# Patient Record
Sex: FEMALE | Race: WHITE | NOT HISPANIC OR LATINO | Employment: FULL TIME | URBAN - METROPOLITAN AREA
[De-identification: names, ages, dates, MRNs, and addresses within clinical notes are randomized per-mention and may not be internally consistent; named-entity substitution may affect disease eponyms.]

---

## 2017-03-06 ENCOUNTER — ALLSCRIPTS OFFICE VISIT (OUTPATIENT)
Dept: OTHER | Facility: OTHER | Age: 42
End: 2017-03-06

## 2017-03-29 ENCOUNTER — ALLSCRIPTS OFFICE VISIT (OUTPATIENT)
Dept: OTHER | Facility: OTHER | Age: 42
End: 2017-03-29

## 2017-09-05 LAB
LEFT EYE DIABETIC RETINOPATHY: NORMAL
RIGHT EYE DIABETIC RETINOPATHY: NORMAL

## 2017-09-26 ENCOUNTER — ALLSCRIPTS OFFICE VISIT (OUTPATIENT)
Dept: OTHER | Facility: OTHER | Age: 42
End: 2017-09-26

## 2017-09-29 ENCOUNTER — LAB CONVERSION - ENCOUNTER (OUTPATIENT)
Dept: OTHER | Facility: OTHER | Age: 42
End: 2017-09-29

## 2017-09-29 LAB
ADDITIONAL INFORMATION (HISTORICAL): NORMAL
COMMENT (HISTORICAL): NORMAL
DIAGNOSIS (HISTORICAL): NORMAL
PATHOLOGIST (HISTORICAL): NORMAL
PROCEDURE (HISTORICAL): NORMAL
SITE/SOURCE (HISTORICAL): NORMAL

## 2017-10-02 ENCOUNTER — GENERIC CONVERSION - ENCOUNTER (OUTPATIENT)
Dept: OTHER | Facility: OTHER | Age: 42
End: 2017-10-02
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
## 2018-01-09 NOTE — PSYCH
History of Present Illness  [facility] Progress [facility]:   Specialized Services Documentation - Therapist must complete separate progress note for each specific clinical activity in which the client participated during the day  ( ) Other 2751-2133 Clinical review completed today with Elizabeth Gonzalez [place] [facility] and three additional days were authorized PHP with review again on 6/1/16  Case Management Note:   Roger Hartmann met with CM to discuss her absence from Program yesterday  Pt states she did meet with her ex-fiance last evening and they discussed his terminal neurological disorder and his plan for treatment and end of life care  Pt stated that she knows that it is going to be very difficult for him, as well as for her, as he worsens but that she wants to be a support to him because she knows "he is frightened of dying alone " Neena stated that she was not sure where their relationship stood but he introduced her as his "fiance" to his home health RN yesterday and this was encouraging for her as she states she wants the relationship to continue  Pt states that she has rented an apt and will begin moving in this weekend  She stated that her ex- will move into the home and they both have already sat with their children and explained what is happening to them  Pt stated that she also took her children to the new apt to show them where she will live and discussed their reactions  Neena stated that she would like to speak with Dr Viktor Warren, when she returns, regarding medication for anxiety PRN as she feels very anxious  TREATMENT SESSION NUMBER: 4   Current suicide risk is low  Medications not changed/added/denied  Keegan Marcelino RN      Active Problems    1  ADHD, predominantly inattentive type (314 00) (F90 0)   2  Allergic rhinitis (477 9) (J30 9)   3  Anxiety and depression (300 00,311) (F41 9,F32 9)   4  Benign essential hypertension (401 1) (I10)   5   BMI 31 0-31 9,adult (V85 31) (Z68 31)   6  Cervicalgia (723 1) (M54 2)   7  Constipation (564 00) (K59 00)   8  Dysfunction of eustachian tube, unspecified laterality (381 81) (H69 80)   9  Encounter for screening mammogram for malignant neoplasm of breast (V76 12)   (Z12 31)   10  FORTINO (generalized anxiety disorder) (300 02) (F41 1)   11  Genital herpes simplex (054 10) (A60 00)   12  Headache (784 0) (R51)   13  Hip pain, unspecified laterality   14  Hypokalemia (276 8) (E87 6)   15  Lumbago (724 2) (M54 5)   16  Lyme disease (088 81) (A69 20)   17  Motion sickness (994 6) (T75 3XXA)   18  Obesity, Class I, BMI 30-34 9 (278 00) (E66 9)   19  Other muscle spasm (728 85) (M62 838)   20  Severe episode of recurrent major depressive disorder, without psychotic features    (296 33) (F33 2)   21  Tachycardia (785 0) (R00 0)   22  Thyroid disorder (246 9) (E07 9)   23  Well adult on routine health check (V70 0) (Z00 00)    Past Medical History    1  History of Acute bronchitis (466 0) (J20 9)   2  History of Acute pharyngitis (462) (J02 9)   3  History of Acute sinusitis (461 9) (J01 90)   4  History of Acute sinusitis (461 9) (J01 90)   5  History of Acute tonsillitis (463) (J03 90)   6  History of Acute upper respiratory infection (465 9) (J06 9)   7  History of Acute upper respiratory infection (465 9) (J06 9)   8  History of Acute UTI (599 0) (N39 0)   9  History of Cervicalgia (723 1) (M54 2)   10  History of acute pharyngitis (V12 69) (Z87 09)   11  History of migraine (V12 49) (Z86 69)   12  History of Nonallopathic lesion (739 9) (M99 9)   13  History of Palpitations (785 1) (R00 2)   14  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)   15  History of Shoulder joint pain, unspecified laterality   16  Urinary tract infection (599 0) (N39 0)    Allergies    1  Percocet TABS    Current Meds   1  Albuterol 90 MCG/ACT AERS; INHALE 1 TO 2 PUFFS EVERY 4 TO 6 HOURS AS   NEEDED; Therapy: (Recorded:09Jre5111) to Recorded   2  BuPROPion HCl ER (XL) 300 MG Oral Tablet Extended Release 24 Hour (Wellbutrin XL);   TAKE 1 TABLET Daily thity tablets; Therapy: 13ZPZ7876 to (Evaluate:23Jun2016)  Requested for: 82BPD9862; Last   Rx:57Stn6984 Ordered   3  Griselda 0 35 MG Oral Tablet; TAKE 1 TABLET DAILY; Therapy: 64LDY9662 to Recorded   4  Flonase 50 MCG/ACT Nasal Suspension (Fluticasone Propionate); USE 1 SPRAY IN   EACH NOSTRIL ONCE DAILY; Therapy: (Recorded:74Gha5090) to Recorded   5  HydrOXYzine HCl - 25 MG Oral Tablet; TAKE 1 TO 2 TABLETS AT BEDTIME; Last   Rx:17Mqv9660 Ordered   6  Metoprolol Succinate ER 50 MG Oral Tablet Extended Release 24 Hour; TAKE 1 TABLET   DAILY; Therapy: 55IHO1198 to (Evaluate:16Oct2016)  Requested for: 75LYA6733; Last   Rx:80Bwf5217 Ordered   7  Multivitamins TABS; 1 Every Day; Therapy: 08TPQ5106 to  Requested for: 79UWU0133 Recorded   8  Pristiq 100 MG Oral Tablet Extended Release 24 Hour; Take 1 tablet daily; Therapy: 32JNZ7274 to  Requested for: 03TTV6652 Recorded   9  Vyvanse 50 MG Oral Capsule; 1 capsule at 2:00pm;   Therapy: 08YZB8419 to  Requested for: 79VFF6433 Recorded   10  Vyvanse 60 MG Oral Capsule; take 1 capsule daily; Therapy: (Recorded:24Lxi5808) to Recorded   11  Zaleplon 10 MG Oral Capsule; TAKE 1 TO 2 CAPSULES AT BEDTIME AS NEEDED; Last    GF:28JPT8106 Ordered    Family Psych History  Son    1  Family history of attention deficit hyperactivity disorder (ADHD) (V17 0) (Z81 8)  Sister    2  Family history of Bipolar 1 disorder  Family History    3  Family history of Asbestosis   4  Family history of Cancer   5  Family history of Diabetes Mellitus (V18 0)   6  Family history of Emphysema   7  Family history of Glaucoma   8  Family history of Heart Disease (V17 49)   9  Family history of Hypertension (V17 49)   10   Family history of Mixed Hyperlipoproteinemia    Social History    · College graduate   ·    · Employed   · Never A Smoker   · No history of alcohol use    Future Appointments    Date/Time Provider Specialty Site   05/27/2016 10:00 AM GEORGETTE Bustos   Psychiatry [facility]     Signatures   Electronically signed by : Royce Saavedra RN; May 26 2016  4:20PM EST                       (Author)    Electronically signed by : Royce Saavedra RN; May 26 2016  4:23PM EST                       (Author)

## 2018-01-10 NOTE — PSYCH
History of Present Illness  [facility]:   Specialized Services Documentation - Therapist must complete separate progress note for each specific clinical activity in which the client participated during the day  Case Management Note: Individual Case Management visit not provided today  [address] excused due to IOP status  Rosaline Boyd, [facility]      Active Problems    1  ADHD, predominantly inattentive type (314 00) (F90 0)   2  Allergic rhinitis (477 9) (J30 9)   3  Anxiety and depression (300 00,311) (F41 9,F32 9)   4  Benign essential hypertension (401 1) (I10)   5  BMI 31 0-31 9,adult (V85 31) (Z68 31)   6  Cervicalgia (723 1) (M54 2)   7  Constipation (564 00) (K59 00)   8  Dysfunction of eustachian tube, unspecified laterality (381 81) (H69 80)   9  Encounter for screening mammogram for malignant neoplasm of breast (V76 12)   (Z12 31)   10  FORTINO (generalized anxiety disorder) (300 02) (F41 1)   11  Genital herpes simplex (054 10) (A60 00)   12  Headache (784 0) (R51)   13  Hip pain, unspecified laterality   14  Hypokalemia (276 8) (E87 6)   15  Lumbago (724 2) (M54 5)   16  Lyme disease (088 81) (A69 20)   17  Motion sickness (994 6) (T75 3XXA)   18  Obesity, Class I, BMI 30-34 9 (278 00) (E66 9)   19  Other muscle spasm (728 85) (M62 838)   20  Severe episode of recurrent major depressive disorder, without psychotic features    (296 33) (F33 2)   21  Tachycardia (785 0) (R00 0)   22  Thyroid disorder (246 9) (E07 9)   23  Well adult on routine health check (V70 0) (Z00 00)    Past Medical History    1  History of Acute bronchitis (466 0) (J20 9)   2  History of Acute pharyngitis (462) (J02 9)   3  History of Acute sinusitis (461 9) (J01 90)   4  History of Acute sinusitis (461 9) (J01 90)   5  History of Acute tonsillitis (463) (J03 90)   6  History of Acute upper respiratory infection (465 9) (J06 9)   7   History of Acute upper respiratory infection (465 9) (J06 9)   8  History of Acute UTI (599 0) (N39 0)   9  History of Cervicalgia (723 1) (M54 2)   10  Denied: History of Head trauma   11  History of acute pharyngitis (V12 69) (Z87 09)   12  History of migraine (V12 49) (Z86 69)   13  History of Nonallopathic lesion (739 9) (M99 9)   14  History of Palpitations (785 1) (R00 2)   15  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)   16  Denied: History of Seizure   17  History of Shoulder joint pain, unspecified laterality   18  Urinary tract infection (599 0) (N39 0)    Allergies    1  Percocet TABS    Current Meds   1  Albuterol 90 MCG/ACT AERS; INHALE 1 TO 2 PUFFS EVERY 4 TO 6 HOURS AS   NEEDED; Therapy: (Recorded:84Pam8348) to Recorded   2  BuPROPion HCl ER (XL) 300 MG Oral Tablet Extended Release 24 Hour (Wellbutrin XL);   TAKE 1 TABLET Daily thity tablets; Therapy: 21TGQ7107 to (Evaluate:23Jun2016)  Requested for: 01UJR4084; Last   Rx:68Eeg4883 Ordered   3  CloNIDine HCl - 0 1 MG Oral Tablet; Take one tablet PO  AT 6:00 PM and 1 tablet PO AT   9:00 pm;   Therapy: (Recorded:59Qle7486) to Recorded   4  Griselda 0 35 MG Oral Tablet; TAKE 1 TABLET DAILY; Therapy: 43BUR2442 to Recorded   5  Flonase 50 MCG/ACT SUSP (Fluticasone Propionate); USE 1 SPRAY IN EACH NOSTRIL   ONCE DAILY; Therapy: (Recorded:85Mzw9389) to Recorded   6  Gabapentin 300 MG Oral Capsule; TAKE 3 CAPSULE Bedtime; Last Rx:31Aoz7559   Ordered   7  HydrOXYzine HCl - 50 MG Oral Tablet; TAKE 2 TABLET Bedtime; Last Rx:89Rqm7170   Ordered   8  Metoprolol Succinate ER 50 MG Oral Tablet Extended Release 24 Hour; TAKE 1 TABLET   DAILY; Therapy: 74POS7822 to (Evaluate:16Oct2016)  Requested for: 05RKW4563; Last   Rx:70Bra5009 Ordered   9  Multivitamins TABS; 1 Every Day; Therapy: 22UMP1801 to  Requested for: 17KJC7610 Recorded   10  Pristiq 100 MG Oral Tablet Extended Release 24 Hour; Take 1 tablet daily; Therapy: 02DIM4446 to  Requested for: 66PPA6234 Recorded   11   Vyvanse 50 MG Oral Capsule; 1 capsule at 2:00pm;    Therapy: 42TOD0962 to  Requested for: 34LEX6995 Recorded   12  Vyvanse 60 MG Oral Capsule; take 1 capsule daily; Therapy: (Recorded:54Nde2337) to Recorded   13  Zaleplon 10 MG Oral Capsule; TAKE 1 TO 2 CAPSULES AT BEDTIME AS NEEDED; Last    Rx:96Szk6983 Ordered    Family Psych History  Son    1  Family history of attention deficit hyperactivity disorder (ADHD) (V17 0) (Z81 8)  Sister    2  Family history of Bipolar 1 disorder  Family History    3  Family history of Asbestosis   4  Family history of Cancer   5  Family history of Diabetes Mellitus (V18 0)   6  Family history of Emphysema   7  Family history of Glaucoma   8  Family history of Heart Disease (V17 49)   9  Family history of Hypertension (V17 49)   10   Family history of Mixed Hyperlipoproteinemia    Social History    · College graduate   ·    · Employed   · Never A Smoker   · No history of alcohol use    Future Appointments    Date/Time Provider Specialty Site   09/23/2016 10:15 AM Jude Harding DO Psychiatry [facility]     Signatures   Electronically signed by : FAYE Pham; Sep 22 2016  7:48AM EST                       (Author)

## 2018-01-10 NOTE — PSYCH
History of Present Illness  [facility] Clinical Progress Note [facility]:   Specialized Services Documentation - Therapist must complete separate progress note for each specific clinical activity in which the client participated during the day  Case Management Note:   1300 Carly [facility] did not call and did not show for [facility] today  D/C was planned for today  Patient was called by this CM and message was left on her cell phone requesting return call  1200 Patient was called again and Voice mail message left again by CM requesting return call  1535 CM called patient again and she did  phone and stated she had just exited the shower and was "trying to get going " Patient apologized for not calling out absent today stating she had a "bad night last evening " Neena denied suicidal or homicidal ideation, plan or intent and stated that she was able to "download guided meditation last evening" and this helped her to sleep, and now she is trying to utilize some of her positive coping strategies such as she is going to go out for a walk, in a little while, to the post office and back  Patient states plans for the weekend are to attend her daughter, [employer] [employer] [facility] dance recital both Sat  and Sun  and perhaps see her fianceJuan M  CM reviewed with patient crisis information that she states she has and patient states she will contact crisis if needed and contracted for safety  Jenny Cordoval will come to [facility] on Monday to complete D/C  Current suicide risk is low  Medications not changed/added/denied  Roland Waters, RN   [facility] Follow-up Physician's Orders:   6/24/2016  8:52 AM Discharge Today   MD Roland Burns, RN      Active Problems    1  ADHD, predominantly inattentive type (314 00) (F90 0)   2  Allergic rhinitis (477 9) (J30 9)   3  Anxiety and depression (300 00,311) (F41 9,F32 9)   4  Benign essential hypertension (401 1) (I10)   5  BMI 31 0-31 9,adult (V85 31) (Z68 31)   6  Cervicalgia (723 1) (M54 2)   7  Constipation (564 00) (K59 00)   8  Dysfunction of eustachian tube, unspecified laterality (381 81) (H69 80)   9  Encounter for screening mammogram for malignant neoplasm of breast (V76 12)   (Z12 31)   10  FORTINO (generalized anxiety disorder) (300 02) (F41 1)   11  Genital herpes simplex (054 10) (A60 00)   12  Headache (784 0) (R51)   13  Hip pain, unspecified laterality   14  Hypokalemia (276 8) (E87 6)   15  Lumbago (724 2) (M54 5)   16  Lyme disease (088 81) (A69 20)   17  Motion sickness (994 6) (T75 3XXA)   18  Obesity, Class I, BMI 30-34 9 (278 00) (E66 9)   19  Other muscle spasm (728 85) (M62 838)   20  Severe episode of recurrent major depressive disorder, without psychotic features    (296 33) (F33 2)   21  Tachycardia (785 0) (R00 0)   22  Thyroid disorder (246 9) (E07 9)   23  Well adult on routine health check (V70 0) (Z00 00)    Past Medical History    1  History of Acute bronchitis (466 0) (J20 9)   2  History of Acute pharyngitis (462) (J02 9)   3  History of Acute sinusitis (461 9) (J01 90)   4  History of Acute sinusitis (461 9) (J01 90)   5  History of Acute tonsillitis (463) (J03 90)   6  History of Acute upper respiratory infection (465 9) (J06 9)   7  History of Acute upper respiratory infection (465 9) (J06 9)   8  History of Acute UTI (599 0) (N39 0)   9  History of Cervicalgia (723 1) (M54 2)   10  History of acute pharyngitis (V12 69) (Z87 09)   11  History of migraine (V12 49) (Z86 69)   12  History of Nonallopathic lesion (739 9) (M99 9)   13  History of Palpitations (785 1) (R00 2)   14  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)   15  History of Shoulder joint pain, unspecified laterality   16  Urinary tract infection (599 0) (N39 0)    Allergies    1  Percocet TABS    Current Meds   1  Albuterol 90 MCG/ACT AERS; INHALE 1 TO 2 PUFFS EVERY 4 TO 6 HOURS AS   NEEDED; Therapy: (Recorded:58Dwl4947) to Recorded   2   BuPROPion HCl ER (XL) 300 MG Oral Tablet Extended Release 24 Hour (Wellbutrin XL);   TAKE 1 TABLET Daily thity tablets; Therapy: 61OTJ1758 to (Evaluate:23Jun2016)  Requested for: 34VQM3378; Last   Rx:77Vgh4100 Ordered   3  ClonazePAM 1 MG Oral Tablet; TAKE 1 TABLET AT BEDTIME; Therapy: 47QEV2463 to (Evaluate:38Jps6557); Last Rx:10Czl8234 Ordered   4  Griselda 0 35 MG Oral Tablet; TAKE 1 TABLET DAILY; Therapy: 02JRE3740 to Recorded   5  Flonase 50 MCG/ACT SUSP (Fluticasone Propionate); USE 1 SPRAY IN EACH NOSTRIL   ONCE DAILY; Therapy: (Recorded:91Tlw7455) to Recorded   6  HydrOXYzine HCl - 25 MG Oral Tablet; TAKE 1 TO 2 TABLETS AT BEDTIME; Last   Rx:17Qsz3180 Ordered   7  Metoprolol Succinate ER 50 MG Oral Tablet Extended Release 24 Hour; TAKE 1 TABLET   DAILY; Therapy: 76EWT5653 to (Evaluate:16Oct2016)  Requested for: 27XXB6170; Last   Rx:66Qrp9587 Ordered   8  Multivitamins TABS; 1 Every Day; Therapy: 36KWN4733 to  Requested for: 09BRB1160 Recorded   9  Pristiq 100 MG Oral Tablet Extended Release 24 Hour; Take 1 tablet daily; Therapy: 68VLP8470 to  Requested for: 02PFS1060 Recorded   10  Vyvanse 50 MG Oral Capsule; 1 capsule at 2:00pm;    Therapy: 21SUN8790 to  Requested for: 76EOO5315 Recorded   11  Vyvanse 60 MG Oral Capsule; take 1 capsule daily; Therapy: (Recorded:64Ric3810) to Recorded   12  Zaleplon 10 MG Oral Capsule; TAKE 1 TO 2 CAPSULES AT BEDTIME AS NEEDED; Last    Rx:49Ped3924 Ordered    Family Psych History  Son    1  Family history of attention deficit hyperactivity disorder (ADHD) (V17 0) (Z81 8)  Sister    2  Family history of Bipolar 1 disorder  Family History    3  Family history of Asbestosis   4  Family history of Cancer   5  Family history of Diabetes Mellitus (V18 0)   6  Family history of Emphysema   7  Family history of Glaucoma   8  Family history of Heart Disease (V17 49)   9  Family history of Hypertension (V17 49)   10   Family history of Mixed Hyperlipoproteinemia    Social History    · College graduate   ·    · Employed   · Never A Smoker   · No history of alcohol use    Future Appointments    Date/Time Provider Specialty Site   06/24/2016 10:15 AM GEORGETTE Parker   Psychiatry [facility]   07/14/2016 11:00 AM Paulette Hankins [place]     Signatures   Electronically signed by : Domo Herman RN; Jun 24 2016  8:08AM EST                       (Author)    Electronically signed by : GEORGETTE Sal ; Jun 24 2016  8:51AM EST                       (Author)    Electronically signed by : Domo Herman RN; Jun 24 2016  9:56AM EST                       (Author)    Electronically signed by : Domo Herman RN; Jun 24 2016 11:01AM EST                       (Author)    Electronically signed by : Domo Herman RN; Jun 24 2016  3:46PM EST                       (Author)

## 2018-01-10 NOTE — PSYCH
History of Present Illness  [facility] Clinical Progress [facility]:   Specialized Services Documentation - Therapist must complete separate progress note for each specific clinical activity in which the client participated during the day  (810) Group Psychotherapy: (9:30-10:30) Neena participated in psychotherapy group focused on triggers and processing emotions  Neena identified work as a stressor  Miky Huynh was actively engaged in conversation about the importance of exploring difficult feelings and how avoiding them impacts overall health and well being  She shared with peers ways that she has begun to work on facing her emotions and shared some resources that she has found helpful and inspirational  Some moderate progress toward goals  Continue psychotherapy group to encourage Neena to explore stressors and coping  Treatment Plan Problem(s): 1 1, 1 2  Becki Stephenson [facility]     (324) Group Psychotherapy: ([id number])  Miky Huynh was an active participant in the group on gratitude  She shared personal accounts with her peers, and offered sound advice when warranted  Neena made moderate progress towards her goal    Nay Swain  [facility]  Treatment Plan Problem(s): 1 3        (592) Education Therapy Goals set - continue to work on [address] Problem(s): 1 2  Education Therapy Time - 0900 - 0930 Previous goal was met  Readiness to Learning:  She is receptive to learning  There are  no barriers to learning  Learning Assessment Time - 1330 - 1400   Education completed on  illness and wellness tools  The teaching method was  verbal  Shared area of learning: Yes  FAYE Krueger     (952) Allied Therapy 3524-3219 Neena actively shared in [facility] group focused on perception  Group explored development of thoughts based on perception and ways to make choices related to view we take of situations and self  She identified influences to her perception and a new understanding of changing her thoughts  She was open and able to apply concepts to herself, however she appeared distracted and off task at times (focusing on small elements of task versus bigger picture)  Group discussed strategies to explore ways to reframe perception to helpful versus hurtful using reality testing strategies  Some inconsistent effort toward goal noted  Continue AT to explore wellness strategies and personal role in reframing thoughts  Treatment Plan Problem(s): 1 5, 1 6  ROGERS DennisBC       Case Management Note:   1999-1250 Met with [address]  She identified spending time with s/o over weekend  She feels her relationship is not an issue, but still struggles with moving it "too fast" and her emotional connection  Encouraged her to focus on treatment goals this week from 1 5 and 1 6 - to spend time exploring what would need to happen to return to work and be successful and to continue to maintain self care - basic ADLs to contribute to building self-confidence  She will be excused tomorrow due to IOP status  TREATMENT SESSION NUMBER: 11   Current suicide risk is low  Medications not changed/added/denied  ROGERS DennisBC      Active Problems    1  ADHD, predominantly inattentive type (314 00) (F90 0)   2  Allergic rhinitis (477 9) (J30 9)   3  Anxiety and depression (300 00,311) (F41 9,F32 9)   4  Benign essential hypertension (401 1) (I10)   5  BMI 31 0-31 9,adult (V85 31) (Z68 31)   6  Cervicalgia (723 1) (M54 2)   7  Constipation (564 00) (K59 00)   8  Dysfunction of eustachian tube, unspecified laterality (381 81) (H69 80)   9  Encounter for screening mammogram for malignant neoplasm of breast (V76 12)   (Z12 31)   10  FORTINO (generalized anxiety disorder) (300 02) (F41 1)   11  Genital herpes simplex (054 10) (A60 00)   12  Headache (784 0) (R51)   13  Hip pain, unspecified laterality   14  Hypokalemia (276 8) (E87 6)   15  Lumbago (724 2) (M54 5)   16  Lyme disease (088 81) (A69 20)   17   Motion sickness (994 6) (T75 3XXA)   18  Obesity, Class I, BMI 30-34 9 (278 00) (E66 9)   19  Other muscle spasm (728 85) (M62 838)   20  Severe episode of recurrent major depressive disorder, without psychotic features    (296 33) (F33 2)   21  Tachycardia (785 0) (R00 0)   22  Thyroid disorder (246 9) (E07 9)   23  Well adult on routine health check (V70 0) (Z00 00)    Past Medical History    1  History of Acute bronchitis (466 0) (J20 9)   2  History of Acute pharyngitis (462) (J02 9)   3  History of Acute sinusitis (461 9) (J01 90)   4  History of Acute sinusitis (461 9) (J01 90)   5  History of Acute tonsillitis (463) (J03 90)   6  History of Acute upper respiratory infection (465 9) (J06 9)   7  History of Acute upper respiratory infection (465 9) (J06 9)   8  History of Acute UTI (599 0) (N39 0)   9  History of Cervicalgia (723 1) (M54 2)   10  Denied: History of Head trauma   11  History of acute pharyngitis (V12 69) (Z87 09)   12  History of migraine (V12 49) (Z86 69)   13  History of Nonallopathic lesion (739 9) (M99 9)   14  History of Palpitations (785 1) (R00 2)   15  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)   16  Denied: History of Seizure   17  History of Shoulder joint pain, unspecified laterality   18  Urinary tract infection (599 0) (N39 0)    Allergies    1  Percocet TABS    Current Meds   1  Albuterol 90 MCG/ACT AERS; INHALE 1 TO 2 PUFFS EVERY 4 TO 6 HOURS AS   NEEDED; Therapy: (Recorded:40Zbi7930) to Recorded   2  BuPROPion HCl ER (XL) 300 MG Oral Tablet Extended Release 24 Hour; TAKE 1   TABLET Daily thity tablets; Therapy: 26ZMG6034 to (Evaluate:23Jun2016)  Requested for: 35WFQ7515; Last   Rx:06Dkt8352 Ordered   3  CloNIDine HCl - 0 1 MG Oral Tablet; Take one tablet PO  AT 6:00 PM and 1 tablet PO AT   9:00 pm;   Therapy: (Recorded:26Lim4231) to Recorded   4  Griselda 0 35 MG Oral Tablet; TAKE 1 TABLET DAILY; Therapy: 10DKR3754 to Recorded   5   Flonase 50 MCG/ACT SUSP; USE 1 SPRAY IN EACH NOSTRIL ONCE DAILY; Therapy: (Recorded:80Pxn5855) to Recorded   6  Gabapentin 300 MG Oral Capsule; TAKE 3 CAPSULE Bedtime; Last Rx:19Qpf3224   Ordered   7  HydrOXYzine HCl - 50 MG Oral Tablet; TAKE 2 TABLET Bedtime; Last Rx:64Edl6020   Ordered   8  Metoprolol Succinate ER 50 MG Oral Tablet Extended Release 24 Hour; TAKE 1 TABLET   DAILY; Therapy: 01VLK9143 to (Evaluate:91Voa0366)  Requested for: 83KCG9955; Last   Rx:67Aew6957 Ordered   9  Multivitamins TABS; 1 Every Day; Therapy: 66SKA3092 to  Requested for: 26MKW4114 Recorded   10  Pristiq 100 MG Oral Tablet Extended Release 24 Hour; Take 1 tablet daily; Therapy: 70HGY9618 to  Requested for: 08IVT0198 Recorded   11  Vyvanse 50 MG Oral Capsule; 1 capsule at 2:00pm;    Therapy: 95LYW8040 to  Requested for: 66MMK0267 Recorded   12  Vyvanse 60 MG Oral Capsule; take 1 capsule daily; Therapy: (Recorded:46Bop8976) to Recorded   13  Zaleplon 10 MG Oral Capsule; TAKE 1 TO 2 CAPSULES AT BEDTIME AS NEEDED; Last    A93TCM7403 Ordered    Family Psych History  Son    1  Family history of attention deficit hyperactivity disorder (ADHD) (V17 0) (Z81 8)  Sister    2  Family history of Bipolar 1 disorder  Family History    3  Family history of Asbestosis   4  Family history of Cancer   5  Family history of Diabetes Mellitus (V18 0)   6  Family history of Emphysema   7  Family history of Glaucoma   8  Family history of Heart Disease (V17 49)   9  Family history of Hypertension (V17 49)   10  Family history of Mixed Hyperlipoproteinemia    Social History    · College graduate   ·    · Employed   · Never A Smoker   · No history of alcohol use    Future Appointments    Date/Time Provider Specialty Site   2016 10:15 AM GEORGETTE Curtis   Psychiatry [facility]   2016 10:15 AM Scott Caldera DO Psychiatry [facility]   2016 11:00 AM Paulette Hankins, [address]     Signatures   Electronically signed by : HARSHAL SteenLSW; Sep 19 2016 12:06PM EST                       (Author)    Electronically signed by : BYRON HollidayW; Sep 19 2016 12:36PM EST                       (Author)    Electronically signed by : FAYE Núñez; Sep 19 2016  2:36PM EST                       (Author)

## 2018-01-10 NOTE — PSYCH
History of Present Illness  [facility] Clinical Progress Note [facility]:   Specialized Services Documentation - Therapist must complete separate progress note for each specific clinical activity in which the client participated during the day  [phone]) Group Psychotherapy: 9:30-10:30-Neena participated in psychotherapy group dealing with themes of healthy relationships and dealing with stigma and guilt of mental illness  Neena identified her boyfriend and their relationship as a stressor  She was engaged in group discussion about impulsive behaviors and offered feedback to her peers  Minimal progress noted toward goals  Continue group psychotherapy to allow patient to explore stressors and healthier ways of coping  Treatment Plan Problem(s): 1 1, 1 2  Yumiko Boothe MSW, LSW     (108) Group Psychotherapy: 4084-3101 [facility] participated in wellness group focused on assessment of weekly goal work in each area of functioning; including physical, emotional, cognitive, social and spiritual  Pt shared that she will continue to work on emotional area as she is feeling she needs to work on coping strategies to handle her loneliness more appropriately now that she is living alone  Pt shared she became emotionally upset when she spent time with her fiance on Fathers Day and his oldest daughter asked that she leave so patient went out and bought a new TV on the way home "thanks to her " Pt made mild progress was made toward goal  Continue group to educate and encourage patient to identify areas of functioning requiring ongoing attention for healthier functioning  Treatment Plan Problem(s): 1 1, 1 2, 1 4  Floyd Oropeza RN       (388) Education Therapy Goals set - review schedule to keep self organized    Treatment Plan Problem(s): 1 5  Education Therapy Time - 0900 - 0930 Previous goal was met  Readiness to Learning:  She is receptive to learning  There are  no barriers to learning    Learning Assessment Time - 5419 - 3332 Education completed on  illness and wellness tools  The teaching method was  verbal  Shared area of learning: Yes  FAYE Lyons     (985) Allied Therapy 2377-4443 Neena actively shared in [facility] group focused on challenging anxiety and fear  Patient participated in discussion related to song exploring positive risk  She identified fear of the unknown being a struggle for her  She engaged in several tasks to explore distraction as well as role of active steps toward change  Patient positively contributed to the discussion, sharing about self and actively attempting to engage with peers  Positive efforts toward tx goals  Continue AT to explore active role in skill development and use to reduce symptoms  Treatment Plan Problem(s): 1 1  FAYE Lyons       Case Management Note:   14//IOP3 6756-2203 Neena met with CM to discuss that she had difficulty this weekend emotionally, as neither her ex- nor her fiance, were supportive toward her when she visited with each of them  She stated that she has begun to realize that she does need to add structure to her days and has begun working on a routine or schedule for when she is discharged from [facility]  Patient denied suicidal or homicidal ideation, plan or intent  Patient has been working on [employer] and will bring to Program on Wednesday to review with CM as well as provisional schedule  Patient's FMLA forms have been completed, signed by [facility] psychiatrist and given to patient to deliver, per pt request    TREATMENT SESSION NUMBER: 14   Current suicide risk is low  Medications not changed/added/denied  Tien Huitron, RN      Active Problems    1  ADHD, predominantly inattentive type (314 00) (F90 0)   2  Allergic rhinitis (477 9) (J30 9)   3  Anxiety and depression (300 00,311) (F41 9,F32 9)   4  Benign essential hypertension (401 1) (I10)   5  BMI 31 0-31 9,adult (V85 31) (Z68 31)   6  Cervicalgia (723 1) (M54 2)   7   Constipation (564 00) (K59 00)   8  Dysfunction of eustachian tube, unspecified laterality (381 81) (H69 80)   9  Encounter for screening mammogram for malignant neoplasm of breast (V76 12)   (Z12 31)   10  FORTINO (generalized anxiety disorder) (300 02) (F41 1)   11  Genital herpes simplex (054 10) (A60 00)   12  Headache (784 0) (R51)   13  Hip pain, unspecified laterality   14  Hypokalemia (276 8) (E87 6)   15  Lumbago (724 2) (M54 5)   16  Lyme disease (088 81) (A69 20)   17  Motion sickness (994 6) (T75 3XXA)   18  Obesity, Class I, BMI 30-34 9 (278 00) (E66 9)   19  Other muscle spasm (728 85) (M62 838)   20  Severe episode of recurrent major depressive disorder, without psychotic features    (296 33) (F33 2)   21  Tachycardia (785 0) (R00 0)   22  Thyroid disorder (246 9) (E07 9)   23  Well adult on routine health check (V70 0) (Z00 00)    Past Medical History    1  History of Acute bronchitis (466 0) (J20 9)   2  History of Acute pharyngitis (462) (J02 9)   3  History of Acute sinusitis (461 9) (J01 90)   4  History of Acute sinusitis (461 9) (J01 90)   5  History of Acute tonsillitis (463) (J03 90)   6  History of Acute upper respiratory infection (465 9) (J06 9)   7  History of Acute upper respiratory infection (465 9) (J06 9)   8  History of Acute UTI (599 0) (N39 0)   9  History of Cervicalgia (723 1) (M54 2)   10  History of acute pharyngitis (V12 69) (Z87 09)   11  History of migraine (V12 49) (Z86 69)   12  History of Nonallopathic lesion (739 9) (M99 9)   13  History of Palpitations (785 1) (R00 2)   14  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)   15  History of Shoulder joint pain, unspecified laterality   16  Urinary tract infection (599 0) (N39 0)    Allergies    1  Percocet TABS    Current Meds   1  Albuterol 90 MCG/ACT AERS; INHALE 1 TO 2 PUFFS EVERY 4 TO 6 HOURS AS   NEEDED; Therapy: (Recorded:79Jjt3916) to Recorded   2   BuPROPion HCl ER (XL) 300 MG Oral Tablet Extended Release 24 Hour; TAKE 1   TABLET Daily dhaval tablets; Therapy: 10QTZ7951 to (Evaluate:23Jun2016)  Requested for: 96IIA8233; Last   Rx:24May2016 Ordered   3  ClonazePAM 1 MG Oral Tablet; TAKE 1 TABLET AT BEDTIME; Therapy: 12HWE6329 to (Evaluate:21Vzh9693); Last Rx:10Jun2016 Ordered   4  Griselda 0 35 MG Oral Tablet; TAKE 1 TABLET DAILY; Therapy: 47LIL3008 to Recorded   5  Flonase 50 MCG/ACT SUSP; USE 1 SPRAY IN EACH NOSTRIL ONCE DAILY; Therapy: (Recorded:20May2016) to Recorded   6  HydrOXYzine HCl - 25 MG Oral Tablet; TAKE 1 TO 2 TABLETS AT BEDTIME; Last   Rx:24May2016 Ordered   7  Metoprolol Succinate ER 50 MG Oral Tablet Extended Release 24 Hour; TAKE 1 TABLET   DAILY; Therapy: 83MCU6961 to (Evaluate:16Oct2016)  Requested for: 19FVS9086; Last   Rx:19Apr2016 Ordered   8  Multivitamins TABS; 1 Every Day; Therapy: 09JXX8662 to  Requested for: 17RIK9661 Recorded   9  Pristiq 100 MG Oral Tablet Extended Release 24 Hour; Take 1 tablet daily; Therapy: 35CHN1305 to  Requested for: 31IDE9613 Recorded   10  Vyvanse 50 MG Oral Capsule; 1 capsule at 2:00pm;    Therapy: 12PTZ6013 to  Requested for: 96UCW5176 Recorded   11  Vyvanse 60 MG Oral Capsule; take 1 capsule daily; Therapy: (Recorded:24May2016) to Recorded   12  Zaleplon 10 MG Oral Capsule; TAKE 1 TO 2 CAPSULES AT BEDTIME AS NEEDED; Last    Rx:24May2016 Ordered    Family Psych History  Son    1  Family history of attention deficit hyperactivity disorder (ADHD) (V17 0) (Z81 8)  Sister    2  Family history of Bipolar 1 disorder  Family History    3  Family history of Asbestosis   4  Family history of Cancer   5  Family history of Diabetes Mellitus (V18 0)   6  Family history of Emphysema   7  Family history of Glaucoma   8  Family history of Heart Disease (V17 49)   9  Family history of Hypertension (V17 49)   10   Family history of Mixed Hyperlipoproteinemia    Social History    · College graduate   ·    · Employed   · Never A Smoker   · No history of alcohol use    Future Appointments    Date/Time Provider Specialty Site   06/22/2016 10:15 AM GEORGETTE Merrill  Psychiatry [facility]   06/24/2016 10:15 AM GEORGETTE Merrill   Psychiatry [facility]   07/14/2016 11:00 AM [facility]     Signatures   Electronically signed by : Madonna Nageotte, MSWLSW; Jun 20 2016 11:28AM EST                       (Author)    Electronically signed by : Jose Lawton RN; Jun 20 2016  1:57PM EST                       (Author)    Electronically signed by : FAYE Barger; Jun 20 2016  2:40PM EST                       (Author)    Electronically signed by : Jose Lawton RN; Jun 22 2016  3:37PM EST                       (Author)

## 2018-01-11 NOTE — PSYCH
History of Present Illness  [facility]:   Specialized Services Documentation - Therapist must complete separate progress note for each specific clinical activity in which the client participated during the day  [phone]) Group Psychotherapy: (9:30-10:30) Neena attended psychotherapy group dealing with themes of daily structure outside of program and managing stress  Neena identified moving out of her house and not getting support from her ex- as a stressor  Neena also discussed going shopping over the weekend and that she was able to resist buying an expensive recliner for her apartment, which she felt good about  She was actively engaged in the discussion and frequently offered peers constructive feedback  Good progress noted toward goals  Continue group psychotherapy to allow patient to explore stressors and healthy ways of coping  Treatment Plan Problem(s): 1 1, 1 2  Garima Stubbs MSW, LSW     (029) Group Psychotherapy: [phone] Aislinn Bell participated in wellness group focused on educating patient on benefits of beginning to exercise to improve mood and physical well-being with walking on a regular basis  Pt shared that she has many reasons why she avoids exercising regularly but has recently realized how "out of shape I am when moving I really felt it " Pt states she wants to exercise to be in better shape for herself and for her children so she can do things with them interactively rather than sit on the sidelines  Pt made mild progress toward goal  Continue group to educate patient on benefits of getting started in a walking program and assessing common road blocks that prevent people from maintaining regular exercise  Treatment Plan Problem(s): 1 1,1 2  Luis Carlos Jaime RN       (319) Education Therapy Goals set - to begin to organize/unpack boxes    Treatment Plan Problem(s): 1 5  Education Therapy Time - 0900 - 0930 Previous goal was met  Readiness to Learning:   She is receptive to learning  There are  no barriers to learning  Learning Assessment Time - 1330 - 1400   Education completed on  illness and wellness tools  The teaching method was  verbal  Shared area of learning: Yes  FAYE Lyons     (674) Allied Therapy [phone] Neena actively shared in [facility] group focused on barriers to communication  Engaged in tasks exploring what makes it difficult to share and strategies to improve with supports  She shared a need to work on clarifying her position before she talks and difficulty trusting others  Group reinforced personal role in sharing needs and âIâ statements practiced  Some exploration of goal noted  Continue AT to encourage development and use of healthy wellness tools  Treatment Plan Problem(s): 1 5  FAYE Lyons       Case Management Note:   13/IOP3 [phone] Neena met with CM to discuss progress in Program  She stated that she reviewed treatment plan with CM reserve on Friday and, over the weekend she has written down some additional goals she would like to work on going forward  Pt states she is working on her WRAP and is up to [facility]  Pt requested assistance with WRAP completion on Wednesday when she is again at Program  Pt denied any suicidal or homicidal thoughts, plan or intent  TREATMENT SESSION NUMBER: 13   Current suicide risk is low  Tien Huitron RN      Active Problems    1  ADHD, predominantly inattentive type (314 00) (F90 0)   2  Allergic rhinitis (477 9) (J30 9)   3  Anxiety and depression (300 00,311) (F41 9,F32 9)   4  Benign essential hypertension (401 1) (I10)   5  BMI 31 0-31 9,adult (V85 31) (Z68 31)   6  Cervicalgia (723 1) (M54 2)   7  Constipation (564 00) (K59 00)   8  Dysfunction of eustachian tube, unspecified laterality (381 81) (H69 80)   9  Encounter for screening mammogram for malignant neoplasm of breast (V76 12)   (Z12 31)   10  FORTINO (generalized anxiety disorder) (300 02) (F41 1)   11   Genital herpes simplex (054 10) (A60 00)   12  Headache (784 0) (R51)   13  Hip pain, unspecified laterality   14  Hypokalemia (276 8) (E87 6)   15  Lumbago (724 2) (M54 5)   16  Lyme disease (088 81) (A69 20)   17  Motion sickness (994 6) (T75 3XXA)   18  Obesity, Class I, BMI 30-34 9 (278 00) (E66 9)   19  Other muscle spasm (728 85) (M62 838)   20  Severe episode of recurrent major depressive disorder, without psychotic features    (296 33) (F33 2)   21  Tachycardia (785 0) (R00 0)   22  Thyroid disorder (246 9) (E07 9)   23  Well adult on routine health check (V70 0) (Z00 00)    Past Medical History    1  History of Acute bronchitis (466 0) (J20 9)   2  History of Acute pharyngitis (462) (J02 9)   3  History of Acute sinusitis (461 9) (J01 90)   4  History of Acute sinusitis (461 9) (J01 90)   5  History of Acute tonsillitis (463) (J03 90)   6  History of Acute upper respiratory infection (465 9) (J06 9)   7  History of Acute upper respiratory infection (465 9) (J06 9)   8  History of Acute UTI (599 0) (N39 0)   9  History of Cervicalgia (723 1) (M54 2)   10  History of acute pharyngitis (V12 69) (Z87 09)   11  History of migraine (V12 49) (Z86 69)   12  History of Nonallopathic lesion (739 9) (M99 9)   13  History of Palpitations (785 1) (R00 2)   14  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)   15  History of Shoulder joint pain, unspecified laterality   16  Urinary tract infection (599 0) (N39 0)    Allergies    1  Percocet TABS    Current Meds   1  Albuterol 90 MCG/ACT AERS; INHALE 1 TO 2 PUFFS EVERY 4 TO 6 HOURS AS   NEEDED; Therapy: (Recorded:61Qwa3809) to Recorded   2  BuPROPion HCl ER (XL) 300 MG Oral Tablet Extended Release 24 Hour; TAKE 1   TABLET Daily thity tablets; Therapy: 75CYV9518 to (Evaluate:23Jun2016)  Requested for: 71CFG3588; Last   Rx:24May2016 Ordered   3  ClonazePAM 1 MG Oral Tablet; TAKE 1 TABLET AT BEDTIME; Therapy: 61DVI1575 to (Evaluate:10Jul2016); Last Rx:10Jun2016 Ordered   4   Griselda 0 35 MG Oral Tablet; TAKE 1 TABLET DAILY; Therapy: 02DSB4644 to Recorded   5  Flonase 50 MCG/ACT SUSP; USE 1 SPRAY IN EACH NOSTRIL ONCE DAILY; Therapy: (Recorded:19Ecc6698) to Recorded   6  HydrOXYzine HCl - 25 MG Oral Tablet; TAKE 1 TO 2 TABLETS AT BEDTIME; Last   Rx:86Yfa4997 Ordered   7  Metoprolol Succinate ER 50 MG Oral Tablet Extended Release 24 Hour; TAKE 1 TABLET   DAILY; Therapy: 24YJW1187 to (Evaluate:16Oct2016)  Requested for: 85SZS7285; Last   Rx:52Udq7245 Ordered   8  Multivitamins TABS; 1 Every Day; Therapy: 63CQQ6147 to  Requested for: 56QVP2827 Recorded   9  Pristiq 100 MG Oral Tablet Extended Release 24 Hour; Take 1 tablet daily; Therapy: 66SCR0630 to  Requested for: 17SQU9219 Recorded   10  Vyvanse 50 MG Oral Capsule; 1 capsule at 2:00pm;    Therapy: 88NEB7855 to  Requested for: 11LPV5158 Recorded   11  Vyvanse 60 MG Oral Capsule; take 1 capsule daily; Therapy: (Recorded:44Cmh1076) to Recorded   12  Zaleplon 10 MG Oral Capsule; TAKE 1 TO 2 CAPSULES AT BEDTIME AS NEEDED; Last    Rx:97Avf6808 Ordered    Family Psych History  Son    1  Family history of attention deficit hyperactivity disorder (ADHD) (V17 0) (Z81 8)  Sister    2  Family history of Bipolar 1 disorder  Family History    3  Family history of Asbestosis   4  Family history of Cancer   5  Family history of Diabetes Mellitus (V18 0)   6  Family history of Emphysema   7  Family history of Glaucoma   8  Family history of Heart Disease (V17 49)   9  Family history of Hypertension (V17 49)   10  Family history of Mixed Hyperlipoproteinemia    Social History    · College graduate   ·    · Employed   · Never A Smoker   · No history of alcohol use    Future Appointments    Date/Time Provider Specialty Site   06/15/2016 10:15 AM GEORGETTE York  Psychiatry [facility] PARTIAL HOSPITALIZATION   06/17/2016 10:30 AM GEORGETTE York   Psychiatry [facility]   07/14/2016 11:00 AM Etienne Davila Duran Deepthi     Signatures   Electronically signed by : ALBERTO ShiW; Jun 13 2016 12:46PM EST                       (Author)    Electronically signed by : FAYE Niño; Jun 13 2016  2:21PM EST                       (Author)    Electronically signed by : Domo Herman RN; Jun 13 2016  3:22PM EST                       (Author)    Electronically signed by : Domo Herman RN; Jun 13 2016  3:28PM EST                       (Author)

## 2018-01-11 NOTE — PSYCH
History of Present Illness  [facility]:   Specialized Services Documentation - Therapist must complete separate progress note for each specific clinical activity in which the client participated during the day  Case Management Note:   0800 Patient no called and no showed  CM contacted patient and discussed need to call out absent from [facility] if she cannot attend Program  Miky Huynh stated that she had a "bad night last night" relating that her ex-fiance texted her that he had been to the MD and was told that "there is nothing more we can do for you and get your affairs in order " Pt related that her fiance has an illness that he has now been informed is terminal but he did not want to speak with her last night about it and only came to speak with her while this CM was talking with Miky Huynh  Pt denies SI, HI and stated that she "needs to find out what is going on from him " Pt was reminded that she should attend Program daily but especially when events such as this are happening to take care of herself  Neena stated that she will call 911 or have a support bring her to the emergency room should she feel like hurting herself but denied that she is feeling suicidal at this time  Pt stated that she did become angry and frustrated last evening after ex-fithaddeus would not speak with her and threw her cell phone against the wall and broke it then took took Klonipin to sleep so she "wouldn't think about this any more " Pt apologized for not calling out of Program    TREATMENT SESSION NUMBER: 4   Current suicide risk is low  Medications not changed/added/denied  Asmita Christiansen RN      Active Problems    1  ADHD, predominantly inattentive type (314 00) (F90 0)   2  Allergic rhinitis (477 9) (J30 9)   3  Anxiety and depression (300 00,311) (F41 9,F32 9)   4  Benign essential hypertension (401 1) (I10)   5  BMI 31 0-31 9,adult (V85 31) (Z68 31)   6  Cervicalgia (723 1) (M54 2)   7  Constipation (564 00) (K59 00)   8  Dysfunction of eustachian tube, unspecified laterality (381 81) (H69 80)   9  Encounter for screening mammogram for malignant neoplasm of breast (V76 12)   (Z12 31)   10  FORTINO (generalized anxiety disorder) (300 02) (F41 1)   11  Genital herpes simplex (054 10) (A60 00)   12  Headache (784 0) (R51)   13  Hip pain, unspecified laterality   14  Hypokalemia (276 8) (E87 6)   15  Lumbago (724 2) (M54 5)   16  Lyme disease (088 81) (A69 20)   17  Motion sickness (994 6) (T75 3XXA)   18  Obesity, Class I, BMI 30-34 9 (278 00) (E66 9)   19  Other muscle spasm (728 85) (M62 838)   20  Severe episode of recurrent major depressive disorder, without psychotic features    (296 33) (F33 2)   21  Tachycardia (785 0) (R00 0)   22  Thyroid disorder (246 9) (E07 9)   23  Well adult on routine health check (V70 0) (Z00 00)    Past Medical History    1  History of Acute bronchitis (466 0) (J20 9)   2  History of Acute pharyngitis (462) (J02 9)   3  History of Acute sinusitis (461 9) (J01 90)   4  History of Acute sinusitis (461 9) (J01 90)   5  History of Acute tonsillitis (463) (J03 90)   6  History of Acute upper respiratory infection (465 9) (J06 9)   7  History of Acute upper respiratory infection (465 9) (J06 9)   8  History of Acute UTI (599 0) (N39 0)   9  History of Cervicalgia (723 1) (M54 2)   10  History of acute pharyngitis (V12 69) (Z87 09)   11  History of migraine (V12 49) (Z86 69)   12  History of Nonallopathic lesion (739 9) (M99 9)   13  History of Palpitations (785 1) (R00 2)   14  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)   15  History of Shoulder joint pain, unspecified laterality   16  Urinary tract infection (599 0) (N39 0)    Allergies    1  Percocet TABS    Current Meds   1  Albuterol 90 MCG/ACT AERS; INHALE 1 TO 2 PUFFS EVERY 4 TO 6 HOURS AS   NEEDED; Therapy: (Recorded:43Zjz5202) to Recorded   2   BuPROPion HCl ER (XL) 300 MG Oral Tablet Extended Release 24 Hour; TAKE 1   TABLET Daily thity tablets; Therapy: 35WXU9725 to (Evaluate:23Jun2016)  Requested for: 80VMA0096; Last   Rx:35Skz7227 Ordered   3  Griselda 0 35 MG Oral Tablet; TAKE 1 TABLET DAILY; Therapy: 80AGI2553 to Recorded   4  Flonase 50 MCG/ACT Nasal Suspension; USE 1 SPRAY IN EACH NOSTRIL ONCE   DAILY; Therapy: (Recorded:91Hbw8281) to Recorded   5  HydrOXYzine HCl - 25 MG Oral Tablet; TAKE 1 TO 2 TABLETS AT BEDTIME; Last   Rx:44Uqf4428 Ordered   6  Metoprolol Succinate ER 50 MG Oral Tablet Extended Release 24 Hour; TAKE 1 TABLET   DAILY; Therapy: 14AHP2955 to (Evaluate:16Oct2016)  Requested for: 51HPP9982; Last   Rx:45Qxl6849 Ordered   7  Multivitamins TABS; 1 Every Day; Therapy: 14MLG9836 to  Requested for: 18PSO9068 Recorded   8  Pristiq 100 MG Oral Tablet Extended Release 24 Hour; Take 1 tablet daily; Therapy: 17GRC7837 to  Requested for: 41PIP4385 Recorded   9  Vyvanse 50 MG Oral Capsule; 1 capsule at 2:00pm;   Therapy: 86OPZ6102 to  Requested for: 71OBN4325 Recorded   10  Vyvanse 60 MG Oral Capsule; take 1 capsule daily; Therapy: (Recorded:86Oje1756) to Recorded   11  Zaleplon 10 MG Oral Capsule; TAKE 1 TO 2 CAPSULES AT BEDTIME AS NEEDED; Last    BT:16ZSH2991 Ordered    Family Psych History  Son    1  Family history of attention deficit hyperactivity disorder (ADHD) (V17 0) (Z81 8)  Sister    2  Family history of Bipolar 1 disorder  Family History    3  Family history of Asbestosis   4  Family history of Cancer   5  Family history of Diabetes Mellitus (V18 0)   6  Family history of Emphysema   7  Family history of Glaucoma   8  Family history of Heart Disease (V17 49)   9  Family history of Hypertension (V17 49)   10  Family history of Mixed Hyperlipoproteinemia    Social History    · College graduate   ·    · Employed   · Never A Smoker   · No history of alcohol use    Future Appointments    Date/Time Provider Specialty Site   05/26/2016 10:00 AM GEORGETTE Nelson   [facility] [place]   05/27/2016 10:00 AM GEORGETTE Nelson   [facility]     Signatures   Electronically signed by : Domo Herman RN; May 25 2016 10:26AM EST                       (Author)

## 2018-01-11 NOTE — PSYCH
History of Present Illness  [facility]:   Specialized Services Documentation - Therapist must complete separate progress note for each specific clinical activity in which the client participated during the day  Case Management Note: Individual Case Management visit not provided today  Malissa 21 excused today due to IOP status  Daya Katz, [facility]      Active Problems    1  ADHD, predominantly inattentive type (314 00) (F90 0)   2  Allergic rhinitis (477 9) (J30 9)   3  Anxiety and depression (300 00,311) (F41 9,F32 9)   4  Benign essential hypertension (401 1) (I10)   5  BMI 31 0-31 9,adult (V85 31) (Z68 31)   6  Cervicalgia (723 1) (M54 2)   7  Constipation (564 00) (K59 00)   8  Dysfunction of eustachian tube, unspecified laterality (381 81) (H69 80)   9  Encounter for screening mammogram for malignant neoplasm of breast (V76 12)   (Z12 31)   10  FORTINO (generalized anxiety disorder) (300 02) (F41 1)   11  Genital herpes simplex (054 10) (A60 00)   12  Headache (784 0) (R51)   13  Hip pain, unspecified laterality   14  Hypokalemia (276 8) (E87 6)   15  Lumbago (724 2) (M54 5)   16  Lyme disease (088 81) (A69 20)   17  Motion sickness (994 6) (T75 3XXA)   18  Obesity, Class I, BMI 30-34 9 (278 00) (E66 9)   19  Other muscle spasm (728 85) (M62 838)   20  Severe episode of recurrent major depressive disorder, without psychotic features    (296 33) (F33 2)   21  Tachycardia (785 0) (R00 0)   22  Thyroid disorder (246 9) (E07 9)   23  Well adult on routine health check (V70 0) (Z00 00)    Past Medical History    1  History of Acute bronchitis (466 0) (J20 9)   2  History of Acute pharyngitis (462) (J02 9)   3  History of Acute sinusitis (461 9) (J01 90)   4  History of Acute sinusitis (461 9) (J01 90)   5  History of Acute tonsillitis (463) (J03 90)   6  History of Acute upper respiratory infection (465 9) (J06 9)   7   History of Acute upper respiratory infection (465 9) (J06 9)   8  History of Acute UTI (599 0) (N39 0)   9  History of Cervicalgia (723 1) (M54 2)   10  Denied: History of Head trauma   11  History of acute pharyngitis (V12 69) (Z87 09)   12  History of migraine (V12 49) (Z86 69)   13  History of Nonallopathic lesion (739 9) (M99 9)   14  History of Palpitations (785 1) (R00 2)   15  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)   16  Denied: History of Seizure   17  History of Shoulder joint pain, unspecified laterality   18  Urinary tract infection (599 0) (N39 0)    Allergies    1  Percocet TABS    Current Meds   1  Albuterol 90 MCG/ACT AERS; INHALE 1 TO 2 PUFFS EVERY 4 TO 6 HOURS AS   NEEDED; Therapy: (Recorded:73Noi0028) to Recorded   2  BuPROPion HCl ER (XL) 300 MG Oral Tablet Extended Release 24 Hour; TAKE 1   TABLET Daily thity tablets; Therapy: 58BYE4176 to (Evaluate:23Jun2016)  Requested for: 54RHT4391; Last   Rx:80Scu2721 Ordered   3  CloNIDine HCl - 0 1 MG Oral Tablet; Take one tablet PO  AT 6:00 PM and 1 tablet PO AT   9:00 pm;   Therapy: (Recorded:84Qov1998) to Recorded   4  Griselda 0 35 MG Oral Tablet; TAKE 1 TABLET DAILY; Therapy: 36CYA4578 to Recorded   5  Flonase 50 MCG/ACT SUSP; USE 1 SPRAY IN EACH NOSTRIL ONCE DAILY; Therapy: (Recorded:55Jdb9215) to Recorded   6  Gabapentin 300 MG Oral Capsule; TAKE 3 CAPSULE Bedtime; Last Rx:49Dwi9707   Ordered   7  HydrOXYzine HCl - 50 MG Oral Tablet; TAKE 2 TABLET Bedtime; Last Rx:85Kjp2149   Ordered   8  Metoprolol Succinate ER 50 MG Oral Tablet Extended Release 24 Hour; TAKE 1 TABLET   DAILY; Therapy: 21NSC1414 to (Evaluate:16Oct2016)  Requested for: 19YAB7115; Last   Rx:67Hbf5415 Ordered   9  Multivitamins TABS; 1 Every Day; Therapy: 08KDN4759 to  Requested for: 76ZDK2587 Recorded   10  Pristiq 100 MG Oral Tablet Extended Release 24 Hour; Take 1 tablet daily; Therapy: 13YFE7107 to  Requested for: 41DZG2468 Recorded   11   Vyvanse 50 MG Oral Capsule; 1 capsule at 2:00pm;    Therapy: 97CSD5103 to  Requested for: 89GOD3254 Recorded   12  Vyvanse 60 MG Oral Capsule; take 1 capsule daily; Therapy: (Recorded:13Ntd8560) to Recorded   13  Zaleplon 10 MG Oral Capsule; TAKE 1 TO 2 CAPSULES AT BEDTIME AS NEEDED; Last    Rx:37Yib6381 Ordered    Family Psych History  Son    1  Family history of attention deficit hyperactivity disorder (ADHD) (V17 0) (Z81 8)  Sister    2  Family history of Bipolar 1 disorder  Family History    3  Family history of Asbestosis   4  Family history of Cancer   5  Family history of Diabetes Mellitus (V18 0)   6  Family history of Emphysema   7  Family history of Glaucoma   8  Family history of Heart Disease (V17 49)   9  Family history of Hypertension (V17 49)   10  Family history of Mixed Hyperlipoproteinemia    Social History    · College graduate   ·    · Employed   · Never A Smoker   · No history of alcohol use    Future Appointments    Date/Time Provider Specialty Site   09/28/2016 10:15 AM GEORGETTE Smart  Psychiatry [facility]   09/30/2016 10:15 AM GEORGETTE Smart   Psychiatry [facility]     Signatures   Electronically signed by : FAYE Nettles; Sep 27 2016  3:15PM EST                       (Author)

## 2018-01-11 NOTE — PSYCH
History of Present Illness  [facility] Clinical Progress Note [facility]:   Specialized Services Documentation - Therapist must complete separate progress note for each specific clinical activity in which the client participated during the day  (605) Group Psychotherapy: 10:45 to 11:45 Neena participated in a group discussing Weekly Wellness  She was a good contributor to the discussion group  She was able to look at what was happening to her and what things she could use to handle them  She said that she learned that prioritizing creates a structure  She liked that she was more honest this week completing the form than she was last week  She continues to benefit from group participation  Jaswant Graves LPC  Treatment Plan Problem(s): 1 1, 1 2          (302) Education Therapy Goals set - find a way to self soothe    Treatment Plan Problem(s): 1 1  Education Therapy Time - 0900 - 0930 Previous goal was met  Readiness to Learning:  She is receptive to learning  There are  no barriers to learning  Learning Assessment Time - 1330 - 1400   Education completed on  illness and wellness tools  The teaching method was  verbal  Shared area of learning: Yes  FAYE Joyner     (513) Allied Therapy [id number] Neena actively shared in [facility] group focused on motivation  She engaged in mary jo discussion and tasks exploring definition of, challenges to and ways to improve motivation  Group explored CBT and BA techniques to counteract limiting beliefs and set self up for success  She identified how she can frequently hold herself accountable to others yet intrinsic motivation is very difficult for her  Personal worth and confidence explore as a barrier and specific ways to begin to challenge limiting beliefs practiced  Slow progress noted toward goals as she was attentive yet minimal self-disclosure  Continue AT to explore wellness tools and encourage active practice  Treatment Plan Problem(s): 1 2   Karla Sauer [place], [facility]       Case Management Note:   12:45 -13:13 This worker met with Neena who said that she had been very groggy this morning after taking an attarax for sleep last night  She had been given ambien in the hospital and found it to have less of an effect  She wondered if she could have a prescription for that  She stated that she needs note for work saying that she is here under a Doctor's care  She would need the note signed by the doctor  She had one auditory hallucination this morning but she feels that they are getting less  She was informed that she has been given 3 more days by the insurance company  She will be moving some things to her new apartment and maybe go shopping with her children  She also talked about her boyfriend who is scheduled for his 3rd chemo treatment on 6/3  He did not do well with the the 1st and 2nd treatments and she is concerned that this one will be more problematic since the second was worse than the first  The counseling session centered around how she will handle the difficulties of the next period of time  She would like a miracle but she does not see that happening  It was suggested to her to think about things that she can do for herself when/if something happens to him  She said that she has learned that keeping busy helps her when she is feeling stressed  She talked of doing puzzles, journaling and painting  She also talked of doing more praying  Monica Rides LPC   TREATMENT SESSION NUMBER: 5     Medications changed/added/denied:       Active Problems    1  ADHD, predominantly inattentive type (314 00) (F90 0)   2  Allergic rhinitis (477 9) (J30 9)   3  Anxiety and depression (300 00,311) (F41 9,F32 9)   4  Benign essential hypertension (401 1) (I10)   5  BMI 31 0-31 9,adult (V85 31) (Z68 31)   6  Cervicalgia (723 1) (M54 2)   7  Constipation (564 00) (K59 00)   8  Dysfunction of eustachian tube, unspecified laterality (381 81) (H69 80)   9   Encounter for screening mammogram for malignant neoplasm of breast (V76 12)   (Z12 31)   10  FORTINO (generalized anxiety disorder) (300 02) (F41 1)   11  Genital herpes simplex (054 10) (A60 00)   12  Headache (784 0) (R51)   13  Hip pain, unspecified laterality   14  Hypokalemia (276 8) (E87 6)   15  Lumbago (724 2) (M54 5)   16  Lyme disease (088 81) (A69 20)   17  Motion sickness (994 6) (T75 3XXA)   18  Obesity, Class I, BMI 30-34 9 (278 00) (E66 9)   19  Other muscle spasm (728 85) (M62 838)   20  Severe episode of recurrent major depressive disorder, without psychotic features    (296 33) (F33 2)   21  Tachycardia (785 0) (R00 0)   22  Thyroid disorder (246 9) (E07 9)   23  Well adult on routine health check (V70 0) (Z00 00)    Past Medical History    1  History of Acute bronchitis (466 0) (J20 9)   2  History of Acute pharyngitis (462) (J02 9)   3  History of Acute sinusitis (461 9) (J01 90)   4  History of Acute sinusitis (461 9) (J01 90)   5  History of Acute tonsillitis (463) (J03 90)   6  History of Acute upper respiratory infection (465 9) (J06 9)   7  History of Acute upper respiratory infection (465 9) (J06 9)   8  History of Acute UTI (599 0) (N39 0)   9  History of Cervicalgia (723 1) (M54 2)   10  History of acute pharyngitis (V12 69) (Z87 09)   11  History of migraine (V12 49) (Z86 69)   12  History of Nonallopathic lesion (739 9) (M99 9)   13  History of Palpitations (785 1) (R00 2)   14  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)   15  History of Shoulder joint pain, unspecified laterality   16  Urinary tract infection (599 0) (N39 0)    Allergies    1  Percocet TABS    Current Meds   1  Albuterol 90 MCG/ACT AERS; INHALE 1 TO 2 PUFFS EVERY 4 TO 6 HOURS AS   NEEDED; Therapy: (Recorded:26Cmj3830) to Recorded   2  BuPROPion HCl ER (XL) 300 MG Oral Tablet Extended Release 24 Hour; TAKE 1   TABLET Daily thity tablets; Therapy: 75VRQ9047 to (Evaluate:23Jun2016)  Requested for: 26AJK2158; Last   Rx:24May2016 Ordered   3  Griselda 0 35 MG Oral Tablet; TAKE 1 TABLET DAILY; Therapy: 42DMA2266 to Recorded   4  Flonase 50 MCG/ACT Nasal Suspension; USE 1 SPRAY IN EACH NOSTRIL ONCE   DAILY; Therapy: (Recorded:43Nzx8553) to Recorded   5  HydrOXYzine HCl - 25 MG Oral Tablet; TAKE 1 TO 2 TABLETS AT BEDTIME; Last   Rx:33Rez7716 Ordered   6  Metoprolol Succinate ER 50 MG Oral Tablet Extended Release 24 Hour; TAKE 1 TABLET   DAILY; Therapy: 15KQG5011 to (Evaluate:16Oct2016)  Requested for: 46PTA7197; Last   Rx:31Ill6390 Ordered   7  Multivitamins TABS; 1 Every Day; Therapy: 06ZKF2682 to  Requested for: 22HLE7629 Recorded   8  Pristiq 100 MG Oral Tablet Extended Release 24 Hour; Take 1 tablet daily; Therapy: 27XXF6419 to  Requested for: 46OFQ8385 Recorded   9  Vyvanse 50 MG Oral Capsule; 1 capsule at 2:00pm;   Therapy: 17MXW9895 to  Requested for: 83IVW1744 Recorded   10  Vyvanse 60 MG Oral Capsule; take 1 capsule daily; Therapy: (Recorded:74Uhx6198) to Recorded   11  Zaleplon 10 MG Oral Capsule; TAKE 1 TO 2 CAPSULES AT BEDTIME AS NEEDED; Last    PARHAM:[id number] Ordered    Family Psych History  Son    1  Family history of attention deficit hyperactivity disorder (ADHD) (V17 0) (Z81 8)  Sister    2  Family history of Bipolar 1 disorder  Family History    3  Family history of Asbestosis   4  Family history of Cancer   5  Family history of Diabetes Mellitus (V18 0)   6  Family history of Emphysema   7  Family history of Glaucoma   8  Family history of Heart Disease (V17 49)   9  Family history of Hypertension (V17 49)   10  Family history of Mixed Hyperlipoproteinemia    Social History    · College graduate   ·    · Employed   · Never A Smoker   · No history of alcohol use    Future Appointments    Date/Time Provider Specialty Site   06/03/2016 10:30 AM Krishan Michaud MD Psychiatry [facility]   05/31/2016 10:00 AM GEORGETTE Mccoy   Psychiatry [facility]   06/01/2016 10:45 AM GEORGETTE Pozo  Psychiatry [facility]   06/02/2016 10:15 AM GEORGETTE Pozo  Psychiatry [facility]     Signatures   Electronically signed by : Linda Braden [facility]; May 27 2016  1:43PM EST                       (Author)    Electronically signed by : Lindavance Braden [facility]; May 27 2016  2:21PM EST                       (Author)    Electronically signed by : FAYE Henderson; May 27 2016  2:27PM EST                       (Author)    Electronically signed by :  Linda Braden [facility]; May 27 2016  3:13PM EST                       (Author)

## 2018-01-11 NOTE — PSYCH
History of Present Illness  [facility] Progress Note [facility]:   Specialized Services Documentation - Therapist must complete separate progress note for each specific clinical activity in which the client participated during the day  [phone]) Group Psychotherapy: [phone]) Neena attended psychotherapy group dealing with themes of healthy boundary setting and communication  Neena identified communication with her ex- as a stressor  She discussed arguing with him over the weekend, and said that she was able to tell him that she could no longer talk to him because he was making her angry  She was able to identify that she had set new boundaries with him that she has not been able to do in the past, and that she realized it felt good to do so  She offered supportive feedback to her peers as well  Moderate progress noted toward goal  Continue group to offer patient the opportunity to explore stressors and coping  Treatment Plan Problem(s): 1 1, 1 2  Jack Burroughs MSW, LSW     (300) Group Psychotherapy: [phone] Tom Mcduffie participated in wellness group focused on discussion of latest research studies on type of socialization that most effectively decreases depression and increases well-being  Pt shared that she has not "fully" moved into her new apt as yet and she does not feel lonely at this time in her life but she is social and will call a friend, go out for a walk, or distract herself with other things if she does become lonely  Pt made mild progress toward goal  Continue to offer group to educate pt on wellness strategies to cope most effectively with loneliness and isolation to decrease depressed mood  Treatment Plan Problem(s): 1 1,1 2  Lobo Gamble RN       (205) Education Therapy Goals set - have fun with kids this afternoon    Treatment Plan Problem(s): 1 1, 1 2  Education Therapy Time - 0900 - 0930 Previous goal was met  Readiness to Learning:  She is receptive to learning     There are  no barriers to learning  Learning Assessment Time - 2470 - 1400   Education completed on  illness and wellness tools  The teaching method was  verbal  Shared area of learning: Yes  Juanis Haskinsjamel MSW, LSW     [phone] Riky Messina actively shared in [facility] group focused on managing anger  She engaged in task exploring effective versus ineffective ways in addition to skills to refocus in the moment  She identified a challenge as determining when she is angry as she easily gets flooded with many emotions  Group explored the benefits of emotional control and positive choices with intense emotion  Strategies to slow down reactivity practiced  Some effort toward goal noted  Continue AT to explore wellness tool awareness and practice  1 5  Treatment Plan Problem(s): 1 5  Asmita Pino, [facility]       Case Management Note:   [id number] Neena met with CM to discuss that her fiance is back in hospital in ICU and she cries when she is with him and when she is apart from him  Pt stated that she is already grieving loss of fiance although he is still living  She stated she is his primary support emotionally  Navid Ni had discussed contacting visiting nurse service that comes to home to care for her fiance and following up with the  to obtain contact for a grief support group  She was unable to do so on Friday  Pt states she is trying to recall the positives in their relationship and dwell on those but she reverts very quickly back to thinking about how little time he and she have left together  Pt was encouraged to take care of her mental health to be able to provide positive support not only to herself but also her fithaddeus  TREATMENT SESSION NUMBER: 9   Current suicide risk is low  Medications not changed/added/denied  Jose Lawton RN      Active Problems    1  ADHD, predominantly inattentive type (314 00) (F90 0)   2  Allergic rhinitis (477 9) (J30 9)   3   Anxiety and depression (300 00,311) (F41 9,F32 9) 4  Benign essential hypertension (401 1) (I10)   5  BMI 31 0-31 9,adult (V85 31) (Z68 31)   6  Cervicalgia (723 1) (M54 2)   7  Constipation (564 00) (K59 00)   8  Dysfunction of eustachian tube, unspecified laterality (381 81) (H69 80)   9  Encounter for screening mammogram for malignant neoplasm of breast (V76 12)   (Z12 31)   10  FORTINO (generalized anxiety disorder) (300 02) (F41 1)   11  Genital herpes simplex (054 10) (A60 00)   12  Headache (784 0) (R51)   13  Hip pain, unspecified laterality   14  Hypokalemia (276 8) (E87 6)   15  Lumbago (724 2) (M54 5)   16  Lyme disease (088 81) (A69 20)   17  Motion sickness (994 6) (T75 3XXA)   18  Obesity, Class I, BMI 30-34 9 (278 00) (E66 9)   19  Other muscle spasm (728 85) (M62 838)   20  Severe episode of recurrent major depressive disorder, without psychotic features    (296 33) (F33 2)   21  Tachycardia (785 0) (R00 0)   22  Thyroid disorder (246 9) (E07 9)   23  Well adult on routine health check (V70 0) (Z00 00)    Past Medical History    1  History of Acute bronchitis (466 0) (J20 9)   2  History of Acute pharyngitis (462) (J02 9)   3  History of Acute sinusitis (461 9) (J01 90)   4  History of Acute sinusitis (461 9) (J01 90)   5  History of Acute tonsillitis (463) (J03 90)   6  History of Acute upper respiratory infection (465 9) (J06 9)   7  History of Acute upper respiratory infection (465 9) (J06 9)   8  History of Acute UTI (599 0) (N39 0)   9  History of Cervicalgia (723 1) (M54 2)   10  History of acute pharyngitis (V12 69) (Z87 09)   11  History of migraine (V12 49) (Z86 69)   12  History of Nonallopathic lesion (739 9) (M99 9)   13  History of Palpitations (785 1) (R00 2)   14  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)   15  History of Shoulder joint pain, unspecified laterality   16  Urinary tract infection (599 0) (N39 0)    Allergies    1  Percocet TABS    Current Meds   1   Albuterol 90 MCG/ACT AERS; INHALE 1 TO 2 PUFFS EVERY 4 TO 6 HOURS AS NEEDED; Therapy: (Recorded:61Xpy3454) to Recorded   2  BuPROPion HCl ER (XL) 300 MG Oral Tablet Extended Release 24 Hour; TAKE 1   TABLET Daily thity tablets; Therapy: 47KJP4352 to (Evaluate:23Jun2016)  Requested for: 14AYR4746; Last   Rx:24May2016 Ordered   3  Griselda 0 35 MG Oral Tablet; TAKE 1 TABLET DAILY; Therapy: 34WQW5330 to Recorded   4  Flonase 50 MCG/ACT SUSP; USE 1 SPRAY IN EACH NOSTRIL ONCE DAILY; Therapy: (Recorded:20May2016) to Recorded   5  HydrOXYzine HCl - 25 MG Oral Tablet; TAKE 1 TO 2 TABLETS AT BEDTIME; Last   Rx:69Zlh6940 Ordered   6  Metoprolol Succinate ER 50 MG Oral Tablet Extended Release 24 Hour; TAKE 1 TABLET   DAILY; Therapy: 64CHC3091 to (Evaluate:16Oct2016)  Requested for: 05YCE7788; Last   Rx:19Apr2016 Ordered   7  Multivitamins TABS; 1 Every Day; Therapy: 99EZQ9285 to  Requested for: 67USP6327 Recorded   8  Pristiq 100 MG Oral Tablet Extended Release 24 Hour; Take 1 tablet daily; Therapy: 46DAB2165 to  Requested for: 63EKJ3621 Recorded   9  Vyvanse 50 MG Oral Capsule; 1 capsule at 2:00pm;   Therapy: 93GTE7773 to  Requested for: 09KDJ1661 Recorded   10  Vyvanse 60 MG Oral Capsule; take 1 capsule daily; Therapy: (Recorded:24May2016) to Recorded   11  Zaleplon 10 MG Oral Capsule; TAKE 1 TO 2 CAPSULES AT BEDTIME AS NEEDED; Last    WO:77WIY5477 Ordered    Family Psych History  Son    1  Family history of attention deficit hyperactivity disorder (ADHD) (V17 0) (Z81 8)  Sister    2  Family history of Bipolar 1 disorder  Family History    3  Family history of Asbestosis   4  Family history of Cancer   5  Family history of Diabetes Mellitus (V18 0)   6  Family history of Emphysema   7  Family history of Glaucoma   8  Family history of Heart Disease (V17 49)   9  Family history of Hypertension (V17 49)   10   Family history of Mixed Hyperlipoproteinemia    Social History    · College graduate   ·    · Employed   · Never A Smoker   · No history of alcohol use    Future Appointments    Date/Time Provider Specialty Site   06/07/2016 10:00 AM GEORGETTE Christie  Psychiatry [facility]   06/08/2016 10:30 AM GEORGETTE Christie  Psychiatry [facility]   06/09/2016 10:30 AM GEORGETTE Christie   Psychiatry [facility]   07/14/2016 11:00 AM Paulette Hankins [place]     Signatures   Electronically signed by : JONATHAN Reyes; Jun 6 2016 12:15PM EST                       (Author)    Electronically signed by : JONATHAN Reyes; Jun 6 2016  2:11PM EST                       (Author)    Electronically signed by : FAYE Mcdonald; Jun 6 2016  2:24PM EST                       (Author)    Electronically signed by : Betsy Kumar RN; Jun 6 2016  5:19PM EST                       (Author)

## 2018-01-11 NOTE — PSYCH
History of Present Illness  [facility]:   Specialized Services Documentation - Therapist must complete separate progress note for each specific clinical activity in which the client participated during the day  (109) Group Psychotherapy: (9:30-10:30) Neena participated in psychotherapy group focused on triggers and setting boundaries in relationships  Neena identified triggers such as things she sees on tv bringing down her mood as a stressor  She discussed concern about not being able to bring herself out of a bad mood after experiencing a trigger, and that she starts questioning everything  Group discussed ways to try to refocus thoughts and activities to try to improve mood  Moderate progress noted toward goals  Discharge at the end of treatment day today  Treatment Plan Problem(s): 1 1, 1 2  Brenda Mcclelland MSW, LSW     (842) Group Psychotherapy: [id number] Kiet Basilio participated in weekly wellness group to discuss what particular area of wellness that has been worked on up to today in Program and choice of area to continue working on for recovery as targeted in treatment plan, by choice or in WRAP  Neena shared that she will be working on decreasing isolation and increasing social interaction after D/C today  Neena used the example of volunteering as a way to increase healthy socialization and support  Neena made good progress toward goals  D/C today       Treatment Plan Problem(s): 1 1,1 2  Luz Elena Castillo RN       (097) Education Therapy Goals set - plan ahead for weekend; Neena shared encouragement with peers on last treatment day    Treatment Plan Problem(s): 1 1, 1 2  Education Therapy Time - 0900 - 0930 Previous goal was not met  Readiness to Learning:  She is receptive to learning  There are  no barriers to learning  Learning Assessment Time - 1330 - 1400   Education completed on  illness and wellness tools  The teaching method was  verbal  Shared area of learning: Yes   Adriana Guerra Stevie MSW, LSW     (632) Allied Therapy [id number] Neena actively shared in [facility] group focused on assertiveness  Exercise explored communication styles and value of assertiveness as discussion focused on ways to increase assertiveness  She shared an increased awareness of her tendency to be passive and that in the end this style does not âhelpâ her  Group discussed impact on treatment and ways to increase assertive behavior in getting needs met  Some effort noted toward goal  Discharge at the end of treatment day  Treatment Plan Problem(s): 1 6  Nabila Edwards, [facility] [place]       Case Management Note:   [facility] 11 ([phone]) Met with Neena  She had written out answers to her treatment plan and reported dc readiness  She identified improvement in personal responsibility, daily goals and positive thinking  She denied SI, HI, psychosis  She reported medications were changed by psychiatrist on Wednesday (OP)  OP providers to receive summary  Aftercare plan, medication list and relapse prevention plan she complete reviewed and copies given to Lucie Canas DC end of treatment day  TREATMENT SESSION NUMBER: 18   Current suicide risk is low  Medications changed/added/denied: per Dr Mc Jenkins, [facility] [place]   [facility] Follow-up Physician's Orders:   10/7/2016  8:14 AM Discharge Today   MD Atul George, RN      Active Problems    1  ADHD, predominantly inattentive type (314 00) (F90 0)   2  Allergic rhinitis (477 9) (J30 9)   3  Anxiety and depression (300 00,311) (F41 9,F32 9)   4  Benign essential hypertension (401 1) (I10)   5  BMI 31 0-31 9,adult (V85 31) (Z68 31)   6  Cervicalgia (723 1) (M54 2)   7  Constipation (564 00) (K59 00)   8  Dysfunction of eustachian tube, unspecified laterality (381 81) (H69 80)   9  Encounter for screening mammogram for malignant neoplasm of breast (V76 12)   (Z12 31)   10  FORTINO (generalized anxiety disorder) (300 02) (F41 1)   11   Genital herpes simplex (054 10) (A60 00)   12  Headache (784 0) (R51)   13  Hip pain, unspecified laterality   14  Hypokalemia (276 8) (E87 6)   15  Lumbago (724 2) (M54 5)   16  Lyme disease (088 81) (A69 20)   17  Motion sickness (994 6) (T75 3XXA)   18  Obesity, Class I, BMI 30-34 9 (278 00) (E66 9)   19  Other muscle spasm (728 85) (M62 838)   20  Severe episode of recurrent major depressive disorder, without psychotic features    (296 33) (F33 2)   21  Tachycardia (785 0) (R00 0)   22  Thyroid disorder (246 9) (E07 9)   23  Well adult on routine health check (V70 0) (Z00 00)    Past Medical History    1  History of Acute bronchitis (466 0) (J20 9)   2  History of Acute pharyngitis (462) (J02 9)   3  History of Acute sinusitis (461 9) (J01 90)   4  History of Acute sinusitis (461 9) (J01 90)   5  History of Acute tonsillitis (463) (J03 90)   6  History of Acute upper respiratory infection (465 9) (J06 9)   7  History of Acute upper respiratory infection (465 9) (J06 9)   8  History of Acute UTI (599 0) (N39 0)   9  History of Cervicalgia (723 1) (M54 2)   10  Denied: History of Head trauma   11  History of acute pharyngitis (V12 69) (Z87 09)   12  History of migraine (V12 49) (Z86 69)   13  History of Nonallopathic lesion (739 9) (M99 9)   14  History of Palpitations (785 1) (R00 2)   15  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)   16  Denied: History of Seizure   17  History of Shoulder joint pain, unspecified laterality   18  Urinary tract infection (599 0) (N39 0)    Allergies    1  Percocet TABS    Current Meds   1  Albuterol 90 MCG/ACT AERS; INHALE 1 TO 2 PUFFS EVERY 4 TO 6 HOURS AS   NEEDED; Therapy: (Recorded:90Bht3682) to Recorded   2  BuPROPion HCl ER (XL) 300 MG Oral Tablet Extended Release 24 Hour; TAKE 1   TABLET Daily thity tablets; Therapy: 96TIB7895 to (Evaluate:23Jun2016)  Requested for: 60SUH4387; Last   Rx:71Ayk5035 Ordered   3  CloNIDine HCl - 0 1 MG Oral Tablet;  Take one tablet PO  AT 6:00 PM and 1 tablet PO AT   9:00 pm;   Therapy: (Recorded:49Rkt8583) to Recorded   4  Griselda 0 35 MG Oral Tablet; TAKE 1 TABLET DAILY; Therapy: 59BWJ5124 to Recorded   5  Flonase 50 MCG/ACT SUSP; USE 1 SPRAY IN EACH NOSTRIL ONCE DAILY; Therapy: (Recorded:00Hwr1643) to Recorded   6  Gabapentin 300 MG Oral Capsule; TAKE 3 CAPSULE Bedtime; Last Rx:13Wjd1964   Ordered   7  HydrOXYzine HCl - 50 MG Oral Tablet; TAKE 2 TABLET Bedtime; Last Rx:82Wfb9610   Ordered   8  Metoprolol Succinate ER 50 MG Oral Tablet Extended Release 24 Hour; TAKE 1 TABLET   DAILY; Therapy: 69SAT8574 to (Evaluate:76Ram7805)  Requested for: 40HFF8148; Last   Rx:58Zdb5963 Ordered   9  Multivitamins TABS; 1 Every Day; Therapy: 53TAT0839 to  Requested for: 49WSG8063 Recorded   10  Pristiq 100 MG Oral Tablet Extended Release 24 Hour; Take 1 tablet daily; Therapy: 46DEE9420 to  Requested for: 51MMV4573 Recorded   11  Vyvanse 50 MG Oral Capsule; 1 capsule at 2:00pm;    Therapy: 33MNJ1472 to  Requested for: 36WCV4126 Recorded   12  Vyvanse 60 MG Oral Capsule; take 1 capsule daily; Therapy: (Recorded:47Kjm8106) to Recorded   13  Zaleplon 10 MG Oral Capsule; TAKE 1 TO 2 CAPSULES AT BEDTIME AS NEEDED; Last    Rx:57Xfw8497 Ordered    Family Psych History  Son    1  Family history of attention deficit hyperactivity disorder (ADHD) (V17 0) (Z81 8)  Sister    2  Family history of Bipolar 1 disorder  Family History    3  Family history of Asbestosis   4  Family history of Cancer   5  Family history of Diabetes Mellitus (V18 0)   6  Family history of Emphysema   7  Family history of Glaucoma   8  Family history of Heart Disease (V17 49)   9  Family history of Hypertension (V17 49)   10  Family history of Mixed Hyperlipoproteinemia    Social History    · College graduate   ·    · Employed   · Never A Smoker   · No history of alcohol use    Future Appointments    Date/Time Provider Specialty Site   10/07/2016 10:00 AM GEORGETTE Daigle   [facility] HOSPITALIZATION     Signatures   Electronically signed by : GEORGETTE Blum ; Oct  7 2016  8:15AM EST                       (Author)    Electronically signed by : Jami Chowdhury RN; Oct  7 2016 12:12PM EST                       (Author)    Electronically signed by : JONATHAN Au; Oct  7 2016  3:06PM EST                       (Author)    Electronically signed by : FAYE Maharaj; Oct  7 2016  3:50PM EST                       (Author)    Electronically signed by : Jami Chowdhury RN; Oct  7 2016  3:58PM EST                       (Author)

## 2018-01-11 NOTE — PSYCH
History of Present Illness  [facility]:   Specialized Services Documentation - Therapist must complete separate progress note for each specific clinical activity in which the client participated during the day  Case Management Note: Individual Case Management visit not provided today  Juan Luis Domingo excused due to IOP status  [address] [facility]      Active Problems    1  ADHD, predominantly inattentive type (314 00) (F90 0)   2  Allergic rhinitis (477 9) (J30 9)   3  Anxiety and depression (300 00,311) (F41 9,F32 9)   4  Benign essential hypertension (401 1) (I10)   5  BMI 31 0-31 9,adult (V85 31) (Z68 31)   6  Cervicalgia (723 1) (M54 2)   7  Constipation (564 00) (K59 00)   8  Dysfunction of eustachian tube, unspecified laterality (381 81) (H69 80)   9  Encounter for screening mammogram for malignant neoplasm of breast (V76 12)   (Z12 31)   10  FORTINO (generalized anxiety disorder) (300 02) (F41 1)   11  Genital herpes simplex (054 10) (A60 00)   12  Headache (784 0) (R51)   13  Hip pain, unspecified laterality   14  Hypokalemia (276 8) (E87 6)   15  Lumbago (724 2) (M54 5)   16  Lyme disease (088 81) (A69 20)   17  Motion sickness (994 6) (T75 3XXA)   18  Obesity, Class I, BMI 30-34 9 (278 00) (E66 9)   19  Other muscle spasm (728 85) (M62 838)   20  Severe episode of recurrent major depressive disorder, without psychotic features    (296 33) (F33 2)   21  Tachycardia (785 0) (R00 0)   22  Thyroid disorder (246 9) (E07 9)   23  Well adult on routine health check (V70 0) (Z00 00)    Past Medical History    1  History of Acute bronchitis (466 0) (J20 9)   2  History of Acute pharyngitis (462) (J02 9)   3  History of Acute sinusitis (461 9) (J01 90)   4  History of Acute sinusitis (461 9) (J01 90)   5  History of Acute tonsillitis (463) (J03 90)   6  History of Acute upper respiratory infection (465 9) (J06 9)   7   History of Acute upper respiratory infection (220 9) (J06 9)   8  History of Acute UTI (599 0) (N39 0)   9  History of Cervicalgia (723 1) (M54 2)   10  Denied: History of Head trauma   11  History of acute pharyngitis (V12 69) (Z87 09)   12  History of migraine (V12 49) (Z86 69)   13  History of Nonallopathic lesion (739 9) (M99 9)   14  History of Palpitations (785 1) (R00 2)   15  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)   16  Denied: History of Seizure   17  History of Shoulder joint pain, unspecified laterality   18  Urinary tract infection (599 0) (N39 0)    Allergies    1  Percocet TABS    Current Meds   1  Albuterol 90 MCG/ACT AERS; INHALE 1 TO 2 PUFFS EVERY 4 TO 6 HOURS AS   NEEDED; Therapy: (Recorded:05Ioe4831) to Recorded   2  BuPROPion HCl ER (XL) 300 MG Oral Tablet Extended Release 24 Hour (Wellbutrin XL);   TAKE 1 TABLET Daily thity tablets; Therapy: 34MNN3415 to (Evaluate:23Jun2016)  Requested for: 79UTN3317; Last   Rx:13Sjb7893 Ordered   3  CloNIDine HCl - 0 1 MG Oral Tablet; Take one tablet PO  AT 6:00 PM and 1 tablet PO AT   9:00 pm;   Therapy: (Recorded:64Sxh0718) to Recorded   4  Griselda 0 35 MG Oral Tablet; TAKE 1 TABLET DAILY; Therapy: 86ULS0868 to Recorded   5  Flonase 50 MCG/ACT SUSP (Fluticasone Propionate); USE 1 SPRAY IN EACH NOSTRIL   ONCE DAILY; Therapy: (Recorded:66Rnh3051) to Recorded   6  Gabapentin 300 MG Oral Capsule; TAKE 3 CAPSULE Bedtime; Last Rx:35Iao6195   Ordered   7  HydrOXYzine HCl - 50 MG Oral Tablet; TAKE 2 TABLET Bedtime; Last Rx:03Aeb1424   Ordered   8  Metoprolol Succinate ER 50 MG Oral Tablet Extended Release 24 Hour; TAKE 1 TABLET   DAILY; Therapy: 36VJM6949 to (Evaluate:16Oct2016)  Requested for: 89QZD2903; Last   Rx:47Wko7070 Ordered   9  Multivitamins TABS; 1 Every Day; Therapy: 34TDK5762 to  Requested for: 49QVM5040 Recorded   10  Pristiq 100 MG Oral Tablet Extended Release 24 Hour; Take 1 tablet daily; Therapy: 72WNJ2024 to  Requested for: 24RKO9878 Recorded   11   Vyvanse 50 MG Oral Capsule; 1 capsule at 2:00pm;    Therapy: 96RFT1293 to  Requested for: 08EPA5581 Recorded   12  Vyvanse 60 MG Oral Capsule; take 1 capsule daily; Therapy: (Recorded:79Ijl6494) to Recorded   13  Zaleplon 10 MG Oral Capsule; TAKE 1 TO 2 CAPSULES AT BEDTIME AS NEEDED; Last    XT:83XZX0734 Ordered    Family Psych History  Son    1  Family history of attention deficit hyperactivity disorder (ADHD) (V17 0) (Z81 8)  Sister    2  Family history of Bipolar 1 disorder  Family History    3  Family history of Asbestosis   4  Family history of Cancer   5  Family history of Diabetes Mellitus (V18 0)   6  Family history of Emphysema   7  Family history of Glaucoma   8  Family history of Heart Disease (V17 49)   9  Family history of Hypertension (V17 49)   10  Family history of Mixed Hyperlipoproteinemia    Social History    · College graduate   ·    · Employed   · Never A Smoker   · No history of alcohol use    Future Appointments    Date/Time Provider Specialty Site   09/21/2016 10:15 AM GEORGETTE Smart   Psychiatry [facility] PARTIAL HOSPITALIZATION   09/23/2016 10:15 AM Tawanda Valera DO Psychiatry [facility] PARTIAL HOSPITALIZATION   09/20/2016 11:00 AM Paulette Hankins [address] THERAPISTS     Signatures   Electronically signed by : FAYE Nettles; Sep 20 2016  7:51AM EST                       (Author)

## 2018-01-11 NOTE — PSYCH
History of Present Illness  [facility]:   Specialized Services Documentation - Therapist must complete separate progress note for each specific clinical activity in which the client participated during the day  Case Management Note: Individual Case Management visit not provided today  [address] excused related to IOP status  Review for IOP status scheduled with Kathy Lugo at [facility] 9/30/16 at 9:45am - [phone]  Asmita Pino MT-BC      Active Problems    1  ADHD, predominantly inattentive type (314 00) (F90 0)   2  Allergic rhinitis (477 9) (J30 9)   3  Anxiety and depression (300 00,311) (F41 9,F32 9)   4  Benign essential hypertension (401 1) (I10)   5  BMI 31 0-31 9,adult (V85 31) (Z68 31)   6  Cervicalgia (723 1) (M54 2)   7  Constipation (564 00) (K59 00)   8  Dysfunction of eustachian tube, unspecified laterality (381 81) (H69 80)   9  Encounter for screening mammogram for malignant neoplasm of breast (V76 12)   (Z12 31)   10  FORTINO (generalized anxiety disorder) (300 02) (F41 1)   11  Genital herpes simplex (054 10) (A60 00)   12  Headache (784 0) (R51)   13  Hip pain, unspecified laterality   14  Hypokalemia (276 8) (E87 6)   15  Lumbago (724 2) (M54 5)   16  Lyme disease (088 81) (A69 20)   17  Motion sickness (994 6) (T75 3XXA)   18  Obesity, Class I, BMI 30-34 9 (278 00) (E66 9)   19  Other muscle spasm (728 85) (M62 838)   20  Severe episode of recurrent major depressive disorder, without psychotic features    (296 33) (F33 2)   21  Tachycardia (785 0) (R00 0)   22  Thyroid disorder (246 9) (E07 9)   23  Well adult on routine health check (V70 0) (Z00 00)    Past Medical History    1  History of Acute bronchitis (466 0) (J20 9)   2  History of Acute pharyngitis (462) (J02 9)   3  History of Acute sinusitis (461 9) (J01 90)   4  History of Acute sinusitis (461 9) (J01 90)   5  History of Acute tonsillitis (463) (J03 90)   6   History of Acute upper respiratory infection (465 9) (J06 9)   7  History of Acute upper respiratory infection (465 9) (J06 9)   8  History of Acute UTI (599 0) (N39 0)   9  History of Cervicalgia (723 1) (M54 2)   10  Denied: History of Head trauma   11  History of acute pharyngitis (V12 69) (Z87 09)   12  History of migraine (V12 49) (Z86 69)   13  History of Nonallopathic lesion (739 9) (M99 9)   14  History of Palpitations (785 1) (R00 2)   15  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)   16  Denied: History of Seizure   17  History of Shoulder joint pain, unspecified laterality   18  Urinary tract infection (599 0) (N39 0)    Allergies    1  Percocet TABS    Current Meds   1  Albuterol 90 MCG/ACT AERS; INHALE 1 TO 2 PUFFS EVERY 4 TO 6 HOURS AS   NEEDED; Therapy: (Recorded:97Qgq6037) to Recorded   2  BuPROPion HCl ER (XL) 300 MG Oral Tablet Extended Release 24 Hour; TAKE 1   TABLET Daily thity tablets; Therapy: 07SJU2698 to (Evaluate:23Jun2016)  Requested for: 90LYN8886; Last   Rx:31Ltn9479 Ordered   3  CloNIDine HCl - 0 1 MG Oral Tablet; Take one tablet PO  AT 6:00 PM and 1 tablet PO AT   9:00 pm;   Therapy: (Recorded:30Aeb3473) to Recorded   4  Griselda 0 35 MG Oral Tablet; TAKE 1 TABLET DAILY; Therapy: 29PEC2625 to Recorded   5  Flonase 50 MCG/ACT SUSP; USE 1 SPRAY IN EACH NOSTRIL ONCE DAILY; Therapy: (Recorded:51Ntp2052) to Recorded   6  Gabapentin 300 MG Oral Capsule; TAKE 3 CAPSULE Bedtime; Last Rx:47Uqw1174   Ordered   7  HydrOXYzine HCl - 50 MG Oral Tablet; TAKE 2 TABLET Bedtime; Last Rx:50Xjt9714   Ordered   8  Metoprolol Succinate ER 50 MG Oral Tablet Extended Release 24 Hour; TAKE 1 TABLET   DAILY; Therapy: 08AJF2902 to (Evaluate:16Oct2016)  Requested for: 92OEV1725; Last   Rx:96Fon1899 Ordered   9  Multivitamins TABS; 1 Every Day; Therapy: 17KBO2113 to  Requested for: 77OTD1342 Recorded   10  Pristiq 100 MG Oral Tablet Extended Release 24 Hour; Take 1 tablet daily;     Therapy: 73NAM0364 to  Requested for: 30MDP4920 Recorded   11  Vyvanse 50 MG Oral Capsule; 1 capsule at 2:00pm;    Therapy: 24CRL9868 to  Requested for: 34YNB9218 Recorded   12  Vyvanse 60 MG Oral Capsule; take 1 capsule daily; Therapy: (Recorded:34Ate5170) to Recorded   13  Zaleplon 10 MG Oral Capsule; TAKE 1 TO 2 CAPSULES AT BEDTIME AS NEEDED; Last    Rx:83Uph3125 Ordered    Family Psych History  Son    1  Family history of attention deficit hyperactivity disorder (ADHD) (V17 0) (Z81 8)  Sister    2  Family history of Bipolar 1 disorder  Family History    3  Family history of Asbestosis   4  Family history of Cancer   5  Family history of Diabetes Mellitus (V18 0)   6  Family history of Emphysema   7  Family history of Glaucoma   8  Family history of Heart Disease (V17 49)   9  Family history of Hypertension (V17 49)   10  Family history of Mixed Hyperlipoproteinemia    Social History    · College graduate   ·    · Employed   · Never A Smoker   · No history of alcohol use    Future Appointments    Date/Time Provider Specialty Site   09/30/2016 10:15 AM GEORGETTE Belcher   Psychiatry [facility] PARTIAL HOSPITALIZATION     Signatures   Electronically signed by : FAYE Griffith; Sep 29 2016 11:11AM EST                       (Author)

## 2018-01-12 ENCOUNTER — ALLSCRIPTS OFFICE VISIT (OUTPATIENT)
Dept: OTHER | Facility: OTHER | Age: 43
End: 2018-01-12

## 2018-01-12 ENCOUNTER — GENERIC CONVERSION - ENCOUNTER (OUTPATIENT)
Dept: OTHER | Facility: OTHER | Age: 43
End: 2018-01-12

## 2018-01-12 DIAGNOSIS — Z13.6 ENCOUNTER FOR SCREENING FOR CARDIOVASCULAR DISORDERS: ICD-10-CM

## 2018-01-12 DIAGNOSIS — I10 ESSENTIAL (PRIMARY) HYPERTENSION: ICD-10-CM

## 2018-01-12 DIAGNOSIS — F90.0 ATTENTION-DEFICIT HYPERACTIVITY DISORDER, PREDOMINANTLY INATTENTIVE TYPE: ICD-10-CM

## 2018-01-12 DIAGNOSIS — F41.8 OTHER SPECIFIED ANXIETY DISORDERS: ICD-10-CM

## 2018-01-12 NOTE — PSYCH
History of Present Illness  [facility] Clinical Progress Note [facility]:   Specialized Services Documentation - Therapist must complete separate progress note for each specific clinical activity in which the client participated during the day  (548) Group Psychotherapy: ([phone]) Sacha Chaney participated actively in group focused on The process of changing and identification of stage of change  She verbalized her problems are avoiding people and isolation , panic attacks  she identify herself at the stage of awareness and determination to deal with issues  She offers spontaneous suggestions and feedback to peers  Some progress toward goals noted  Continue group to offer opportunity to make positive changes and manage anxiety  Sunitha Garcia RN  Treatment Plan Problem(s): 1 1,1 2      (969) Group Psychotherapy: [id number] Pt participated in wellness group focused on identifying wellness strategies to incorporate into WRAP  Neena shared that she finds "just watching my children play" is a coping strategy she uses even when she cannot get herself up to play with them  Pt discussed her weekend moving out of her home and into her own apt and involving her children in all aspects of this move  Pt made mild progress toward goal  Continue group to offer opportunities for pt to learn and practice individual wellness strategies to add to WRAP  Treatment Plan Problem(s): 1 1,1 2  Aspen Vásquez, RN     (676) Group Psychotherapy: [id number] John Dunn participated in psychotherapy group focused on identifying detrimental ways of coping as well as boundary setting  Neena talked about spending too much money over the holiday weekend, and that she feels that she either gets depressed when she shops and does not buy anything or spends more money than she has without thinking of the consequences  Neena seemed receptive to peer suggestions, and also offered support to her peers   Continue psychotherapy group to offer the opportunity to explore stressors and coping  Irene Deshpande, MSW, LSW  Treatment Plan Problem(s): 1 1, 1 2      (942) Education Therapy Goals set - getting her paperwork that she needs for her job    Treatment Plan Problem(s): 1 1  Education Therapy Time - 0900 - 0930 Previous goal was met  Readiness to Learning:  She is receptive to learning  There are  no barriers to learning  Learning Assessment Time - 1330 - 1400   Education completed on  illness and wellness tools  The teaching method was  verbal and written  Shared area of learning: Yes  Rex Siegel RN; Irene Deshpande, MSW, LSW  Case Management Note:   [phone] Celso Castro is requesting to have Ambien added to sleep and this request was discussed at [facility] team meeting this morning with Dr Юлия Burroughs who stated that she will not change pt from [facility] to Ambien  Pt was reminded to  her [place] prescription  Neena stated that she will bring in [facility]D paperwork from [facility] to be completed and was advised as to new policy paperwork completion charge  Pt stated that she is "confused" about paperwork that needs to be completed for STD and will speak with her employer to clarify  Neena discussed impulsivity over weekend buying many new things, she stated that she could not afford, for her new apt  since she only took three things from her home to the apt  Pt admits that she has poor impulse control with spending money, especially on unnecessary things  Pt discussed that she is focused on helping her former fiance with his medical care now that he will be attending chemo therapy again for a third treatment  Celso Castro was reminded to focus on personal goals while in Program to achieve mood stabilization  TREATMENT SESSION NUMBER: 6   Current suicide risk is low  Medications not changed/added/denied  Krystina Liu RN      Active Problems    1  ADHD, predominantly inattentive type (314 00) (F90 0)   2  Allergic rhinitis (477 9) (J30 9)   3   Anxiety and depression (300 00,311) (F41 9,F32 9)   4  Benign essential hypertension (401 1) (I10)   5  BMI 31 0-31 9,adult (V85 31) (Z68 31)   6  Cervicalgia (723 1) (M54 2)   7  Constipation (564 00) (K59 00)   8  Dysfunction of eustachian tube, unspecified laterality (381 81) (H69 80)   9  Encounter for screening mammogram for malignant neoplasm of breast (V76 12)   (Z12 31)   10  FORTINO (generalized anxiety disorder) (300 02) (F41 1)   11  Genital herpes simplex (054 10) (A60 00)   12  Headache (784 0) (R51)   13  Hip pain, unspecified laterality   14  Hypokalemia (276 8) (E87 6)   15  Lumbago (724 2) (M54 5)   16  Lyme disease (088 81) (A69 20)   17  Motion sickness (994 6) (T75 3XXA)   18  Obesity, Class I, BMI 30-34 9 (278 00) (E66 9)   19  Other muscle spasm (728 85) (M62 838)   20  Severe episode of recurrent major depressive disorder, without psychotic features    (296 33) (F33 2)   21  Tachycardia (785 0) (R00 0)   22  Thyroid disorder (246 9) (E07 9)   23  Well adult on routine health check (V70 0) (Z00 00)    Past Medical History    1  History of Acute bronchitis (466 0) (J20 9)   2  History of Acute pharyngitis (462) (J02 9)   3  History of Acute sinusitis (461 9) (J01 90)   4  History of Acute sinusitis (461 9) (J01 90)   5  History of Acute tonsillitis (463) (J03 90)   6  History of Acute upper respiratory infection (465 9) (J06 9)   7  History of Acute upper respiratory infection (465 9) (J06 9)   8  History of Acute UTI (599 0) (N39 0)   9  History of Cervicalgia (723 1) (M54 2)   10  History of acute pharyngitis (V12 69) (Z87 09)   11  History of migraine (V12 49) (Z86 69)   12  History of Nonallopathic lesion (739 9) (M99 9)   13  History of Palpitations (785 1) (R00 2)   14  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)   15  History of Shoulder joint pain, unspecified laterality   16  Urinary tract infection (599 0) (N39 0)    Allergies    1  Percocet TABS    Current Meds   1   Albuterol 90 MCG/ACT AERS; INHALE 1 TO 2 PUFFS EVERY 4 TO 6 HOURS AS   NEEDED; Therapy: (Recorded:55Div6095) to Recorded   2  BuPROPion HCl ER (XL) 300 MG Oral Tablet Extended Release 24 Hour; TAKE 1   TABLET Daily thity tablets; Therapy: 72UQW8327 to (Evaluate:23Jun2016)  Requested for: 55MTA5237; Last   Rx:[id number] Ordered   3  Griselda 0 35 MG Oral Tablet; TAKE 1 TABLET DAILY; Therapy: 08KTH5141 to Recorded   4  Flonase 50 MCG/ACT Nasal Suspension; USE 1 SPRAY IN EACH NOSTRIL ONCE   DAILY; Therapy: (Recorded:97Pgq3950) to Recorded   5  HydrOXYzine HCl - 25 MG Oral Tablet; TAKE 1 TO 2 TABLETS AT BEDTIME; Last   Rx:[id number] Ordered   6  Metoprolol Succinate ER 50 MG Oral Tablet Extended Release 24 Hour; TAKE 1 TABLET   DAILY; Therapy: 18WBZ0558 to (Evaluate:16Oct2016)  Requested for: 21EKE0594; Last   Rx:19Apr2016 Ordered   7  Multivitamins TABS; 1 Every Day; Therapy: 07INJ5183 to  Requested for: 74WSJ3713 Recorded   8  Pristiq 100 MG Oral Tablet Extended Release 24 Hour; Take 1 tablet daily; Therapy: 50JKO5173 to  Requested for: 42XGK1077 Recorded   9  Vyvanse 50 MG Oral Capsule; 1 capsule at 2:00pm;   Therapy: 03KYT4636 to  Requested for: 72FNR8449 Recorded   10  Vyvanse 60 MG Oral Capsule; take 1 capsule daily; Therapy: (Recorded:95Mzb3576) to Recorded   11  Zaleplon 10 MG Oral Capsule; TAKE 1 TO 2 CAPSULES AT BEDTIME AS NEEDED; Last    RE:33YFY8972 Ordered    Family Psych History  Son    1  Family history of attention deficit hyperactivity disorder (ADHD) (V17 0) (Z81 8)  Sister    2  Family history of Bipolar 1 disorder  Family History    3  Family history of Asbestosis   4  Family history of Cancer   5  Family history of Diabetes Mellitus (V18 0)   6  Family history of Emphysema   7  Family history of Glaucoma   8  Family history of Heart Disease (V17 49)   9  Family history of Hypertension (V17 49)   10   Family history of Mixed Hyperlipoproteinemia    Social History    · College graduate   ·    · Employed   · Never A Smoker   · No history of alcohol use    Future Appointments    Date/Time Provider Specialty Site   06/03/2016 10:30 AM Jensen Toussaint MD Psychiatry [facility]   06/01/2016 10:45 AM GEORGETTE Butler  Psychiatry [facility]   06/02/2016 10:15 AM GEORGETTE Butler   Psychiatry [facility]     Signatures   Electronically signed by : Janny Ray RN; May 31 2016  1:56PM EST                       (Author)    Electronically signed by : JONATHAN Rodriguez; May 31 2016  3:05PM EST                       (Author)    Electronically signed by : Manuel Pressley RN; May 31 2016  4:12PM EST                       (Author)    Electronically signed by : JONATHAN Rodriguez; May 31 2016  4:15PM EST                       (Author)    Electronically signed by : Manuel Pressley RN; Jun 1 2016  4:16PM EST                       (Author)

## 2018-01-12 NOTE — PSYCH
History of Present Illness  [facility] Clinical Progress Note [facility]:   Specialized Services Documentation - Therapist must complete separate progress note for each specific clinical activity in which the client participated during the day  (474) Group Psychotherapy: (9:30-10:30) Client attended group in which discussion revolved around life stressors such as relationship issues and self-care  Navid Ni participated in discussion, and offered support to her peers  Moderate progress toward goal  Continue group to offer opportunity to discuss stressors and relate to peers in a supportive and positive manner  HARSHAL Baig  Treatment Plan Problem(s): 1 0      (787) Group Psychotherapy: [id number] Neena participated in wellness group focusing on improving self-esteem and changing âHow You Feel About Yourself  â Pt shared that she does not think "I am that bright because I can't spell very well and I didn't pass the GRE to enter the PHD program at [address] [place] [address] " Peers reacted to patient's lack of self-esteem with surprise that she did not recognize how "bright" she is but that she is looking at the negatives rather than all the realistic accomplishments that she has that should increase her self-esteem  Pt stated that she does feel she has a "[place] of life and enjoys playing and interacting with her children" and this is a boost to her self-esteem  Pt made moderate progress toward goal  Continue group to educate patient on strategies to identify negative and positive views held about oneself and developing a truer picture before setting goals to work on enhancing self-esteem  Treatment Plan Problem(s): 1 1,1 2  Jose Lawton RN       (025) Education Therapy Goals set - start to challenge perception (positives out of negatives)    Treatment Plan Problem(s): 1 2  Education Therapy Time - 0900 - 0930 Previous goal was met  Readiness to Learning:  She is receptive to learning     There are  no barriers to learning  Learning Assessment Time - 1330 - 1400   Education completed on  illness, medication and wellness tools  The teaching method was  verbal  Shared area of learning: Yes  FAYE Pugh     (128) Allied Therapy 5231-7378 Neena actively shared in [facility] group focused on perception  Group explored development of thoughts based on perception and ways to make choices related to view we take of situations and self  Patient identified influences to her perception and a new understanding of changing her thoughts  She was open and able to apply concepts to herself stating that treatment is helping her start to view herself and illness âdifferentlyâ  Some beginning work toward goal noted  Continue AT to explore wellness strategies and personal role in reframing thoughts  Treatment Plan Problem(s): 1 1, 1 2  FAYE Pugh       Case Management Note:   [id number] Neena met with CM to discuss medications and her meeting with Dr Giselle Zuleta this AM  Pt stated she understands that she will be taking 300 mg Wellbutrin XR daily as ordered  Pt states Dr Giselle Zuleta informed her that written prescriptions will be given to CM for her  This CM gave Neena three written prescriptions: Wellbutrin HCL  mg tabs (30 tabs) Atarax 25 mg (60 tabs), Zaleplon 10 mgs (60 tabs) at 12:03PM  Pt lives in [place] and needs written RXs  Neena discussed LOS and asked if she could be extended in [facility] beyond initial four days approved by her insurance  CM explained review process with tomorrow being review day and Neena will be informed of insurance review decision  Pt stated that she feels she needs to be in Program and that it is very helpful so far for her  No SI, no HI, no report of AH or VH last evening or today  TREATMENT SESSION NUMBER: 3   Current suicide risk is low  Medications changed/added/denied:  Stas Hennessy, RN      Active Problems     1  Allergic rhinitis (627 9) (J30 9)   2   Benign essential hypertension (401 1) (I10)   3  BMI 31 0-31 9,adult (V85 31) (Z68 31)   4  Cervicalgia (723 1) (M54 2)   5  Constipation (564 00) (K59 00)   6  Dysfunction of eustachian tube, unspecified laterality (381 81) (H69 80)   7  Encounter for screening mammogram for malignant neoplasm of breast (V76 12)   (Z12 31)   8  Genital herpes simplex (054 10) (A60 00)   9  Headache (784 0) (R51)   10  Hip pain, unspecified laterality   11  Lumbago (724 2) (M54 5)   12  Lyme disease (088 81) (A69 20)   13  Motion sickness (994 6) (T75 3XXA)   14  Obesity, Class I, BMI 30-34 9 (278 00) (E66 9)   15  Other muscle spasm (728 85) (M62 838)   16  Tachycardia (785 0) (R00 0)   17  Thyroid disorder (246 9) (E07 9)   18  Well adult on routine health check (V70 0) (Z00 00)    Severe episode of recurrent major depressive disorder, without psychotic features (296 33) (F33 2)       FORTINO (generalized anxiety disorder) (300 02) (F41 1)       ADHD, predominantly inattentive type (314 00) (F90 0)          Past Medical History    1  History of Acute bronchitis (466 0) (J20 9)   2  History of Acute pharyngitis (462) (J02 9)   3  History of Acute sinusitis (461 9) (J01 90)   4  History of Acute sinusitis (461 9) (J01 90)   5  History of Acute tonsillitis (463) (J03 90)   6  History of Acute upper respiratory infection (465 9) (J06 9)   7  History of Acute upper respiratory infection (465 9) (J06 9)   8  History of Acute UTI (599 0) (N39 0)   9  History of Cervicalgia (723 1) (M54 2)   10  History of acute pharyngitis (V12 69) (Z87 09)   11  History of migraine (V12 49) (Z86 69)   12  History of Nonallopathic lesion (739 9) (M99 9)   13  History of Palpitations (785 1) (R00 2)   14  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)   15  History of Shoulder joint pain, unspecified laterality   16  Urinary tract infection (599 0) (N39 0)    Allergies    1  Percocet TABS    Current Meds   1   Albuterol 90 MCG/ACT AERS; INHALE 1 TO 2 PUFFS EVERY 4 TO 6 HOURS AS NEEDED; Therapy: (Recorded:93Oeg5401) to Recorded   2  ClonazePAM 1 MG Oral Tablet; Take 1 tablet twice daily; Therapy: 78VOQ6066 to  Requested for: 51DDJ5514 Recorded   3  Griselda 0 35 MG Oral Tablet; TAKE 1 TABLET DAILY; Therapy: 47OOK6639 to Recorded   4  Flonase 50 MCG/ACT Nasal Suspension; USE 1 SPRAY IN EACH NOSTRIL ONCE   DAILY; Therapy: (Recorded:20May2016) to Recorded   5  HydrOXYzine HCl - 25 MG Oral Tablet; TAKE 1 TO 2 TABLETS AT BEDTIME Recorded   6  Metoprolol Succinate ER 50 MG Oral Tablet Extended Release 24 Hour; TAKE 1 TABLET   DAILY; Therapy: 70JTA5871 to (Evaluate:16Oct2016)  Requested for: 19Apr2016; Last   Rx:19Apr2016 Ordered   7  Multivitamins TABS; 1 Every Day; Therapy: 23KLT5929 to  Requested for: 58EIP7365 Recorded   8  Pristiq 100 MG Oral Tablet Extended Release 24 Hour; Take 1 tablet daily; Therapy: 52XFP6663 to  Requested for: 45QUV7632 Recorded   9  Vyvanse 50 MG Oral Capsule; 1 capsule at 2:00pm;   Therapy: 05AWJ7401 to  Requested for: 60YBU8040 Recorded   10  Vyvanse 60 MG Oral Capsule; take 1 capsule daily; Therapy: (Recorded:20May2016) to Recorded   11  Wellbutrin  MG Oral Tablet Extended Release 24 Hour; Take 1 tablet daily; Therapy: (Recorded:64Jzc0449) to Recorded   12  Wellbutrin  MG Oral Tablet Extended Release 24 Hour; 1 Every Day; Therapy: 87QXS7830 to  Requested for: 88VGF5891 Recorded   13  Zaleplon 10 MG Oral Capsule; TAKE 1 CAPSULE AT BEDTIME; Therapy: (Recorded:56Siq7328) to Recorded    Family Psych History  Son    1  Family history of attention deficit hyperactivity disorder (ADHD) (V17 0) (Z81 8)  Sister    2  Family history of Bipolar 1 disorder  Family History    3  Family history of Asbestosis   4  Family history of Cancer   5  Family history of Diabetes Mellitus (V18 0)   6  Family history of Emphysema   7  Family history of Glaucoma   8  Family history of Heart Disease (V17 49)   9  Family history of Hypertension (V17 49)   10  Family history of Mixed Hyperlipoproteinemia    Social History    · College graduate   ·    · Employed   · Never A Smoker   · No history of alcohol use    Future Appointments    Date/Time Provider Specialty Site   05/25/2016 10:00 AM Sherwin Foster MD Psychiatry [facility]   05/24/2016 03:45 PM Charbel [address]   05/26/2016 10:00 AM GEORGETTE Kang  Psychiatry [facility]   05/27/2016 10:00 AM GEORGETTE Kang   Psychiatry [facility]     Signatures   Electronically signed by : James Tubbs, ; May 24 2016 11:09AM EST                       (Author)    Electronically signed by : Karla Ng RN; May 24 2016 12:15PM EST                       (Author)    Electronically signed by : FAYE Strong; May 24 2016  2:43PM EST                       (Author)    Electronically signed by : Karla Ng RN; May 24 2016  4:35PM EST                       (Author)

## 2018-01-12 NOTE — PSYCH
History of Present Illness  [facility] Clinical Progress Note [facility]:   Specialized Services Documentation - Therapist must complete separate progress note for each specific clinical activity in which the client participated during the day  [phone]) Group Psychotherapy: (9:30-10:30) Neena attended psychotherapy group dealing with themes of self-care and seeking support  Neena identified her children as a stressor  She discussed feeling guilt over not being with her children all the time, and also guilt over not missing them as much as she expected to  She expressed feeling isolated and depressed, and yet she is not as bothered by this as she would have expected  She seemed somewhat receptive to peer feedback about taking care of herself and adjusting to so much change in such a short period of time, although she seemed ambivalent about positive outcome  Some progress toward goals noted  Continue psychotherapy group to encourage patient to explore stressors and healthier ways of coping  Treatment Plan Problem(s): 1 1, 1 2  Lewis Henderson MSW, LSW     (962) Group Psychotherapy: [id number] Jeferson Ramos participated in wellness group focused on identifying what each patient's motivations are to recover from mental illness and how to increase positive motivation for successful recovery  Patient shared that she is motivated to get better so she can feel less depressed and anxious  Neena related that after her divorce she was motivated to go to the beach by herself and stay overnight at a [employer] just to show herself she could do things independently  Pt stated that she feels proud of herself as she achieved this goal  Peers discussed overcoming fears with strong motivations such as "proving to yourself you can do something " Pt made good progress toward goals   Continue group to assist patient in understanding their individual motivation for recovery and to recognize which supports they will identify to share individual motivations and goals for recovery with  Treatment Plan Problem(s): 1 1,1 2  Jami Chowdhury RN       (808) Education Therapy Goals set - use positive self-talk    Treatment Plan Problem(s): 1 1, 1 2  Education Therapy Time - 0900 - 0930 Previous goal was met  Readiness to Learning:  She is receptive to learning  There are  no barriers to learning  Learning Assessment Time - 1330 - 1400   Education completed on  illness and wellness tools  The teaching method was  verbal  Shared area of learning: Yes  Weston Layton MSW, LSW     [phone] Riky Messina actively shared in [facility] group focused on challenging cognitive distortions and relaxation exercises  Fully engaged in therapist led relaxation exercises offering feedback related to benefits  She expressed struggling with âemotional reasoning and disqualifying the positivesâ and found learning the different types of cognitive distortions to be very helpful  Group practiced CBT technique to challenge and change thoughts  Group reinforced practice of skill  Some positive effort noted toward goal  Continue AT to increase awareness and use of wellness tools  Treatment Plan Problem(s): 1 5  James Cornell [facility]       Case Management Note:   15/[facility]#4 [id number] Neena met with this CM to discuss D/C planning  Patient will be discharged on Friday, 6/24/16 and was given Relapse Prevention Plan to complete and bring in on Friday, her next Program day  Patient worked with CM in reviewing treatment goals and showed CM her schedule/routine she has designed to structure her days once she is D/C'd from [address] [place] [address] Rd also discussed her ongoing feelings of guilt that she does not "miss my children more" since I moved out on my own  Patient related to feelings of guilt she experienced postpartum with the birth of her daughter   She stated that she did not "bond" immediately with her daughter and this brought feelings of disappointment in herself as her expectations of the initial "mothering" experience were not met  Neena also stated she felt guilt then, as she does feel now, although she loves her children she stated that she is not "missing them like I am thinking I should " Patient is working on [employer] and the remainder of WRAP (Crisis planning) was reviewed, page by page, at patient's request, with   TREATMENT SESSION NUMBER: 15   Current suicide risk is low  Medications not changed/added/denied  Carlita Escobar RN      Active Problems    1  ADHD, predominantly inattentive type (314 00) (F90 0)   2  Allergic rhinitis (477 9) (J30 9)   3  Anxiety and depression (300 00,311) (F41 9,F32 9)   4  Benign essential hypertension (401 1) (I10)   5  BMI 31 0-31 9,adult (V85 31) (Z68 31)   6  Cervicalgia (723 1) (M54 2)   7  Constipation (564 00) (K59 00)   8  Dysfunction of eustachian tube, unspecified laterality (381 81) (H69 80)   9  Encounter for screening mammogram for malignant neoplasm of breast (V76 12)   (Z12 31)   10  FORTINO (generalized anxiety disorder) (300 02) (F41 1)   11  Genital herpes simplex (054 10) (A60 00)   12  Headache (784 0) (R51)   13  Hip pain, unspecified laterality   14  Hypokalemia (276 8) (E87 6)   15  Lumbago (724 2) (M54 5)   16  Lyme disease (088 81) (A69 20)   17  Motion sickness (994 6) (T75 3XXA)   18  Obesity, Class I, BMI 30-34 9 (278 00) (E66 9)   19  Other muscle spasm (728 85) (M62 838)   20  Severe episode of recurrent major depressive disorder, without psychotic features    (296 33) (F33 2)   21  Tachycardia (785 0) (R00 0)   22  Thyroid disorder (246 9) (E07 9)   23  Well adult on routine health check (V70 0) (Z00 00)    Past Medical History    1  History of Acute bronchitis (466 0) (J20 9)   2  History of Acute pharyngitis (462) (J02 9)   3  History of Acute sinusitis (461 9) (J01 90)   4  History of Acute sinusitis (461 9) (J01 90)   5  History of Acute tonsillitis (463) (J03 90)   6   History of Acute upper respiratory infection (465 9) (J06 9)   7  History of Acute upper respiratory infection (465 9) (J06 9)   8  History of Acute UTI (599 0) (N39 0)   9  History of Cervicalgia (723 1) (M54 2)   10  History of acute pharyngitis (V12 69) (Z87 09)   11  History of migraine (V12 49) (Z86 69)   12  History of Nonallopathic lesion (739 9) (M99 9)   13  History of Palpitations (785 1) (R00 2)   14  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)   15  History of Shoulder joint pain, unspecified laterality   16  Urinary tract infection (599 0) (N39 0)    Allergies    1  Percocet TABS    Current Meds   1  Albuterol 90 MCG/ACT AERS; INHALE 1 TO 2 PUFFS EVERY 4 TO 6 HOURS AS   NEEDED; Therapy: (Recorded:20May2016) to Recorded   2  BuPROPion HCl ER (XL) 300 MG Oral Tablet Extended Release 24 Hour; TAKE 1   TABLET Daily thity tablets; Therapy: 43FUM8707 to (Evaluate:23Jun2016)  Requested for: 47XIJ4950; Last   Rx:24May2016 Ordered   3  ClonazePAM 1 MG Oral Tablet; TAKE 1 TABLET AT BEDTIME; Therapy: 28WSK6560 to (Evaluate:83Uzc5908); Last Rx:10Jun2016 Ordered   4  Griselda 0 35 MG Oral Tablet; TAKE 1 TABLET DAILY; Therapy: 83IDL1664 to Recorded   5  Flonase 50 MCG/ACT SUSP; USE 1 SPRAY IN EACH NOSTRIL ONCE DAILY; Therapy: (Recorded:20May2016) to Recorded   6  HydrOXYzine HCl - 25 MG Oral Tablet; TAKE 1 TO 2 TABLETS AT BEDTIME; Last   Rx:82Gjl2526 Ordered   7  Metoprolol Succinate ER 50 MG Oral Tablet Extended Release 24 Hour; TAKE 1 TABLET   DAILY; Therapy: 83CGE9564 to (Evaluate:16Oct2016)  Requested for: 47KLW5097; Last   Rx:19Apr2016 Ordered   8  Multivitamins TABS; 1 Every Day; Therapy: 12URO7522 to  Requested for: 98JRX0641 Recorded   9  Pristiq 100 MG Oral Tablet Extended Release 24 Hour; Take 1 tablet daily; Therapy: 37DRJ5658 to  Requested for: 86XLJ4877 Recorded   10  Vyvanse 50 MG Oral Capsule; 1 capsule at 2:00pm;    Therapy: 30MHR8979 to  Requested for: 49RAK1836 Recorded   11  Vyvanse 60 MG Oral Capsule; take 1 capsule daily;     Therapy: (Recorded:34Vwm6920) to Recorded   12  Zaleplon 10 MG Oral Capsule; TAKE 1 TO 2 CAPSULES AT BEDTIME AS NEEDED; Last    Rx:63Del2764 Ordered    Family Psych History  Son    1  Family history of attention deficit hyperactivity disorder (ADHD) (V17 0) (Z81 8)  Sister    2  Family history of Bipolar 1 disorder  Family History    3  Family history of Asbestosis   4  Family history of Cancer   5  Family history of Diabetes Mellitus (V18 0)   6  Family history of Emphysema   7  Family history of Glaucoma   8  Family history of Heart Disease (V17 49)   9  Family history of Hypertension (V17 49)   10  Family history of Mixed Hyperlipoproteinemia    Social History    · College graduate   ·    · Employed   · Never A Smoker   · No history of alcohol use    Future Appointments    Date/Time Provider Specialty Site   06/24/2016 10:15 AM GEORGETTE Chen   [facility]   07/14/2016 11:00 AM Divya Hankins [place]     Signatures   Electronically signed by : JONATHAN Rodriguez; Jun 22 2016  1:09PM EST                       (Author)    Electronically signed by : FAYE Valles; Jun 22 2016  1:26PM EST                       (Author)    Electronically signed by : JONATHAN Rodriguez; Jun 22 2016  1:58PM EST                       (Author)    Electronically signed by : Manuel Pressley RN; Jun 22 2016  3:35PM EST                       (Author)    Electronically signed by : Manuel Pressley RN; Jun 22 2016  3:44PM EST                       (Author)

## 2018-01-12 NOTE — PSYCH
History of Present Illness  [facility] Progress [facility]:   Specialized Services Documentation - Therapist must complete separate progress note for each specific clinical activity in which the client participated during the day  (655) Group Psychotherapy: (9:30-10:30) Neena participated in psychotherapy group focused on finding healthy supports and mental health stigma  Neena identified not being at school to start off the school year as a stressor  She also discussed spending time with her children over the weekend, and trying to encourage them to make healthy choices for themselves, while still trying to protect them as a mother  Peers provided her with feedback about ways to relate to her children in ways that will encourage open communication  Navid Ni was actively engaged in group discussion, offering support and feedback to peers throughout  Some moderate progress noted toward goals today  Continue psychotherapy group to encourage Neena to explore stressors and coping  Treatment Plan Problem(s): 1 1, 1 2  Madonna Nageotte MSW, LSW     (159) Group Psychotherapy: 8825-8463 Neena participated in wellness group focused on working to increase and improve healthy motivation in recovery  Neena shared that 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 but 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 do this  When questioned by peers, she could not identify what she wanted to do aside from teaching  She was just able to say that she does not want to teach any longer  Neena did discuss creating and nurturing motivation by following a schedule or routine is still very helpful for her  Navid Flemingbill made slow progress toward goal  Continue group to educate Neena on importance of nurturing healthy motivation in recovery  Treatment Plan Problem(s): 1 1,1 2  Jose Lawton RN       (973) Education Therapy Goals set - separate laundry    Treatment Plan Problem(s): 1 1, 1 2     Education Therapy Time - 0900 - 0930 Previous goal was met  Readiness to Learning:  She is receptive to learning  There are  no barriers to learning  Learning Assessment Time - 1330 - 1400   Education completed on  illness and wellness tools  The teaching method was  verbal  Shared area of learning: Yes  Ernrique Forte MSW, LSW     [phone] Riky Messina actively shared in [facility] group focused on transforming self- stigma to acceptance  She engaged in mary jo analysis and discussion focused on sections of video related to stigma  She identified struggle with feeling good about herself, however as she listed 5 aspects of herself she was able to identify âprideâ in at least 2 of them  Group explored value in increasing comfort with self and strategies to begin to do so  She identified ongoing fears of opening up about her depression with people in her life - particularly with work situations  The risks and benefits of self-disclosure and value of âsafe supportsâ explored  Some progress toward goal noted  Continue AT to encourage self -awareness and impact of self-talk/self-care on wellness  Treatment Plan Problem(s): 1 1, 1 4  FAYE Lyons     ( ) Other [phone] with Rita Nolasco at [facility] [phone] - [address] [facility] authorized starting 9/13/16 through 9/30/16 - 9 sessions #[id number]  Follow up phone is [phone]  FAYE Lyons     Case Management Note:   [id number] Met with Neena  Discussed weekend progress  She stated it was stressful with her children bickering and she remains worried about them transitioning back to school and she is not there  Reviewed options to be present through messages/text on a daily basis  She continues to struggle with eating and again problem solving to make it easy and successful  Treatment plan updated - she identified the importance of addressing return to work and her "self-image" issues  Reviewed the importance of remaining consistent with 1 1 and effort needed toward 1 4   Updated plan printed for her to received copy  1339 Updated tx plan signed and UR outcome shared with Neena  TREATMENT SESSION NUMBER: 7   Current suicide risk is low  Medications not changed/added/denied  FAYE Krueger      Active Problems    1  ADHD, predominantly inattentive type (314 00) (F90 0)   2  Allergic rhinitis (477 9) (J30 9)   3  Anxiety and depression (300 00,311) (F41 9,F32 9)   4  Benign essential hypertension (401 1) (I10)   5  BMI 31 0-31 9,adult (V85 31) (Z68 31)   6  Cervicalgia (723 1) (M54 2)   7  Constipation (564 00) (K59 00)   8  Dysfunction of eustachian tube, unspecified laterality (381 81) (H69 80)   9  Encounter for screening mammogram for malignant neoplasm of breast (V76 12)   (Z12 31)   10  FORTINO (generalized anxiety disorder) (300 02) (F41 1)   11  Genital herpes simplex (054 10) (A60 00)   12  Headache (784 0) (R51)   13  Hip pain, unspecified laterality   14  Hypokalemia (276 8) (E87 6)   15  Lumbago (724 2) (M54 5)   16  Lyme disease (088 81) (A69 20)   17  Motion sickness (994 6) (T75 3XXA)   18  Obesity, Class I, BMI 30-34 9 (278 00) (E66 9)   19  Other muscle spasm (728 85) (M62 838)   20  Severe episode of recurrent major depressive disorder, without psychotic features    (296 33) (F33 2)   21  Tachycardia (785 0) (R00 0)   22  Thyroid disorder (246 9) (E07 9)   23  Well adult on routine health check (V70 0) (Z00 00)    Past Medical History    1  History of Acute bronchitis (466 0) (J20 9)   2  History of Acute pharyngitis (462) (J02 9)   3  History of Acute sinusitis (461 9) (J01 90)   4  History of Acute sinusitis (461 9) (J01 90)   5  History of Acute tonsillitis (463) (J03 90)   6  History of Acute upper respiratory infection (465 9) (J06 9)   7  History of Acute upper respiratory infection (465 9) (J06 9)   8  History of Acute UTI (599 0) (N39 0)   9  History of Cervicalgia (723 1) (M54 2)   10  Denied: History of Head trauma   11   History of acute pharyngitis (V12 69) (Z87 09)   12  History of migraine (V12 49) (Z86 69)   13  History of Nonallopathic lesion (739 9) (M99 9)   14  History of Palpitations (785 1) (R00 2)   15  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)   16  Denied: History of Seizure   17  History of Shoulder joint pain, unspecified laterality   18  Urinary tract infection (599 0) (N39 0)    Allergies    1  Percocet TABS    Current Meds   1  Albuterol 90 MCG/ACT AERS; INHALE 1 TO 2 PUFFS EVERY 4 TO 6 HOURS AS   NEEDED; Therapy: (Recorded:59Geq7893) to Recorded   2  BuPROPion HCl ER (XL) 300 MG Oral Tablet Extended Release 24 Hour; TAKE 1   TABLET Daily thity tablets; Therapy: 72STP0599 to (Evaluate:23Jun2016)  Requested for: 98VZG7120; Last   Rx:24May2016 Ordered   3  CloNIDine HCl - 0 1 MG Oral Tablet; Take one tablet PO  AT 6:00 PM and 1 tablet PO AT   9:00 pm;   Therapy: (Recorded:45Ced8290) to Recorded   4  Griselda 0 35 MG Oral Tablet; TAKE 1 TABLET DAILY; Therapy: 52DIY5949 to Recorded   5  Flonase 50 MCG/ACT SUSP; USE 1 SPRAY IN EACH NOSTRIL ONCE DAILY; Therapy: (Recorded:10Pzd0643) to Recorded   6  Gabapentin 300 MG Oral Capsule; TAKE 3 CAPSULE Bedtime; Therapy: (Recorded:15Uzl9658) to Recorded   7  HydrOXYzine HCl - 25 MG Oral Tablet; TAKE 1 TO 2 TABLETS AT BEDTIME; Last   Rx:72Jlz0898 Ordered   8  Metoprolol Succinate ER 50 MG Oral Tablet Extended Release 24 Hour; TAKE 1 TABLET   DAILY; Therapy: 66HHC7523 to (Evaluate:16Oct2016)  Requested for: 50LZI8323; Last   Rx:19Apr2016 Ordered   9  Multivitamins TABS; 1 Every Day; Therapy: 64GAB5573 to  Requested for: 42ICK8223 Recorded   10  Pristiq 100 MG Oral Tablet Extended Release 24 Hour; Take 1 tablet daily; Therapy: 81TJU4456 to  Requested for: 98FLW4389 Recorded   11  Vyvanse 50 MG Oral Capsule; 1 capsule at 2:00pm;    Therapy: 95KCY3971 to  Requested for: 27PCB4597 Recorded   12  Vyvanse 60 MG Oral Capsule; take 1 capsule daily; Therapy: (Recorded:30Etk7103) to Recorded   13   Zaleplon 10 MG Oral Capsule; TAKE 1 TO 2 CAPSULES AT BEDTIME AS NEEDED; Last    NK:99PUE7134 Ordered    Family Psych History  Son    1  Family history of attention deficit hyperactivity disorder (ADHD) (V17 0) (Z81 8)  Sister    2  Family history of Bipolar 1 disorder  Family History    3  Family history of Asbestosis   4  Family history of Cancer   5  Family history of Diabetes Mellitus (V18 0)   6  Family history of Emphysema   7  Family history of Glaucoma   8  Family history of Heart Disease (V17 49)   9  Family history of Hypertension (V17 49)   10  Family history of Mixed Hyperlipoproteinemia    Social History    · College graduate   ·    · Employed   · Never A Smoker   · No history of alcohol use    Future Appointments    Date/Time Provider Specialty Site   09/13/2016 11:00 AM GEORGETTE Red  [facility] H[facility]ITAL[facility]   09/14/2016 10:15 AM GEORGETTE Red  Psychiatry [facility]   09/15/2016 10:45 AM GEORGETTE Red  Psychiatry [facility]   09/16/2016 10:30 AM GEORGETTE Red   Psychiatry [facility]   09/20/2016 11:00 AM Jordan Hankins [address] [facility]     Signatures   Electronically signed by : JONATHAN Mcdonough; Sep 12 2016  1:12PM EST                       (Author)    Electronically signed by : JONATHAN Mcdonough; Sep 12 2016  2:31PM EST                       (Author)    Electronically signed by : FAYE Champagne; Sep 12 2016  3:12PM EST                       (Author)    Electronically signed by : Maggie Potter RN; Sep 12 2016  3:18PM EST                       (Author)

## 2018-01-12 NOTE — PSYCH
Message  Message Free Text Note Form: spoke to pt on 8/17 to confirm intake on 8/18-looking forward to it! Active Problems    1  ADHD, predominantly inattentive type (314 00) (F90 0)   2  Allergic rhinitis (477 9) (J30 9)   3  Anxiety and depression (300 00,311) (F41 9,F32 9)   4  Benign essential hypertension (401 1) (I10)   5  BMI 31 0-31 9,adult (V85 31) (Z68 31)   6  Cervicalgia (723 1) (M54 2)   7  Constipation (564 00) (K59 00)   8  Dysfunction of eustachian tube, unspecified laterality (381 81) (H69 80)   9  Encounter for screening mammogram for malignant neoplasm of breast (V76 12)   (Z12 31)   10  FORTINO (generalized anxiety disorder) (300 02) (F41 1)   11  Genital herpes simplex (054 10) (A60 00)   12  Headache (784 0) (R51)   13  Hip pain, unspecified laterality   14  Hypokalemia (276 8) (E87 6)   15  Lumbago (724 2) (M54 5)   16  Lyme disease (088 81) (A69 20)   17  Motion sickness (994 6) (T75 3XXA)   18  Obesity, Class I, BMI 30-34 9 (278 00) (E66 9)   19  Other muscle spasm (728 85) (M62 838)   20  Severe episode of recurrent major depressive disorder, without psychotic features    (296 33) (F33 2)   21  Tachycardia (785 0) (R00 0)   22  Thyroid disorder (246 9) (E07 9)   23  Well adult on routine health check (V70 0) (Z00 00)    Current Meds   1  Albuterol 90 MCG/ACT AERS; INHALE 1 TO 2 PUFFS EVERY 4 TO 6 HOURS AS   NEEDED; Therapy: (Recorded:10Ghp1291) to Recorded   2  BuPROPion HCl ER (XL) 300 MG Oral Tablet Extended Release 24 Hour (Wellbutrin XL);   TAKE 1 TABLET Daily thity tablets; Therapy: 02EUR8429 to (Evaluate:23Jun2016)  Requested for: 23VEU3012; Last   Rx:90Bld1827 Ordered   3  ClonazePAM 1 MG Oral Tablet; TAKE 1 TABLET AT BEDTIME; Therapy: 55OOC8449 to (Evaluate:24Goa5798); Last Rx:10Jun2016 Ordered   4  Griselda 0 35 MG Oral Tablet; TAKE 1 TABLET DAILY; Therapy: 55JTK5395 to Recorded   5   Flonase 50 MCG/ACT SUSP (Fluticasone Propionate); USE 1 SPRAY IN EACH NOSTRIL   ONCE DAILY; Therapy: (Recorded:06Xpl7010) to Recorded   6  HydrOXYzine HCl - 25 MG Oral Tablet; TAKE 1 TO 2 TABLETS AT BEDTIME; Last   Rx:20Byo7440 Ordered   7  Metoprolol Succinate ER 50 MG Oral Tablet Extended Release 24 Hour; TAKE 1 TABLET   DAILY; Therapy: 01MKX1383 to (Evaluate:11Wxt4188)  Requested for: 70JFN2417; Last   Rx:06Rou8314 Ordered   8  Multivitamins TABS; 1 Every Day; Therapy: 98IEO5023 to  Requested for: 66XMT7992 Recorded   9  Pristiq 100 MG Oral Tablet Extended Release 24 Hour; Take 1 tablet daily; Therapy: 18ZCG7618 to  Requested for: 48PMX3956 Recorded   10  Vyvanse 50 MG Oral Capsule; 1 capsule at 2:00pm;    Therapy: 76AHD4769 to  Requested for: 86WSR5807 Recorded   11  Vyvanse 60 MG Oral Capsule; take 1 capsule daily; Therapy: (Recorded:15Ghe0663) to Recorded   12  Zaleplon 10 MG Oral Capsule; TAKE 1 TO 2 CAPSULES AT BEDTIME AS NEEDED; Last    Rx:70Fkg0489 Ordered    Allergies    1   Percocet TABS    Signatures   Electronically signed by : Eliz Jackson LCSW; Aug 18 2016 11:09AM EST                       (Author)

## 2018-01-12 NOTE — PSYCH
History of Present Illness  [facility] Clinical Progress Note [facility]:   Specialized Services Documentation - Therapist must complete separate progress note for each specific clinical activity in which the client participated during the day  (324) Group Psychotherapy: 3205-0113 Group Psychotherapy : Patient excused due to psychiatric evaluation  Author FAYE Monterroso     (504) Group Psychotherapy: 5901-0135 Wendie Aponte participated in wellness group focused on assessment of weekly goal work in each area of functioning; including physical, emotional, cognitive, social and spiritual  Pt shared that she will focus on taking better care of herself physically and either walking every day or exercising in her home  She shared with peers information on several DVD's she has to exercise in her home when weather is not good and she stated she likes to "dance" with her daughter to these videos  Pt made mild beginning progress toward goal  Continue group to educate and encourage patient to identify areas of functioning requiring ongoing attention for healthier functioning  Treatment Plan Problem(s): 1 1,1 2  Kassi Phan RN       (909) Education Therapy Goals set - do something creative    Treatment Plan Problem(s): 1 1  Education Therapy Time - 0900 - 0930, Time first treatment day   Readiness to Learning:  She is receptive to learning  There are  no barriers to learning  Learning Assessment Time - 1330 - 1400   Education completed on  illness, medication and wellness tools  The teaching method was  verbal  Shared area of learning: Yes  Author FAYE Monterroso     [phone] Riky Messina was excused related to case management evaluation  Author FAYE Monterroso       Case Management Note:   [id number] Neena met with CM to complete Initial Evaluation and sign ROIs  Pt stated that she will return to see Dr Diya Wang post D/C and has a F/U appointment already scheduled   Pt does not have an OP therapist but will go to her insurance website and contact approved OP therapists to contact to make OP appointment for therapy post [facility] D/C  Neena was given crisis information and contact information for [facility] and this CM  TREATMENT SESSION NUMBER: 1   Current suicide risk is low  Medications not changed/added/denied  Silviano Lama RN      Active Problems    1  Allergic rhinitis (477 9) (J30 9)   2  Benign essential hypertension (401 1) (I10)   3  BMI 31 0-31 9,adult (V85 31) (Z68 31)   4  Cervicalgia (723 1) (M54 2)   5  Constipation (564 00) (K59 00)   6  Dysfunction of eustachian tube, unspecified laterality (381 81) (H69 80)   7  Encounter for screening mammogram for malignant neoplasm of breast (V76 12)   (Z12 31)   8  Genital herpes simplex (054 10) (A60 00)   9  Headache (784 0) (R51)   10  Hip pain, unspecified laterality   11  Lumbago (724 2) (M54 5)   12  Lyme disease (088 81) (A69 20)   13  Motion sickness (994 6) (T75 3XXA)   14  Obesity, Class I, BMI 30-34 9 (278 00) (E66 9)   15  Other muscle spasm (728 85) (M62 838)   16  Tachycardia (785 0) (R00 0)   17  Thyroid disorder (246 9) (E07 9)   18  Well adult on routine health check (V70 0) (Z00 00)    Past Medical History    1  History of Acute bronchitis (466 0) (J20 9)   2  History of Acute pharyngitis (462) (J02 9)   3  History of Acute sinusitis (461 9) (J01 90)   4  History of Acute sinusitis (461 9) (J01 90)   5  History of Acute tonsillitis (463) (J03 90)   6  History of Acute upper respiratory infection (465 9) (J06 9)   7  History of Acute upper respiratory infection (465 9) (J06 9)   8  History of Acute UTI (599 0) (N39 0)   9  History of Cervicalgia (723 1) (M54 2)   10  History of acute pharyngitis (V12 69) (Z87 09)   11  History of migraine (V12 49) (Z86 69)   12  History of Nonallopathic lesion (739 9) (M99 9)   13  History of Palpitations (785 1) (R00 2)   14  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)   15   History of Shoulder joint pain, unspecified laterality   16  Urinary tract infection (599 0) (N39 0)    Allergies    1  Percocet TABS    Current Meds   1  Griselda 0 35 MG Oral Tablet; TAKE 1 TABLET DAILY; Therapy: 87LDN2028 to Recorded   2  HydrOXYzine HCl - 25 MG Oral Tablet; TAKE 1 TO 2 TABLETS AT BEDTIME Recorded   3  KlonoPIN 0 5 MG Oral Tablet; 1/2 tab to 1 tab prn; Therapy: 23BXT0262 to  Requested for: 84LNJ4487 Recorded   4  Metoprolol Succinate ER 50 MG Oral Tablet Extended Release 24 Hour; TAKE 1 TABLET   DAILY; Therapy: 22REB2401 to (Evaluate:16Oct2016)  Requested for: 79Iyr3683; Last   Rx:21Pty0370 Ordered   5  Multivitamins TABS; 1 Every Day; Therapy: 70JFQ4256 to  Requested for: 22JSR5651 Recorded   6  Pristiq 50 MG Oral Tablet Extended Release 24 Hour; 1 Every Day; Therapy: 37DNX8390 to  Requested for: 40XSU0901 Recorded   7  Vyvanse 50 MG Oral Capsule; 1 Two Times A Day; Therapy: 84YIC3046 to  Requested for: 95PAA2521 Recorded   8  Wellbutrin  MG Oral Tablet Extended Release 24 Hour; 1 Every Day; Therapy: 29KZQ6024 to  Requested for: 26SIG9379 Recorded    Family Psych History  Family History    1  Family history of Asbestosis   2  Family history of Cancer   3  Family history of Diabetes Mellitus (V18 0)   4  Family history of Emphysema   5  Family history of Glaucoma   6  Family history of Heart Disease (V17 49)   7  Family history of Hypertension (V17 49)   8   Family history of Mixed Hyperlipoproteinemia    Social History    · Never A Smoker    Future Appointments    Date/Time Provider Specialty Site   05/24/2016 03:45 PM Shawna Salcido[address]     Signatures   Electronically signed by : FAYE Griffith; May 20 2016  2:03PM EST                       (Author)    Electronically signed by : Lobo Gamble RN; May 20 2016  3:41PM EST                       (Author)    Electronically signed by : Lobo Gamble RN; May 20 2016  4:22PM EST (Author)

## 2018-01-13 VITALS
HEIGHT: 59 IN | SYSTOLIC BLOOD PRESSURE: 120 MMHG | BODY MASS INDEX: 31.65 KG/M2 | DIASTOLIC BLOOD PRESSURE: 82 MMHG | HEART RATE: 76 BPM | WEIGHT: 157 LBS | RESPIRATION RATE: 18 BRPM | TEMPERATURE: 98.2 F

## 2018-01-13 NOTE — PSYCH
History of Present Illness  [facility] Progress [facility]:   Specialized Services Documentation - Therapist must complete separate progress note for each specific clinical activity in which the client participated during the day  Case Management Note: Individual Case Management visit not provided today  6712 Received call from [facility] that she did not sleep well and did not feel comfortable driving in  Spoke with her  She took a Pepsid last night in addition to some changes made by her OP psychiatrist yesterday and did not fall asleep until 3am  Asked her to bring in changes, relapse prevention plan for tomorrow as it is her anticipated discharge date  Medications changed/added/denied: OP psychiatrist made changes - she will bring in tomorrow [facility]      Active Problems    1  ADHD, predominantly inattentive type (314 00) (F90 0)   2  Allergic rhinitis (477 9) (J30 9)   3  Anxiety and depression (300 00,311) (F41 9,F32 9)   4  Benign essential hypertension (401 1) (I10)   5  BMI 31 0-31 9,adult (V85 31) (Z68 31)   6  Cervicalgia (723 1) (M54 2)   7  Constipation (564 00) (K59 00)   8  Dysfunction of eustachian tube, unspecified laterality (381 81) (H69 80)   9  Encounter for screening mammogram for malignant neoplasm of breast (V76 12)   (Z12 31)   10  FORTINO (generalized anxiety disorder) (300 02) (F41 1)   11  Genital herpes simplex (054 10) (A60 00)   12  Headache (784 0) (R51)   13  Hip pain, unspecified laterality   14  Hypokalemia (276 8) (E87 6)   15  Lumbago (724 2) (M54 5)   16  Lyme disease (088 81) (A69 20)   17  Motion sickness (994 6) (T75 3XXA)   18  Obesity, Class I, BMI 30-34 9 (278 00) (E66 9)   19  Other muscle spasm (728 85) (M62 838)   20  Severe episode of recurrent major depressive disorder, without psychotic features    (296 33) (F33 2)   21  Tachycardia (785 0) (R00 0)   22  Thyroid disorder (246 9) (E07 9)   23   Well adult on routine health check (V70 0) (Z00 00)    Past Medical History    1  History of Acute bronchitis (466 0) (J20 9)   2  History of Acute pharyngitis (462) (J02 9)   3  History of Acute sinusitis (461 9) (J01 90)   4  History of Acute sinusitis (461 9) (J01 90)   5  History of Acute tonsillitis (463) (J03 90)   6  History of Acute upper respiratory infection (465 9) (J06 9)   7  History of Acute upper respiratory infection (465 9) (J06 9)   8  History of Acute UTI (599 0) (N39 0)   9  History of Cervicalgia (723 1) (M54 2)   10  Denied: History of Head trauma   11  History of acute pharyngitis (V12 69) (Z87 09)   12  History of migraine (V12 49) (Z86 69)   13  History of Nonallopathic lesion (739 9) (M99 9)   14  History of Palpitations (785 1) (R00 2)   15  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)   16  Denied: History of Seizure   17  History of Shoulder joint pain, unspecified laterality   18  Urinary tract infection (599 0) (N39 0)    Allergies    1  Percocet TABS    Current Meds   1  Albuterol 90 MCG/ACT AERS; INHALE 1 TO 2 PUFFS EVERY 4 TO 6 HOURS AS   NEEDED; Therapy: (Recorded:32Env9382) to Recorded   2  BuPROPion HCl ER (XL) 300 MG Oral Tablet Extended Release 24 Hour; TAKE 1   TABLET Daily thity tablets; Therapy: 02WNZ1236 to (Evaluate:23Jun2016)  Requested for: 70HCD2570; Last   Rx:24May2016 Ordered   3  CloNIDine HCl - 0 1 MG Oral Tablet; Take one tablet PO  AT 6:00 PM and 1 tablet PO AT   9:00 pm;   Therapy: (Recorded:79Yez2203) to Recorded   4  Griselda 0 35 MG Oral Tablet; TAKE 1 TABLET DAILY; Therapy: 69LNZ5998 to Recorded   5  Flonase 50 MCG/ACT SUSP; USE 1 SPRAY IN EACH NOSTRIL ONCE DAILY; Therapy: (Recorded:41Yan9785) to Recorded   6  Gabapentin 300 MG Oral Capsule; TAKE 3 CAPSULE Bedtime; Last Rx:16Sep2016   Ordered   7  HydrOXYzine HCl - 50 MG Oral Tablet; TAKE 2 TABLET Bedtime; Last Rx:25Zeu4672   Ordered   8  Metoprolol Succinate ER 50 MG Oral Tablet Extended Release 24 Hour; TAKE 1 TABLET   DAILY;    Therapy: 58IIN9185 to (Evaluate:62Ded3149)  Requested for: 30QEX4935; Last   Rx:45Dbt4170 Ordered   9  Multivitamins TABS; 1 Every Day; Therapy: 74JIG6539 to  Requested for: 85BPE5354 Recorded   10  Pristiq 100 MG Oral Tablet Extended Release 24 Hour; Take 1 tablet daily; Therapy: 14PRC2328 to  Requested for: 59KTB9978 Recorded   11  Vyvanse 50 MG Oral Capsule; 1 capsule at 2:00pm;    Therapy: 66TYC9096 to  Requested for: 47WPC9730 Recorded   12  Vyvanse 60 MG Oral Capsule; take 1 capsule daily; Therapy: (Recorded:25Ucv4595) to Recorded   13  Zaleplon 10 MG Oral Capsule; TAKE 1 TO 2 CAPSULES AT BEDTIME AS NEEDED; Last    Rx:14Sii6695 Ordered    Family Psych History  Son    1  Family history of attention deficit hyperactivity disorder (ADHD) (V17 0) (Z81 8)  Sister    2  Family history of Bipolar 1 disorder  Family History    3  Family history of Asbestosis   4  Family history of Cancer   5  Family history of Diabetes Mellitus (V18 0)   6  Family history of Emphysema   7  Family history of Glaucoma   8  Family history of Heart Disease (V17 49)   9  Family history of Hypertension (V17 49)   10  Family history of Mixed Hyperlipoproteinemia    Social History    · College graduate   ·    · Employed   · Never A Smoker   · No history of alcohol use    Future Appointments    Date/Time Provider Specialty Site   10/07/2016 10:00 AM GEORGETTE Curtis   [facility] PARTIAL HOSPITALIZATION     Signatures   Electronically signed by : FAYE Ruffin; Oct  6 2016  9:36AM EST                       (Author)

## 2018-01-13 NOTE — PSYCH
History of Present Illness  [facility] Clinical Progress [facility]:   Specialized Services Documentation - Therapist must complete separate progress note for each specific clinical activity in which the client participated during the day  Case Management Note: Individual Case Management visit not provided today  [address] [facility] excused related to IOP status  Jevon Beck MT-BC      Active Problems    1  ADHD, predominantly inattentive type (314 00) (F90 0)   2  Allergic rhinitis (477 9) (J30 9)   3  Anxiety and depression (300 00,311) (F41 9,F32 9)   4  Benign essential hypertension (401 1) (I10)   5  BMI 31 0-31 9,adult (V85 31) (Z68 31)   6  Cervicalgia (723 1) (M54 2)   7  Constipation (564 00) (K59 00)   8  Dysfunction of eustachian tube, unspecified laterality (381 81) (H69 80)   9  Encounter for screening mammogram for malignant neoplasm of breast (V76 12)   (Z12 31)   10  FORTINO (generalized anxiety disorder) (300 02) (F41 1)   11  Genital herpes simplex (054 10) (A60 00)   12  Headache (784 0) (R51)   13  Hip pain, unspecified laterality   14  Hypokalemia (276 8) (E87 6)   15  Lumbago (724 2) (M54 5)   16  Lyme disease (088 81) (A69 20)   17  Motion sickness (994 6) (T75 3XXA)   18  Obesity, Class I, BMI 30-34 9 (278 00) (E66 9)   19  Other muscle spasm (728 85) (M62 838)   20  Severe episode of recurrent major depressive disorder, without psychotic features    (296 33) (F33 2)   21  Tachycardia (785 0) (R00 0)   22  Thyroid disorder (246 9) (E07 9)   23  Well adult on routine health check (V70 0) (Z00 00)    Past Medical History    1  History of Acute bronchitis (466 0) (J20 9)   2  History of Acute pharyngitis (462) (J02 9)   3  History of Acute sinusitis (461 9) (J01 90)   4  History of Acute sinusitis (461 9) (J01 90)   5  History of Acute tonsillitis (463) (J03 90)   6  History of Acute upper respiratory infection (465 9) (J06 9)   7   History of Acute upper respiratory infection (465 9) (J06 9)   8  History of Acute UTI (599 0) (N39 0)   9  History of Cervicalgia (723 1) (M54 2)   10  Denied: History of Head trauma   11  History of acute pharyngitis (V12 69) (Z87 09)   12  History of migraine (V12 49) (Z86 69)   13  History of Nonallopathic lesion (739 9) (M99 9)   14  History of Palpitations (785 1) (R00 2)   15  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)   16  Denied: History of Seizure   17  History of Shoulder joint pain, unspecified laterality   18  Urinary tract infection (599 0) (N39 0)    Allergies    1  Percocet TABS    Current Meds   1  Albuterol 90 MCG/ACT AERS; INHALE 1 TO 2 PUFFS EVERY 4 TO 6 HOURS AS   NEEDED; Therapy: (Recorded:91Ont4498) to Recorded   2  BuPROPion HCl ER (XL) 300 MG Oral Tablet Extended Release 24 Hour (Wellbutrin XL);   TAKE 1 TABLET Daily thity tablets; Therapy: 34TAB5400 to (Evaluate:23Jun2016)  Requested for: 94MYI8265; Last   Rx:24May2016 Ordered   3  CloNIDine HCl - 0 1 MG Oral Tablet; Take one tablet PO  AT 6:00 PM and 1 tablet PO AT   9:00 pm;   Therapy: (Recorded:25Thz9271) to Recorded   4  Griselda 0 35 MG Oral Tablet; TAKE 1 TABLET DAILY; Therapy: 86BAN6444 to Recorded   5  Flonase 50 MCG/ACT SUSP (Fluticasone Propionate); USE 1 SPRAY IN EACH NOSTRIL   ONCE DAILY; Therapy: (Recorded:96Aay8393) to Recorded   6  Gabapentin 300 MG Oral Capsule; TAKE 3 CAPSULE Bedtime; Therapy: (Recorded:33Vdk1791) to Recorded   7  HydrOXYzine HCl - 25 MG Oral Tablet; TAKE 1 TO 2 TABLETS AT BEDTIME; Last   Rx:75Zfr2459 Ordered   8  Metoprolol Succinate ER 50 MG Oral Tablet Extended Release 24 Hour; TAKE 1 TABLET   DAILY; Therapy: 96OXH2456 to (Evaluate:16Oct2016)  Requested for: 95FME0585; Last   Rx:19Apr2016 Ordered   9  Multivitamins TABS; 1 Every Day; Therapy: 71SER5131 to  Requested for: 55AIG6890 Recorded   10  Pristiq 100 MG Oral Tablet Extended Release 24 Hour; Take 1 tablet daily; Therapy: 18CDS2570 to  Requested for: 64DUY6769 Recorded   11  Vyvanse 50 MG Oral Capsule; 1 capsule at 2:00pm;    Therapy: 46ISR8755 to  Requested for: 01RKT9187 Recorded   12  Vyvanse 60 MG Oral Capsule; take 1 capsule daily; Therapy: (Recorded:83Azi9610) to Recorded   13  Zaleplon 10 MG Oral Capsule; TAKE 1 TO 2 CAPSULES AT BEDTIME AS NEEDED; Last    MN:61YTZ1008 Ordered    Family Psych History  Son    1  Family history of attention deficit hyperactivity disorder (ADHD) (V17 0) (Z81 8)  Sister    2  Family history of Bipolar 1 disorder  Family History    3  Family history of Asbestosis   4  Family history of Cancer   5  Family history of Diabetes Mellitus (V18 0)   6  Family history of Emphysema   7  Family history of Glaucoma   8  Family history of Heart Disease (V17 49)   9  Family history of Hypertension (V17 49)   10  Family history of Mixed Hyperlipoproteinemia    Social History    · College graduate   ·    · Employed   · Never A Smoker   · No history of alcohol use    Future Appointments    Date/Time Provider Specialty Site   09/16/2016 10:30 AM GEORGETTE Ferguson   Psychiatry [facility] PARTIAL HOSPITALIZATION   09/20/2016 11:00 AM Paulette Hankins [address] THERAPISTS     Signatures   Electronically signed by : FAYE Castro; Sep 15 2016  7:53AM EST                       (Author)

## 2018-01-13 NOTE — DISCHARGE SUMMARY
Discharge Summary  [facility] Discharge Summary [facility]:   Admission Date: 8/31/16  Patient was referred by Dr Luis Angel Conroy  Discharge Date: 10/7/16  This was a routine discharge  Diagnosis: Axis I: Major Depressive Disorder F33 2; FORTINO F41 1; ADHD F90 0  Treating Physician: Dr Lionel Pimentel MD    Treatment Complications: LImited support; uncertainty related to job return  Presenting Problem: Neena referred to [facility] by Dr Luis Angel Conroy due to escalating depression  Hopelessness, isolation "it is could be days before I talk to someone" "it would be days before they would notice I was dead"  Tearful, crying, in pajamas for days, decrease in appetite, lays around but poor sleep with vivid dreams  Over use of Sonata one night last week "scared me"  Excessive guilt related to giving up custody of children  Very upset over "ex-finacee" recently stopping all contact  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  Course of treatment includes group counseling, pharmacotherapy, individual case management, allied therapy, psychoeducation and psychiatric evaluation  Treatment Progress: Ms Zabrina Birch attended 7 PHP and 11 IOP days in which treatment objectives encouraged daily routine, increasing supports and personal self worth  She was inconsistent in following through initially, toward the end of stay - follow through with assignments more regularly  She believed she had been complacent with her WRAP and tx in past and needs to stay more aware  She is using a mindfulness meera for her phone  She did attend 1 community support group and began to look in to volunteering  She did have a longterm offer which did seem to put her at ease 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  She denied SI, HI and psychosis  Her PHQ-9 was an 18 upon admission and an 11 upon discharge  OP care closer to her home was established and encouraged     Aftercare recommendations include    Dominic Ellis - Medication Management - [phone] - 10/26/16 1:30pm  Dr Arelis ward - [phone] 10/11/16 11AM  Keep follow up appointments; Fidel Herman; consider regular attendance at [facility] and volunteer opportunities  Discharge Medications include: Per OP psychiatrist: 10/5/16 - Neurontin increased to 1200mg q day; Clonidine increased to 0 1mg 3 tablets daily  Discharge [address]  Discharge Instructions [facility]:     Disposition: Home/Family  Address: [facility] [address] [address]  Diagnosis: Major Depressive Disorder; Generalized Anxiety Disorder, ADHD  Allergies (Drug/Food): see below  Activity: you may not return to work until if released by OP providers, but you may drive  Diet: reglar  Diagnostic/Laboratory Orders: None ordered  Vaccines: If you received a vaccine, please notify your family physician on your next visit  Pneumococcal vaccine not given, Influenza vaccine not given  Follow-up appointments/Referrals:   Dr Dominic Ellis - Medication Management - [phone] - 10/26/16 1:30pm    Dr Arelis ward - [phone] 10/11/16 11AM    Keep follow up appointments; Fidel Herman; consider regular attendance at [facility] and [address] [place] [address] Psychiatric Associates: [facility] [phone]  Crisis Intervention (Emergency)  [employer]: Jaleesa Fuentes ([place]): [phone]  National Crisis Intervention Hotline: 1-777.301.6040  National Suicide Crisis Hotline: 1-357.765.4922  I, the undersigned, have received and understand the above instructions  Patient/Rep Signature: __________________________________ Date/Time: ______________     Physician Signature: ____________________________________ Date/Time: ______________      Signature: ________________________________ Date/Time: ______________      Current Meds   1   Albuterol 90 MCG/ACT AERS; INHALE 1 TO 2 PUFFS EVERY 4 TO 6 HOURS AS   NEEDED; Therapy: (Recorded:18Gcz2157) to Recorded   2  BuPROPion HCl ER (XL) 300 MG Oral Tablet Extended Release 24 Hour (Wellbutrin XL);   TAKE 1 TABLET Daily thity tablets; Therapy: 70ZXG2970 to (Evaluate:23Jun2016)  Requested for: 47UMI4530; Last   Rx:46Cbn9604 Ordered   3  CloNIDine HCl - 0 1 MG Oral Tablet; Take one tablet PO  AT 6:00 PM and 1 tablet PO AT   9:00 pm;   Therapy: (Recorded:66Dzw8171) to Recorded   4  Griselda 0 35 MG Oral Tablet; TAKE 1 TABLET DAILY; Therapy: 57WFZ2252 to Recorded   5  Flonase 50 MCG/ACT SUSP (Fluticasone Propionate); USE 1 SPRAY IN EACH NOSTRIL   ONCE DAILY; Therapy: (Recorded:01Khs3037) to Recorded   6  Gabapentin 300 MG Oral Capsule; TAKE 3 CAPSULE Bedtime; Last Rx:94Bdu2506   Ordered   7  HydrOXYzine HCl - 50 MG Oral Tablet; TAKE 2 TABLET Bedtime; Last Rx:26Ekb1091   Ordered   8  Metoprolol Succinate ER 50 MG Oral Tablet Extended Release 24 Hour; TAKE 1 TABLET   DAILY; Therapy: 36SRS8933 to (Evaluate:16Oct2016)  Requested for: 73PTQ6929; Last   Rx:81Uqn2179 Ordered   9  Multivitamins TABS; 1 Every Day; Therapy: 79YQG0031 to  Requested for: 06YMG8912 Recorded   10  Pristiq 100 MG Oral Tablet Extended Release 24 Hour; Take 1 tablet daily; Therapy: 96RLK6285 to  Requested for: 76ELP1823 Recorded   11  Vyvanse 50 MG Oral Capsule; 1 capsule at 2:00pm;    Therapy: 67BRC7153 to  Requested for: 43QFH5733 Recorded   12  Vyvanse 60 MG Oral Capsule; take 1 capsule daily; Therapy: (Recorded:27Dfw6181) to Recorded   13  Zaleplon 10 MG Oral Capsule; TAKE 1 TO 2 CAPSULES AT BEDTIME AS NEEDED; Last    Rx:76Gqp9928 Ordered    Allergies    1   Percocet TABS    Signatures   Electronically signed by : FAYE Núñez; Oct  6 2016  9:41AM EST                       (Author)    Electronically signed by : FAYE Núñez; Oct  7 2016  4:00PM EST                       (Author)    Electronically signed by : GEORGETTE Pruitt ; Oct 10 2016  7:45AM EST (Author)

## 2018-01-13 NOTE — PSYCH
History of Present Illness  [facility] Clinical Progress Note [facility]:   Specialized Services Documentation - Therapist must complete separate progress note for each specific clinical activity in which the client participated during the day  Case Management Note:   0800 Celso Castro is excused from [address] today due to a pre-scheduled appointment  Current suicide risk is low  Medications not changed/added/denied  Krystina Liu RN      Active Problems    1  ADHD, predominantly inattentive type (314 00) (F90 0)   2  Allergic rhinitis (477 9) (J30 9)   3  Anxiety and depression (300 00,311) (F41 9,F32 9)   4  Benign essential hypertension (401 1) (I10)   5  BMI 31 0-31 9,adult (V85 31) (Z68 31)   6  Cervicalgia (723 1) (M54 2)   7  Constipation (564 00) (K59 00)   8  Dysfunction of eustachian tube, unspecified laterality (381 81) (H69 80)   9  Encounter for screening mammogram for malignant neoplasm of breast (V76 12)   (Z12 31)   10  FORTINO (generalized anxiety disorder) (300 02) (F41 1)   11  Genital herpes simplex (054 10) (A60 00)   12  Headache (784 0) (R51)   13  Hip pain, unspecified laterality   14  Hypokalemia (276 8) (E87 6)   15  Lumbago (724 2) (M54 5)   16  Lyme disease (088 81) (A69 20)   17  Motion sickness (994 6) (T75 3XXA)   18  Obesity, Class I, BMI 30-34 9 (278 00) (E66 9)   19  Other muscle spasm (728 85) (M62 838)   20  Severe episode of recurrent major depressive disorder, without psychotic features    (296 33) (F33 2)   21  Tachycardia (785 0) (R00 0)   22  Thyroid disorder (246 9) (E07 9)   23  Well adult on routine health check (V70 0) (Z00 00)    Past Medical History    1  History of Acute bronchitis (466 0) (J20 9)   2  History of Acute pharyngitis (462) (J02 9)   3  History of Acute sinusitis (461 9) (J01 90)   4  History of Acute sinusitis (461 9) (J01 90)   5  History of Acute tonsillitis (463) (J03 90)   6   History of Acute upper respiratory infection (465 9) (J06 9)   7  History of Acute upper respiratory infection (465 9) (J06 9)   8  History of Acute UTI (599 0) (N39 0)   9  History of Cervicalgia (723 1) (M54 2)   10  History of acute pharyngitis (V12 69) (Z87 09)   11  History of migraine (V12 49) (Z86 69)   12  History of Nonallopathic lesion (739 9) (M99 9)   13  History of Palpitations (785 1) (R00 2)   14  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)   15  History of Shoulder joint pain, unspecified laterality   16  Urinary tract infection (599 0) (N39 0)    Allergies    1  Percocet TABS    Current Meds   1  Albuterol 90 MCG/ACT AERS; INHALE 1 TO 2 PUFFS EVERY 4 TO 6 HOURS AS   NEEDED; Therapy: (Recorded:52Azk3019) to Recorded   2  BuPROPion HCl ER (XL) 300 MG Oral Tablet Extended Release 24 Hour (Wellbutrin XL);   TAKE 1 TABLET Daily thity tablets; Therapy: 91EYJ5996 to (Evaluate:23Jun2016)  Requested for: 80ACR5450; Last   Rx:[id number] Ordered   3  Griselda 0 35 MG Oral Tablet; TAKE 1 TABLET DAILY; Therapy: 29NFM3570 to Recorded   4  Flonase 50 MCG/ACT SUSP (Fluticasone Propionate); USE 1 SPRAY IN EACH NOSTRIL   ONCE DAILY; Therapy: (Recorded:31Emv3830) to Recorded   5  HydrOXYzine HCl - 25 MG Oral Tablet; TAKE 1 TO 2 TABLETS AT BEDTIME; Last   Rx:[id number] Ordered   6  Metoprolol Succinate ER 50 MG Oral Tablet Extended Release 24 Hour; TAKE 1 TABLET   DAILY; Therapy: 37IVJ9443 to (Evaluate:16Oct2016)  Requested for: 13YYL0600; Last   Rx:[id number] Ordered   7  Multivitamins TABS; 1 Every Day; Therapy: 63ZQQ5215 to  Requested for: 07PST2032 Recorded   8  Pristiq 100 MG Oral Tablet Extended Release 24 Hour; Take 1 tablet daily; Therapy: 29IDV5008 to  Requested for: 16CMK6003 Recorded   9  Vyvanse 50 MG Oral Capsule; 1 capsule at 2:00pm;   Therapy: 68TJV5286 to  Requested for: 87KEE1558 Recorded   10  Vyvanse 60 MG Oral Capsule; take 1 capsule daily; Therapy: (Recorded:91Utj5914) to Recorded   11   Zaleplon 10 MG Oral Capsule; TAKE 1 TO 2 CAPSULES AT BEDTIME AS NEEDED; Last    UZ:50HQA8657 Ordered    Family Psych History  Son    1  Family history of attention deficit hyperactivity disorder (ADHD) (V17 0) (Z81 8)  Sister    2  Family history of Bipolar 1 disorder  Family History    3  Family history of Asbestosis   4  Family history of Cancer   5  Family history of Diabetes Mellitus (V18 0)   6  Family history of Emphysema   7  Family history of Glaucoma   8  Family history of Heart Disease (V17 49)   9  Family history of Hypertension (V17 49)   10  Family history of Mixed Hyperlipoproteinemia    Social History    · College graduate   ·    · Employed   · Never A Smoker   · No history of alcohol use    Future Appointments    Date/Time Provider Specialty Site   06/06/2016 10:30 AM GEORGETTE Yun  Psychiatry [facility]   06/07/2016 10:00 AM GEORGETTE Yun  Psychiatry [facility]   06/08/2016 10:30 AM GEORGETTE Yun  Psychiatry [facility]   06/09/2016 10:30 AM GEORGETTE Yun   Psychiatry [facility]   07[facility]14[facility]2016 11[facility]     Signatures   Electronically signed by : Daniela Pugh RN; Larry  3 2016  2:09PM EST                       (Author)

## 2018-01-13 NOTE — PSYCH
[facility] Treatment Plan    [facility] Treatment Plan   CHALLENGES/PROBLEMS/NEEDS: Increased depression exacerbated by end of relationship with mya and his move out of home, feelings of hopelessness, anxiety and panic episodes, missing work, crying, sleep disturbances, poor concentration  Date Initiated: 5/20/16     LONG TERM GOAL: 1 0 I will identify three signs that my depressive symptoms have less impact on my daily life and that I am more productive   Date Initiated: 5/20/16   Target Date: 7/01/16   Completion Date: 6/24/16     Date Initiated:    Revision Date: 6/1/16   1 1 I will identify two symptoms of my depression and I will name two strategies I am utilizing to support my wellness on a daily basis to decrease symptoms  Target Date: 6/1/16   Completion Date: 6/24/16   Date Initiated: 5/20/16   Revision Date: 6/1/16   1 2 I will identify two cognitive/behavioral skills I can use to refocus my anxious/negative thoughts and practice these skills twice daily  Target Date: 6/1/16   Completion Date: 6/24/16   Date Initiated: 5/20/16   Revision Date: 6/1/16   1 3 I will take my medication as directed and report any side effects if/when they occur  Target Date: 6/1/16   Completion Date: 6/24/16   Date Initiated: 5/20/16   Revision Date: 6/1/16   1 4 I will identify three supports I can utilize in my recovery and agree to staff/support contact as indicated  Target Date: 6/1/16   Completion Date: 6/24/16          7 DAY REVISION: 1 1,1 2,1 3,1 4 Continue goals as unmet but achievable  EMT  , 1 5 I will name my triggers for loss of impulse control then identify three ways I can better handle my impulse control on a daily and crisis basis  1 5 Continue goal as remained unmet but achievable  Isabel Lincoln RN, 1 1,1 2,1 1 3,1 4 Continue goals as partially met but attainable  Isabel Lincoln RN, 1 5 Continue goal as remained unmet but achievable    Isabel Lincoln RN   Date Initiated: 6/1/16   Revision Date: 6/10/16   Target Date: 16   Completion Date: 16   Date Initiated: 16   Revision Date: 6/10/16   Target Date: 16   Completion Date: 16   Date Initiated: 6/10/16   Revision Date: 16   Target Date: 16   Completion Date: 16   Date Initiated: 6/10/16   Revision Date: 16   Target Date: 16   Completion Date: 16     PSYCHIATRY: Medication management and education  TMJ    Medication Management and Education  TMJ  Continue medication management and education  Date Initiated: 2016   Revision Date: 16, 2016     NURSIN 1,1 2,1 3,1 4 Provide wellness group daily to educate patient on signs and symptoms of depression, anxiety and medications used in treatment  EMT  1 1,1 2,1 3,1 4 Continue to provide wellness group daily, Mon through Fri, and offer encouragement for goal attainment  EMT  1 1,1 2,1 3,1 4 and 1 5 continue to provide wellness group Monday, Wednesday and Friday and offer support and positive reinforcement for goal attainment  Paloma Sauer RN   Date Initiated: 16   Revision Date: 6/10/16       PSYCHOLOGY: 1 1, 1 2 Group therapy 5x/week to offer opportunity to identify and discuss issues and coping  AAD  1 1, 1 2 Continue group psychotherapy 5x/week to allow patient to continue to explore stressors and ways of coping  HARDIK  1 1, 1 2 Continue to offer group psychotherapy 3x/week to encourage patient to continue to explore stressors and further develop healthier coping skills  HARDIK   Date Initiated: 16   Revision Date: 6/10/2016     ALLIED THERAPY: 1 1, 1 2 AT daily to address the development and use of wellness tools to encourage management of symptoms and support recovery through meaningful activity  SMM  1 1,1 2,1 5 Continue AT to encourage the application of wellness tools to encourage recovery through healthy tasks   SMM  1 1, 1 2 , 1 5 Continue AT 2-3 times weekly to encourage transfer of skills from IOP to unstructured time to support her transition to OP through exploration of successes and challenges in activity based learning  SMM     Date Initiated: 5/20/16   Revision Date: 6/1/16; 6/9/16           CASE MANAGEMENT: 1 0 Meet with patient 3-4 times weekly to address treatment progress, supports, UR and Discharge Plan  EMT  2 0 Continue to meet with pt 3-4 times wkly to assist with txmnt progress, identifying supports, UR and D/C plan  EMT   3 0 Continue to meet with patient 3 times a week to assist and reinforce treatment progress, identify supports and prepare for upcoming discharge  Solitario Steinberg RN   Date Initiated: 5/20/16   Revision Date: 6/10/16         DISCHARGE CRITERIA: Identify three signs of improved mood and complete Discharge Relapse Prevention Plan  DISCHARGE PLAN: OP Providers and Support Group Attendance  Estimated Length of Stay: 7-10 days     Strengths: Pt is voluntarily presenting for treatment  Limitations: Lack of supports  Diagnosis and Treatment Plan explained to patient, patient relates understanding diagnosis and is agreeable to Treatment Plan                   Patient Signature: _________________________________ Date/Time: ______________      Signatures   Electronically signed by : GEORGETTE Pruitt ; May 20 2016 10:50AM EST                       (Author)    Electronically signed by : FAYE Núñez; May 20 2016  2:00PM EST                       (Author)    Electronically signed by : Yajaira Matthew RN; May 20 2016  3:56PM EST                       (Author)    Electronically signed by : Venus Price, ; May 23 2016  9:12AM EST                       (Author)    Electronically signed by : Yajaira Matthew RN; May 23 2016 10:00AM EST                       (Author)    Electronically signed by : Yajaira Matthew RN; Jun 1 2016  4:49PM EST                       (Author)    Electronically signed by : FAYE Núñez; Jun 2 2016  7:34AM EST                       (Author) Electronically signed by : JONATHAN Heart; Jun 2 2016  8:06AM EST                       (Author)    Electronically signed by : GEORGETTE Taveras ; Jun 2 2016  9:39AM EST                       (Author)    Electronically signed by : GEORGETTE Little ; Larry 10 2016  8:46AM EST                       (Author)    Electronically signed by : Don Jones RN; Larry 10 2016 12:58PM EST                       (Author)    Electronically signed by : Don Jones RN; Larry 10 2016  1:08PM EST                       (Author)    Electronically signed by : Scottie Bumpers, MT-BC; Larry 10 2016  3:02PM EST                       (Author)    Electronically signed by : JONATHAN Heart; Larry 10 2016  3:29PM EST                       (Author)    Electronically signed by : Magdalena Shen RN; Jun 28 2016  5:09PM EST                       (Author)

## 2018-01-14 VITALS
TEMPERATURE: 97.6 F | WEIGHT: 157 LBS | DIASTOLIC BLOOD PRESSURE: 70 MMHG | HEIGHT: 59 IN | SYSTOLIC BLOOD PRESSURE: 100 MMHG | HEART RATE: 72 BPM | RESPIRATION RATE: 20 BRPM | BODY MASS INDEX: 31.65 KG/M2

## 2018-01-14 NOTE — PSYCH
History of Present Illness  [facility] Progress Note [facility]:   Specialized Services Documentation - Therapist must complete separate progress note for each specific clinical activity in which the client participated during the day  Case Management Note: Individual Case Management visit not provided today  Mariano Us 117 excused related to IOP status  Maliha Friend, [facility]      Active Problems    1  ADHD, predominantly inattentive type (314 00) (F90 0)   2  Allergic rhinitis (477 9) (J30 9)   3  Anxiety and depression (300 00,311) (F41 9,F32 9)   4  Benign essential hypertension (401 1) (I10)   5  BMI 31 0-31 9,adult (V85 31) (Z68 31)   6  Cervicalgia (723 1) (M54 2)   7  Constipation (564 00) (K59 00)   8  Dysfunction of eustachian tube, unspecified laterality (381 81) (H69 80)   9  Encounter for screening mammogram for malignant neoplasm of breast (V76 12)   (Z12 31)   10  FORTINO (generalized anxiety disorder) (300 02) (F41 1)   11  Genital herpes simplex (054 10) (A60 00)   12  Headache (784 0) (R51)   13  Hip pain, unspecified laterality   14  Hypokalemia (276 8) (E87 6)   15  Lumbago (724 2) (M54 5)   16  Lyme disease (088 81) (A69 20)   17  Motion sickness (994 6) (T75 3XXA)   18  Obesity, Class I, BMI 30-34 9 (278 00) (E66 9)   19  Other muscle spasm (728 85) (M62 838)   20  Severe episode of recurrent major depressive disorder, without psychotic features    (296 33) (F33 2)   21  Tachycardia (785 0) (R00 0)   22  Thyroid disorder (246 9) (E07 9)   23  Well adult on routine health check (V70 0) (Z00 00)    Past Medical History    1  History of Acute bronchitis (466 0) (J20 9)   2  History of Acute pharyngitis (462) (J02 9)   3  History of Acute sinusitis (461 9) (J01 90)   4  History of Acute sinusitis (461 9) (J01 90)   5  History of Acute tonsillitis (463) (J03 90)   6  History of Acute upper respiratory infection (465 9) (J06 9)   7   History of Acute upper respiratory infection (465 9) (J06 9)   8  History of Acute UTI (599 0) (N39 0)   9  History of Cervicalgia (723 1) (M54 2)   10  Denied: History of Head trauma   11  History of acute pharyngitis (V12 69) (Z87 09)   12  History of migraine (V12 49) (Z86 69)   13  History of Nonallopathic lesion (739 9) (M99 9)   14  History of Palpitations (785 1) (R00 2)   15  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)   16  Denied: History of Seizure   17  History of Shoulder joint pain, unspecified laterality   18  Urinary tract infection (599 0) (N39 0)    Allergies    1  Percocet TABS    Current Meds   1  Albuterol 90 MCG/ACT AERS; INHALE 1 TO 2 PUFFS EVERY 4 TO 6 HOURS AS   NEEDED; Therapy: (Recorded:76Rss5957) to Recorded   2  BuPROPion HCl ER (XL) 300 MG Oral Tablet Extended Release 24 Hour; TAKE 1   TABLET Daily thity tablets; Therapy: 84SSR9615 to (Evaluate:23Jun2016)  Requested for: 66ROD3226; Last   Rx:[id number] Ordered   3  CloNIDine HCl - 0 1 MG Oral Tablet; Take one tablet PO  AT 6:00 PM and 1 tablet PO AT   9:00 pm;   Therapy: (Recorded:41Bxu7970) to Recorded   4  Griselda 0 35 MG Oral Tablet; TAKE 1 TABLET DAILY; Therapy: 00TWI2152 to Recorded   5  Flonase 50 MCG/ACT SUSP; USE 1 SPRAY IN EACH NOSTRIL ONCE DAILY; Therapy: (Recorded:97Xio6864) to Recorded   6  Gabapentin 300 MG Oral Capsule; TAKE 3 CAPSULE Bedtime; Last Rx:72Nuz6498   Ordered   7  HydrOXYzine HCl - 50 MG Oral Tablet; TAKE 2 TABLET Bedtime; Last Rx:73Ywa9440   Ordered   8  Metoprolol Succinate ER 50 MG Oral Tablet Extended Release 24 Hour; TAKE 1 TABLET   DAILY; Therapy: 82AON8192 to (Evaluate:16Oct2016)  Requested for: 32KJY2177; Last   Rx:[id number] Ordered   9  Multivitamins TABS; 1 Every Day; Therapy: 16DPA5753 to  Requested for: 18GDA3076 Recorded   10  Pristiq 100 MG Oral Tablet Extended Release 24 Hour; Take 1 tablet daily; Therapy: 75RSV0464 to  Requested for: 43OYT4725 Recorded   11   Vyvanse 50 MG Oral Capsule; 1 capsule at 2:00pm;    Therapy: 23PSB7174 to  Requested for: 28FMK1858 Recorded   12  Vyvanse 60 MG Oral Capsule; take 1 capsule daily; Therapy: (Recorded:38Lol5806) to Recorded   13  Zaleplon 10 MG Oral Capsule; TAKE 1 TO 2 CAPSULES AT BEDTIME AS NEEDED; Last    Rx:39Kzw0315 Ordered    Family Psych History  Son    1  Family history of attention deficit hyperactivity disorder (ADHD) (V17 0) (Z81 8)  Sister    2  Family history of Bipolar 1 disorder  Family History    3  Family history of Asbestosis   4  Family history of Cancer   5  Family history of Diabetes Mellitus (V18 0)   6  Family history of Emphysema   7  Family history of Glaucoma   8  Family history of Heart Disease (V17 49)   9  Family history of Hypertension (V17 49)   10  Family history of Mixed Hyperlipoproteinemia    Social History    · College graduate   ·    · Employed   · Never A Smoker   · No history of alcohol use    Future Appointments    Date/Time Provider Specialty Site   10/04/2016 10:00 AM GEORGETTE Wilde  Psychiatry [facility]   10/06/2016 12:00 PM Chance Capps M D  Psychiatry [facility]   10/07/2016 10:00 AM GEORGETTE Wilde   Psychiatry [facility]     Signatures   Electronically signed by : FAYE Henderson; Oct  3 2016  8:36AM EST                       (Author)

## 2018-01-14 NOTE — PSYCH
Assessment    1  ADHD, predominantly inattentive type (314 00) (F90 0)   2  FORTINO (generalized anxiety disorder) (300 02) (F41 1)   3  Severe episode of recurrent major depressive disorder, without psychotic features   (296 33) (F33 2)    [facility] Physician's Orders  ADMIT TO: Partial Hospitalization 5 x per week for 15 days  Vital signs routine  Diet: regular  Group Psychotherapy 9 x per week    Allied Therapy Group 6 x per week     Diagnosis: F 41 1, F33 2,F90 0  Medications:   âI certify that the continuation of Partial Hospitalization services is medically necessary to improve and/or maintain the patient's condition and functional level, and to prevent relapse or hospitalization, and that this could not be done at a less intensive level of care  â     Physician Signature: Hermann Dillard MD     Nurse Signature: Paola Gallardo RN      Chief Complaint  This is a 35year-old female with a history of Generalized Anxiety, Major Depression and ADHD presented for evaluation because she feels very depressed and anxious  History of Present Illness  Huffterrance Martinez is a 35year-old female with a history of Generalized Anxiety, Major Depression and Attention deficit Disorder came referred by Dr Walker Eldridge due to severe anxiety, increased depression, passive suicidal thoughts, denies any plan or intent  She has poor concentration, feels hopeless, helpless, isolating, feeling guilty of given the custody of her children to the father  She is stress out because she needs to return to work  Today she feels depressed, anxious, denies suicidal thoughts, plan or intent, denies any homicidal thoughts, denies any psychotic symptoms, denies any manic episodes  Her PHQ-9 is 18  Review of Systems  depression and decreased functioning ability  Constitutional: No fever, no chills, no recent weight gain or recent weight loss     ENT: no ear ache, no loss of hearing, no nosebleeds or nasal discharge, no sore throat or hoarseness  Cardiovascular: no complaints of slow or fast heart rate, no chest pain, no palpitations, no leg claudication or lower extremity edema  Respiratory: no complaints of shortness of breath, no wheezing, no dyspnea on exertion, no orthopnea or PND  Gastrointestinal: no complaints of abdominal pain, no constipation, no nausea or diarrhea, no vomiting, no bloody stools  Genitourinary: no complaints of dysuria, no incontinence, no pelvic pain, no dysmenorrhea, no vaginal discharge or abnormal vaginal bleeding  Musculoskeletal: no complaints of arthralgia, no myalgia, no joint swelling or stiffness, no limb pain or swelling  Integumentary: no complaints of skin rash or lesion, no itching or dry skin, no skin wounds  Neurological: no complaints of headache, no confusion, no numbness or tingling, no dizziness or fainting  Other Symptoms: Endocrine is negative  ROS reviewed  Past Psychiatric History    Past Psychiatric History: She has a history of Generalized Anxiety, Major Depression and Attention deficit Disorder ; had 2 psychiatric impatient admissions, on her 27's and other at [facility] on 5/9/2016  She was at [facility] on 5/2016  She follows up with Dr Jaime Hendricks and Ziyad Manning a therapist  She denies any history of suicidal attempt, denies any history violent behavior  She has been on Effexor , Pristiq, Zoloft, Sonata, Clonidine, Vivance, Wellbutrin and Klonopin  Substance Abuse Hx    Substance Abuse History: She denies any alcohol , denies any drugs, or tobacco           Active Problems    1  ADHD, predominantly inattentive type (314 00) (F90 0)   2  Allergic rhinitis (477 9) (J30 9)   3  Anxiety and depression (300 00,311) (F41 9,F32 9)   4  Benign essential hypertension (401 1) (I10)   5  BMI 31 0-31 9,adult (V85 31) (Z68 31)   6  Cervicalgia (723 1) (M54 2)   7  Constipation (564 00) (K59 00)   8   Dysfunction of eustachian tube, unspecified laterality (381 81) (H69 80)   9  Encounter for screening mammogram for malignant neoplasm of breast (V76 12)   (Z12 31)   10  FORTINO (generalized anxiety disorder) (300 02) (F41 1)   11  Genital herpes simplex (054 10) (A60 00)   12  Headache (784 0) (R51)   13  Hip pain, unspecified laterality   14  Hypokalemia (276 8) (E87 6)   15  Lumbago (724 2) (M54 5)   16  Lyme disease (088 81) (A69 20)   17  Motion sickness (994 6) (T75 3XXA)   18  Obesity, Class I, BMI 30-34 9 (278 00) (E66 9)   19  Other muscle spasm (728 85) (M62 838)   20  Severe episode of recurrent major depressive disorder, without psychotic features    (296 33) (F33 2)   21  Tachycardia (785 0) (R00 0)   22  Thyroid disorder (246 9) (E07 9)   23  Well adult on routine health check (V70 0) (Z00 00)    Past Medical History    1  History of Acute bronchitis (466 0) (J20 9)   2  History of Acute pharyngitis (462) (J02 9)   3  History of Acute sinusitis (461 9) (J01 90)   4  History of Acute sinusitis (461 9) (J01 90)   5  History of Acute tonsillitis (463) (J03 90)   6  History of Acute upper respiratory infection (465 9) (J06 9)   7  History of Acute upper respiratory infection (465 9) (J06 9)   8  History of Acute UTI (599 0) (N39 0)   9  History of Cervicalgia (723 1) (M54 2)   10  Denied: History of Head trauma   11  History of acute pharyngitis (V12 69) (Z87 09)   12  History of migraine (V12 49) (Z86 69)   13  History of Nonallopathic lesion (739 9) (M99 9)   14  History of Palpitations (785 1) (R00 2)   15  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)   16  Denied: History of Seizure   17  History of Shoulder joint pain, unspecified laterality   18  Urinary tract infection (599 0) (N39 0)    The active problems and past medical history were reviewed and updated today  Surgical History    The surgical history was reviewed and updated today  Allergies    1  Percocet TABS    Current Meds   1   Albuterol 90 MCG/ACT AERS; INHALE 1 TO 2 PUFFS EVERY 4 TO 6 HOURS AS NEEDED; Therapy: (Recorded:20May2016) to Recorded   2  BuPROPion HCl ER (XL) 300 MG Oral Tablet Extended Release 24 Hour; TAKE 1   TABLET Daily thity tablets; Therapy: 96RLP8619 to (Evaluate:23Jun2016)  Requested for: 10NPE0743; Last   Rx:24May2016 Ordered   3  CloNIDine HCl - 0 1 MG Oral Tablet; Take one tablet PO  AT 6:00 PM and 1 tablet PO AT   9:00 pm;   Therapy: (Recorded:09Ohq4232) to Recorded   4  Griselda 0 35 MG Oral Tablet; TAKE 1 TABLET DAILY; Therapy: 12ZZQ6497 to Recorded   5  Flonase 50 MCG/ACT SUSP; USE 1 SPRAY IN EACH NOSTRIL ONCE DAILY; Therapy: (Recorded:20May2016) to Recorded   6  Gabapentin 300 MG Oral Capsule; TAKE 3 CAPSULE Bedtime; Therapy: (Recorded:02Apj9586) to Recorded   7  HydrOXYzine HCl - 25 MG Oral Tablet; TAKE 1 TO 2 TABLETS AT BEDTIME; Last   Rx:24May2016 Ordered   8  Metoprolol Succinate ER 50 MG Oral Tablet Extended Release 24 Hour; TAKE 1 TABLET   DAILY; Therapy: 21FSD3248 to (Evaluate:16Oct2016)  Requested for: 90IGU1860; Last   Rx:19Apr2016 Ordered   9  Multivitamins TABS; 1 Every Day; Therapy: 67LMD9657 to  Requested for: 97YBU5569 Recorded   10  Pristiq 100 MG Oral Tablet Extended Release 24 Hour; Take 1 tablet daily; Therapy: 35PQS7403 to  Requested for: 75YFD8300 Recorded   11  Vyvanse 50 MG Oral Capsule; 1 capsule at 2:00pm;    Therapy: 56HCN7806 to  Requested for: 44MZB9007 Recorded   12  Vyvanse 60 MG Oral Capsule; take 1 capsule daily; Therapy: (Recorded:24May2016) to Recorded   13  Zaleplon 10 MG Oral Capsule; TAKE 1 TO 2 CAPSULES AT BEDTIME AS NEEDED; Last    ED:34PZV1629 Ordered    The medication list was reviewed and updated today  Family Psych History  Son    1  Family history of attention deficit hyperactivity disorder (ADHD) (V17 0) (Z81 8)  Sister    2  Family history of Bipolar 1 disorder  Family History    3  Family history of Asbestosis   4  Family history of Cancer   5  Family history of Diabetes Mellitus (V18 0)   6   Family history of Emphysema   7  Family history of Glaucoma   8  Family history of Heart Disease (V17 49)   9  Family history of Hypertension (V17 49)   10  Family history of Mixed Hyperlipoproteinemia  Positive for Bipolar Disorder in her sister and ADHD on her son  The family history was reviewed and updated today  Social History    · College graduate   ·    · Employed   · Never A Smoker   · No history of alcohol use  The social history was reviewed and updated today  The patient is  , lives alone, , has 3 children ages, 15, 6,9 all are with the father; she is a teacher , Bachelor's degree in Education   No  history no legal issues  History Of Phys/Sex Abuse Or Perpetration    History Of Phys/Sex Abuse or Perpetration: She denies any history of physical abuse and sexual abuse  Vitals  Signs   Recorded: 38ULW3221 16:73DF   Systolic: 038, LUE, Sitting  Diastolic: 84, LUE, Sitting  Heart Rate: 79, L Radial  Pulse Quality: Normal, L Radial  Respiration Quality: Normal  Respiration: 16  Temperature: 98 F, Oral  Pain Scale: 0  Height: 4 ft 11 in  Weight: 158 lb 4 oz  BMI Calculated: 31 96  BSA Calculated: 1 67    Physical Exam    Appearance: was calm and cooperative, adequate hygiene and grooming and good eye contact  Observed mood: depressed  Observed mood: affect was constricted  Speech: a normal rate  Thought processes: coherent/organized  Hallucinations: no hallucinations present  Thought Content: no delusions  Abnormal Thoughts: The patient has no suicidal thoughts and no homicidal thoughts  Orientation: The patient is oriented to person, place and time  Recent and Remote Memory: short term memory intact and long term memory intact  Attention Span And Concentration: concentration impaired  Insight: Limited insight  Judgment: Her judgment was limited  Muscle Strength And Tone  Muscle strength and tone were normal  Normal gait and station     Language: no difficulty naming common objects, no difficulty repeating a phrase and no difficulty writing a sentence  Fund of knowledge: Patient displays adequate knowledge of current events, adequate fund of knowledge regarding past history and adequate fund of knowledge regarding vocabulary  The patient is experiencing no localized pain  On a scale of 0 - 10 the pain severity is a 0  Treatment Recommendations: 1  Admit to [facility] 2  Medication Management 3  Group Therapy  Risks, Benefits And Possible Side Effects Of Medications: Risks, benefits, and possible side effects of medications explained to patient and patient verbalizes understanding, Risks of medications explained if female patient  Patient verbalizes understanding and agrees to notify her doctor if she becomes pregnant  DSM    Provisional Diagnosis: Generalized Anxiety Disorder   Major Depression Severe Recurrent without psychotic symptoms  Attention deficit Disorder    End of Encounter Meds    1  Vyvanse 50 MG Oral Capsule; 1 capsule at 2:00pm;   Therapy: 10MDF3200 to  Requested for: 81OLR0938 Recorded   2  Vyvanse 60 MG Oral Capsule; take 1 capsule daily; Therapy: (Recorded:28Sfk0336) to Recorded    3  Albuterol 90 MCG/ACT AERS; INHALE 1 TO 2 PUFFS EVERY 4 TO 6 HOURS AS   NEEDED; Therapy: (Recorded:88Whu0616) to Recorded   4  Flonase 50 MCG/ACT SUSP (Fluticasone Propionate); USE 1 SPRAY IN EACH NOSTRIL   ONCE DAILY; Therapy: (Recorded:50Sev5308) to Recorded    5  Metoprolol Succinate ER 50 MG Oral Tablet Extended Release 24 Hour; TAKE 1 TABLET   DAILY; Therapy: 42JLX6260 to (Evaluate:16Oct2016)  Requested for: 28BCJ9140; Last   Rx:19Apr2016 Ordered    6  HydrOXYzine HCl - 25 MG Oral Tablet; TAKE 1 TO 2 TABLETS AT BEDTIME; Last   Rx:87Alk2345 Ordered    7  CloNIDine HCl - 0 1 MG Oral Tablet; Take one tablet PO  AT 6:00 PM and 1 tablet PO AT   9:00 pm;   Therapy: (Recorded:34Pcr3838) to Recorded   8   Gabapentin 300 MG Oral Capsule; TAKE 3 CAPSULE Bedtime; Therapy: (Recorded:38Isj1537) to Recorded    9  BuPROPion HCl ER (XL) 300 MG Oral Tablet Extended Release 24 Hour (Wellbutrin XL);   TAKE 1 TABLET Daily thity tablets; Therapy: 69FJR4976 to (Evaluate:14Xzj6154)  Requested for: 93RBI5422; Last   Rx:76Zqf9909 Ordered   10  Pristiq 100 MG Oral Tablet Extended Release 24 Hour; Take 1 tablet daily; Therapy: 36TPY1244 to  Requested for: 63UEP9506 Recorded   11  Zaleplon 10 MG Oral Capsule; TAKE 1 TO 2 CAPSULES AT BEDTIME AS NEEDED; Last    Rx:40Tcw1391 Ordered    12  Griselda 0 35 MG Oral Tablet; TAKE 1 TABLET DAILY; Therapy: 99CDW4047 to Recorded   13  Multivitamins TABS; 1 Every Day; Therapy: 95TDK7316 to  Requested for: 35ZPX5709 Recorded    Future Appointments    Date/Time Provider Specialty Site   09/01/2016 10:00 AM GEORGETTE Vera  Psychiatry [facility]   09/02/2016 10:00 AM GEORGETTE Vear   Psychiatry [facility]   09/20/2016 11:00 [facility]     Signatures   Electronically signed by : GEORGETTE Lim ; Aug 31 2016  9:41AM EST                       (Author)    Electronically signed by : Trina Corrales RN; Aug 31 2016 10:44AM EST                       (Author)    Electronically signed by : GEORGETTE Lim ; Aug 31 2016 10:50AM EST                       (Author)    Electronically signed by : GEORGETTE Lim ; Aug 31 2016 10:51AM EST                       (Author)

## 2018-01-14 NOTE — PSYCH
Psych Med Mgmt    Appearance: was calm and cooperative, adequate hygiene and grooming and good eye contact  Observed mood: depressed  Observed mood: affect appropriate  Speech: a normal rate  Thought processes: coherent/organized  Hallucinations: no hallucinations present  Thought Content: no delusions  Abnormal Thoughts: The patient has no suicidal thoughts and no homicidal thoughts  Orientation: The patient is oriented to person, place and time  Recent and Remote Memory: short term memory intact and long term memory intact  Attention Span And Concentration: concentration impaired  Insight: Limited insight  Judgment: Her judgment was limited  Muscle Strength And Tone  Muscle strength and tone were normal  Normal gait and station  Language: no difficulty naming common objects, no difficulty repeating a phrase and no difficulty writing a sentence  Fund of knowledge: Patient displays adequate knowledge of current events, adequate fund of knowledge regarding past history and adequate fund of knowledge regarding vocabulary  The patient is experiencing no localized pain  On a scale of 0 - 10 the pain severity is a 0  Treatment Recommendations: Decreased the Wellbutrin to Wellbutrin  MG PO AM    Continue other medication as ordered  Risks, Benefits And Possible Side Effects Of Medications: Risks, benefits, and possible side effects of medications explained to patient and patient verbalizes understanding  She reports normal appetite, normal energy level, no weight change and decrease in number of sleep hours 5  I evaluate the patient because she still depressed, she is not sleeping well, she states has hallucinations, no commands type  She denies any suicidal thoughts or plan  Assessment    1  ADHD, predominantly inattentive type (314 00) (F90 0)   2  FORTINO (generalized anxiety disorder) (300 02) (F41 1)   3   Severe episode of recurrent major depressive disorder, without psychotic features   (296 33) (F33 2)    Plan    1  HydrOXYzine HCl - 25 MG Oral Tablet; TAKE 1 TO 2 TABLETS AT BEDTIME    2  From  Wellbutrin  MG Oral Tablet Extended Release 24 Hour 1 Every   Day To BuPROPion HCl ER (XL) 300 MG Oral Tablet Extended Release 24 Hour   (Wellbutrin XL) TAKE 1 TABLET Daily thity tablets   3  From  Zaleplon 10 MG Oral Capsule TAKE 1 CAPSULE AT BEDTIME To   Zaleplon 10 MG Oral Capsule TAKE 1 TO 2 CAPSULES AT BEDTIME AS NEEDED    Review of Systems    Constitutional: No fever, no chills, feels well, no tiredness, no recent weight gain or loss  Cardiovascular: no complaints of slow or fast heart rate, no chest pain, no palpitations  Respiratory: no complaints of shortness of breath, no wheezing, no dyspnea on exertion  Gastrointestinal: no complaints of abdominal pain, no constipation, no nausea, no diarrhea, no vomiting  Genitourinary: no complaints of dysuria, no incontinence, no pelvic pain, no urinary frequency  Musculoskeletal: no complaints of arthralgia, no myalgias, no limb pain, no joint stiffness  Integumentary: no complaints of skin rash, no itching, no dry skin  Neurological: no complaints of headache, no confusion, no numbness, no dizziness  Active Problems    1  ADHD, predominantly inattentive type (314 00) (F90 0)   2  Allergic rhinitis (477 9) (J30 9)   3  Benign essential hypertension (401 1) (I10)   4  BMI 31 0-31 9,adult (V85 31) (Z68 31)   5  Cervicalgia (723 1) (M54 2)   6  Constipation (564 00) (K59 00)   7  Dysfunction of eustachian tube, unspecified laterality (381 81) (H69 80)   8  Encounter for screening mammogram for malignant neoplasm of breast (V76 12)   (Z12 31)   9  FORTINO (generalized anxiety disorder) (300 02) (F41 1)   10  Genital herpes simplex (054 10) (A60 00)   11  Headache (784 0) (R51)   12  Hip pain, unspecified laterality   13  Lumbago (724 2) (M54 5)   14  Lyme disease (088 81) (A69 20)   15   Motion sickness (994 6) (T75 3XXA)   16  Obesity, Class I, BMI 30-34 9 (278 00) (E66 9)   17  Other muscle spasm (728 85) (M62 838)   18  Severe episode of recurrent major depressive disorder, without psychotic features    (296 33) (F33 2)   19  Tachycardia (785 0) (R00 0)   20  Thyroid disorder (246 9) (E07 9)   21  Well adult on routine health check (V70 0) (Z00 00)    Past Medical History    1  History of Acute bronchitis (466 0) (J20 9)   2  History of Acute pharyngitis (462) (J02 9)   3  History of Acute sinusitis (461 9) (J01 90)   4  History of Acute sinusitis (461 9) (J01 90)   5  History of Acute tonsillitis (463) (J03 90)   6  History of Acute upper respiratory infection (465 9) (J06 9)   7  History of Acute upper respiratory infection (465 9) (J06 9)   8  History of Acute UTI (599 0) (N39 0)   9  History of Cervicalgia (723 1) (M54 2)   10  History of acute pharyngitis (V12 69) (Z87 09)   11  History of migraine (V12 49) (Z86 69)   12  History of Nonallopathic lesion (739 9) (M99 9)   13  History of Palpitations (785 1) (R00 2)   14  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)   15  History of Shoulder joint pain, unspecified laterality   16  Urinary tract infection (599 0) (N39 0)    The active problems and past medical history were reviewed and updated today  Allergies    1  Percocet TABS    Current Meds   1  Albuterol 90 MCG/ACT AERS; INHALE 1 TO 2 PUFFS EVERY 4 TO 6 HOURS AS   NEEDED; Therapy: (Recorded:08Nsd1924) to Recorded   2  ClonazePAM 1 MG Oral Tablet; Take 1 tablet twice daily; Therapy: 53GZS8057 to  Requested for: 96ILG2816 Recorded   3  Griselda 0 35 MG Oral Tablet; TAKE 1 TABLET DAILY; Therapy: 78VTA8537 to Recorded   4  Flonase 50 MCG/ACT Nasal Suspension; USE 1 SPRAY IN EACH NOSTRIL ONCE   DAILY; Therapy: (Recorded:44Ygc3770) to Recorded   5  HydrOXYzine HCl - 25 MG Oral Tablet; TAKE 1 TO 2 TABLETS AT BEDTIME Recorded   6   Metoprolol Succinate ER 50 MG Oral Tablet Extended Release 24 Hour; TAKE 1 TABLET   DAILY; Therapy: 49EEP5743 to (Evaluate:16Oct2016)  Requested for: 73Zqd0050; Last   Rx:15Lnf4582 Ordered   7  Multivitamins TABS; 1 Every Day; Therapy: 79GSQ1422 to  Requested for: 31JVJ3059 Recorded   8  Pristiq 100 MG Oral Tablet Extended Release 24 Hour; Take 1 tablet daily; Therapy: 62WOW0041 to  Requested for: 71VNY8944 Recorded   9  Vyvanse 50 MG Oral Capsule; 1 capsule at 2:00pm;   Therapy: 46QJZ7552 to  Requested for: 04YJO4926 Recorded   10  Vyvanse 60 MG Oral Capsule; take 1 capsule daily; Therapy: (Recorded:20May2016) to Recorded   11  Wellbutrin  MG Oral Tablet Extended Release 24 Hour; 1 Every Day; Therapy: 20BVB0373 to  Requested for: 72CEK6831 Recorded   12  Zaleplon 10 MG Oral Capsule; TAKE 1 CAPSULE AT BEDTIME; Therapy: (Recorded:20May2016) to Recorded    The medication list was reviewed and updated today  Family Psych History  Son    1  Family history of attention deficit hyperactivity disorder (ADHD) (V17 0) (Z81 8)  Sister    2  Family history of Bipolar 1 disorder  Family History    3  Family history of Asbestosis   4  Family history of Cancer   5  Family history of Diabetes Mellitus (V18 0)   6  Family history of Emphysema   7  Family history of Glaucoma   8  Family history of Heart Disease (V17 49)   9  Family history of Hypertension (V17 49)   10  Family history of Mixed Hyperlipoproteinemia    The family history was reviewed and updated today  Social History    · College graduate   ·    · Employed   · Never A Smoker   · No history of alcohol use  The social history was reviewed and updated today  End of Encounter Meds    1  Vyvanse 50 MG Oral Capsule; 1 capsule at 2:00pm;   Therapy: 46WCY3600 to  Requested for: 87GGC8991 Recorded   2  Vyvanse 60 MG Oral Capsule; take 1 capsule daily; Therapy: (Recorded:20May2016) to Recorded    3  Albuterol 90 MCG/ACT AERS; INHALE 1 TO 2 PUFFS EVERY 4 TO 6 HOURS AS   NEEDED;    Therapy: (Recorded:52Fxx6473) to Recorded   4  Flonase 50 MCG/ACT Nasal Suspension (Fluticasone Propionate); USE 1 SPRAY IN   EACH NOSTRIL ONCE DAILY; Therapy: (Recorded:30Mjg7238) to Recorded    5  Metoprolol Succinate ER 50 MG Oral Tablet Extended Release 24 Hour; TAKE 1 TABLET   DAILY; Therapy: 02IPH5245 to (Evaluate:16Oct2016)  Requested for: 25Iwo9618; Last   Rx:93Aro5310 Ordered    6  ClonazePAM 1 MG Oral Tablet; Take 1 tablet twice daily; Therapy: 60NUH1575 to  Requested for: 88OEE1457 Recorded   7  HydrOXYzine HCl - 25 MG Oral Tablet; TAKE 1 TO 2 TABLETS AT BEDTIME; Last   Rx:67Jod1989 Ordered    8  BuPROPion HCl ER (XL) 300 MG Oral Tablet Extended Release 24 Hour (Wellbutrin XL);   TAKE 1 TABLET Daily thity tablets; Therapy: 85LCS9234 to (Evaluate:23Jun2016)  Requested for: 30BWD6803; Last   Rx:91Ogt4424 Ordered   9  Pristiq 100 MG Oral Tablet Extended Release 24 Hour; Take 1 tablet daily; Therapy: 32LWR3333 to  Requested for: 96HFC5046 Recorded   10  Zaleplon 10 MG Oral Capsule; TAKE 1 TO 2 CAPSULES AT BEDTIME AS NEEDED; Last    Rx:99Kny3496 Ordered    11  Griselda 0 35 MG Oral Tablet; TAKE 1 TABLET DAILY; Therapy: 63IPJ2956 to Recorded   12  Multivitamins TABS; 1 Every Day; Therapy: 43HYD7193 to  Requested for: 57TDC5879 Recorded    Future Appointments    Date/Time Provider Specialty Site   05/25/2016 10:00 AM Thiago Lambert MD Psychiatry [facility]   05/24/2016 03:45 PM Wali Shen[address]   05/26/2016 10:00 AM GEORGETTE Marsh  Psychiatry [facility]   05/27/2016 10:00 AM [facility]GETTE Marsh   [facility]     Messages  10:30 to 10:45              Signatures   Electronically signed by : GEORGETTE Padron ; May 24 2016 11:03AM EST                       (Author)

## 2018-01-15 NOTE — PSYCH
History of Present Illness  [facility] Clinical Progress Note [facility]:   Specialized Services Documentation - Therapist must complete separate progress note for each specific clinical activity in which the client participated during the day  ([phone]) Group Psychotherapy: (9:30-10:30) Neena attended psychotherapy group focused on knowing one's limits and structuring time  Neena identified finances and work as a stressor  Group discussed ways to structure days as they would in program, and talked about involving oneself in new interests/hobbies and such  [address] participated actively in discussion and shared ways that she personally helps herself manage daily tasks, such as using a white board as a reminder  Moderate progress toward goals today  Continue psychotherapy group to encourage Neena to explore stressors and coping  Treatment Plan Problem(s): 1 1, 1 2  Elin CAPUTO, NATALIEW     (281) Group Psychotherapy: [id number] [address] participated in wellness group focused on identifying personal skills; physical, mental and interpersonal and how learning new skills or applying existing skills in more positive ways can contribute to wellness  Neena shared that she feels "stuck" in that the job skills she has 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 use ( ) She did share with group that she has retained a  to help her in passing the GRE exam as she had taken and failed 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  She stated that both the GRE and not being able to think about how to apply her skills to another job are keeping her stuck  [address] made moderate progress toward goal  Continue group to encourage personal skills building to enhance coping and to enhance relapse prevention  Treatment Plan Problem(s): 1 1,1 2  Magdalena Shen RN       (628) Education Therapy Goals set - do laundry    Treatment Plan Problem(s): 1 1  Education Therapy Time - 0900 - 0930 Previous goal was met     Readiness to Learning:  She is receptive to learning  There are  no barriers to learning  Learning Assessment Time - 1330 - 1400   Education completed on  illness and wellness tools  The teaching method was  verbal  Shared area of learning: Yes  Author FAYE Monterroso     (021) Allied Therapy [id number] Neena actively shared in [facility] group focused on relaxation techniques and anxiety reduction  She was observed to be engaged in therapist led relaxation exercises  She was active and shared benefit from Corey Tavares presented and asked for information related to apps she could download  Group discussed specific items that could help self soothe if in an âanxiety crisisâ with encouragement to use these things regularly to manage stressors consistently  Some effort noted toward tx goal  Continue AT to encourage development of wellness skills and consistent practice  Treatment Plan Problem(s): 1 1, 1 2  Author FAYE Monterroso       Case Management Note:   IOP 2  Met with Neena  She attended back to school night for her twins last night and felt positive about that  She has the same at high school tomorrow - she shares this with very limited affect  Discussed planning for time away from program tomorrow and need to be active in her day  She did begin the self-esteem challenged and was asked to continue to pick two areas to work on nightly  TREATMENT SESSION NUMBER: 9   Current suicide risk is low  Medications not changed/added/denied  Author FAYE Monterroso      Active Problems    1  ADHD, predominantly inattentive type (314 00) (F90 0)   2  Allergic rhinitis (477 9) (J30 9)   3  Anxiety and depression (300 00,311) (F41 9,F32 9)   4  Benign essential hypertension (401 1) (I10)   5  BMI 31 0-31 9,adult (V85 31) (Z68 31)   6  Cervicalgia (723 1) (M54 2)   7  Constipation (564 00) (K59 00)   8  Dysfunction of eustachian tube, unspecified laterality (381 81) (H69 80)   9   Encounter for screening mammogram for malignant neoplasm of breast (V76 12)   (Z12 31)   10  FORTINO (generalized anxiety disorder) (300 02) (F41 1)   11  Genital herpes simplex (054 10) (A60 00)   12  Headache (784 0) (R51)   13  Hip pain, unspecified laterality   14  Hypokalemia (276 8) (E87 6)   15  Lumbago (724 2) (M54 5)   16  Lyme disease (088 81) (A69 20)   17  Motion sickness (994 6) (T75 3XXA)   18  Obesity, Class I, BMI 30-34 9 (278 00) (E66 9)   19  Other muscle spasm (728 85) (M62 838)   20  Severe episode of recurrent major depressive disorder, without psychotic features    (296 33) (F33 2)   21  Tachycardia (785 0) (R00 0)   22  Thyroid disorder (246 9) (E07 9)   23  Well adult on routine health check (V70 0) (Z00 00)    Past Medical History    1  History of Acute bronchitis (466 0) (J20 9)   2  History of Acute pharyngitis (462) (J02 9)   3  History of Acute sinusitis (461 9) (J01 90)   4  History of Acute sinusitis (461 9) (J01 90)   5  History of Acute tonsillitis (463) (J03 90)   6  History of Acute upper respiratory infection (465 9) (J06 9)   7  History of Acute upper respiratory infection (465 9) (J06 9)   8  History of Acute UTI (599 0) (N39 0)   9  History of Cervicalgia (723 1) (M54 2)   10  Denied: History of Head trauma   11  History of acute pharyngitis (V12 69) (Z87 09)   12  History of migraine (V12 49) (Z86 69)   13  History of Nonallopathic lesion (739 9) (M99 9)   14  History of Palpitations (785 1) (R00 2)   15  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)   16  Denied: History of Seizure   17  History of Shoulder joint pain, unspecified laterality   18  Urinary tract infection (599 0) (N39 0)    Allergies    1  Percocet TABS    Current Meds   1  Albuterol 90 MCG/ACT AERS; INHALE 1 TO 2 PUFFS EVERY 4 TO 6 HOURS AS   NEEDED; Therapy: (Recorded:41Mit2540) to Recorded   2  BuPROPion HCl ER (XL) 300 MG Oral Tablet Extended Release 24 Hour; TAKE 1   TABLET Daily thity tablets;    Therapy: 29YOB1813 to (Evaluate:23Jun2016)  Requested for: 44RWB4964; Last   NV:96VUJ7289 Ordered   3  CloNIDine HCl - 0 1 MG Oral Tablet; Take one tablet PO  AT 6:00 PM and 1 tablet PO AT   9:00 pm;   Therapy: (Recorded:38Rct9975) to Recorded   4  Griselda 0 35 MG Oral Tablet; TAKE 1 TABLET DAILY; Therapy: 66PYR3760 to Recorded   5  Flonase 50 MCG/ACT SUSP; USE 1 SPRAY IN EACH NOSTRIL ONCE DAILY; Therapy: (Recorded:46Edc2069) to Recorded   6  Gabapentin 300 MG Oral Capsule; TAKE 3 CAPSULE Bedtime; Therapy: (Recorded:31Cjw8833) to Recorded   7  HydrOXYzine HCl - 25 MG Oral Tablet; TAKE 1 TO 2 TABLETS AT BEDTIME; Last   Rx:66Msh6222 Ordered   8  Metoprolol Succinate ER 50 MG Oral Tablet Extended Release 24 Hour; TAKE 1 TABLET   DAILY; Therapy: 83LEB4159 to (Evaluate:16Oct2016)  Requested for: 05BXZ3111; Last   Rx:30Kht7323 Ordered   9  Multivitamins TABS; 1 Every Day; Therapy: 19HQI3482 to  Requested for: 41NRO4085 Recorded   10  Pristiq 100 MG Oral Tablet Extended Release 24 Hour; Take 1 tablet daily; Therapy: 49OTC3934 to  Requested for: 45ESF1637 Recorded   11  Vyvanse 50 MG Oral Capsule; 1 capsule at 2:00pm;    Therapy: 40WKW0523 to  Requested for: 17BOE0236 Recorded   12  Vyvanse 60 MG Oral Capsule; take 1 capsule daily; Therapy: (Recorded:55Pkc6879) to Recorded   13  Zaleplon 10 MG Oral Capsule; TAKE 1 TO 2 CAPSULES AT BEDTIME AS NEEDED; Last    ND:59ZQX0918 Ordered    Family Psych History  Son    1  Family history of attention deficit hyperactivity disorder (ADHD) (V17 0) (Z81 8)  Sister    2  Family history of Bipolar 1 disorder  Family History    3  Family history of Asbestosis   4  Family history of Cancer   5  Family history of Diabetes Mellitus (V18 0)   6  Family history of Emphysema   7  Family history of Glaucoma   8  Family history of Heart Disease (V17 49)   9  Family history of Hypertension (V17 49)   10   Family history of Mixed Hyperlipoproteinemia    Social History    · College graduate   ·    · Employed   · Never A Smoker   · No history of alcohol use    Future Appointments    Date/Time Provider Specialty Site   09/16/2016 10:30 AM GEORGETTE Hall   Psychiatry [facility]   09/20/2016 11:00 AM Phil Hankins [address] [facility]     Signatures   Electronically signed by : JONATHAN Kelley; Sep 14 2016  1:15PM EST                       (Author)    Electronically signed by : FAYE Paige; Sep 14 2016  2:40PM EST                       (Author)    Electronically signed by : Monica Bill RN; Sep 14 2016  3:37PM EST                       (Author)

## 2018-01-15 NOTE — PSYCH
Psych Med Mgmt   10:05 to 10:30     Appearance: was calm and cooperative, adequate hygiene and grooming and good eye contact  Observed mood: depressed  Observed mood: affect was constricted  Speech: a normal rate  Thought processes: coherent/organized  Hallucinations: no hallucinations present  Thought Content: no delusions  Abnormal Thoughts: The patient has no suicidal thoughts and no homicidal thoughts  Orientation: The patient is oriented to person, place and time  Recent and Remote Memory: short term memory intact and long term memory intact  Attention Span And Concentration: concentration intact and concentration impaired  Insight: Limited insight  Judgment: Her judgment was limited  Muscle Strength And Tone  Muscle strength and tone were normal  Normal gait and station  Language: no difficulty naming common objects, no difficulty repeating a phrase and no difficulty writing a sentence  Fund of knowledge: Patient displays adequate knowledge of current events, adequate fund of knowledge regarding past history and adequate fund of knowledge regarding vocabulary  The patient is experiencing no localized pain  On a scale of 0 - 10 the pain severity is a 0  Treatment Recommendations: Continue current medications as ordered  Risks, Benefits And Possible Side Effects Of Medications: Risks, benefits, and possible side effects of medications explained to patient and patient verbalizes understanding  She reports normal appetite, normal energy level, no weight change and normal number of sleep hours  I evaluate the patient because she expressed hallucinations, but after she describe the hallucinations to me , they are hypnopompic hallucinations  She denies any commands or having any other type of hallucinations  Patient denies any other issue  Assessment    1  FORTINO (generalized anxiety disorder) (300 02) (F41 1)   2   Severe episode of recurrent major depressive disorder, without psychotic features   (296 33) (F33 2)    Review of Systems    Constitutional: No fever, no chills, feels well, no tiredness, no recent weight gain or loss  Cardiovascular: no complaints of slow or fast heart rate, no chest pain, no palpitations  Respiratory: no complaints of shortness of breath, no wheezing, no dyspnea on exertion  Gastrointestinal: no complaints of abdominal pain, no constipation, no nausea, no diarrhea, no vomiting  Genitourinary: no complaints of dysuria, no incontinence, no pelvic pain, no urinary frequency  Musculoskeletal: no complaints of arthralgia, no myalgias, no limb pain, no joint stiffness  Integumentary: no complaints of skin rash, no itching, no dry skin  Neurological: no complaints of headache, no confusion, no numbness, no dizziness  Active Problems    1  ADHD, predominantly inattentive type (314 00) (F90 0)   2  Allergic rhinitis (477 9) (J30 9)   3  Anxiety and depression (300 00,311) (F41 9,F32 9)   4  Benign essential hypertension (401 1) (I10)   5  BMI 31 0-31 9,adult (V85 31) (Z68 31)   6  Cervicalgia (723 1) (M54 2)   7  Constipation (564 00) (K59 00)   8  Dysfunction of eustachian tube, unspecified laterality (381 81) (H69 80)   9  Encounter for screening mammogram for malignant neoplasm of breast (V76 12)   (Z12 31)   10  FORTINO (generalized anxiety disorder) (300 02) (F41 1)   11  Genital herpes simplex (054 10) (A60 00)   12  Headache (784 0) (R51)   13  Hip pain, unspecified laterality   14  Hypokalemia (276 8) (E87 6)   15  Lumbago (724 2) (M54 5)   16  Lyme disease (088 81) (A69 20)   17  Motion sickness (994 6) (T75 3XXA)   18  Obesity, Class I, BMI 30-34 9 (278 00) (E66 9)   19  Other muscle spasm (728 85) (M62 838)   20  Severe episode of recurrent major depressive disorder, without psychotic features    (296 33) (F33 2)   21  Tachycardia (785 0) (R00 0)   22  Thyroid disorder (246 9) (E07 9)   23   Well adult on routine health check (V70 0) (Z00 00)    Past Medical History    1  History of Acute bronchitis (466 0) (J20 9)   2  History of Acute pharyngitis (462) (J02 9)   3  History of Acute sinusitis (461 9) (J01 90)   4  History of Acute sinusitis (461 9) (J01 90)   5  History of Acute tonsillitis (463) (J03 90)   6  History of Acute upper respiratory infection (465 9) (J06 9)   7  History of Acute upper respiratory infection (465 9) (J06 9)   8  History of Acute UTI (599 0) (N39 0)   9  History of Cervicalgia (723 1) (M54 2)   10  History of acute pharyngitis (V12 69) (Z87 09)   11  History of migraine (V12 49) (Z86 69)   12  History of Nonallopathic lesion (739 9) (M99 9)   13  History of Palpitations (785 1) (R00 2)   14  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)   15  History of Shoulder joint pain, unspecified laterality   16  Urinary tract infection (599 0) (N39 0)    The active problems and past medical history were reviewed and updated today  Allergies    1  Percocet TABS    Current Meds   1  Albuterol 90 MCG/ACT AERS; INHALE 1 TO 2 PUFFS EVERY 4 TO 6 HOURS AS   NEEDED; Therapy: (Recorded:53Tuv5495) to Recorded   2  BuPROPion HCl ER (XL) 300 MG Oral Tablet Extended Release 24 Hour (Wellbutrin XL);   TAKE 1 TABLET Daily thity tablets; Therapy: 60QEI3701 to (Evaluate:23Jun2016)  Requested for: 73PYB6930; Last   Rx:48Pmu1932 Ordered   3  Griselda 0 35 MG Oral Tablet; TAKE 1 TABLET DAILY; Therapy: 24XWV7183 to Recorded   4  Flonase 50 MCG/ACT SUSP (Fluticasone Propionate); USE 1 SPRAY IN EACH NOSTRIL   ONCE DAILY; Therapy: (Recorded:92Lar9653) to Recorded   5  HydrOXYzine HCl - 25 MG Oral Tablet; TAKE 1 TO 2 TABLETS AT BEDTIME; Last   Rx:82Gwe5527 Ordered   6  Metoprolol Succinate ER 50 MG Oral Tablet Extended Release 24 Hour; TAKE 1 TABLET   DAILY; Therapy: 41CJR6784 to (Evaluate:16Oct2016)  Requested for: 32NIZ2043; Last   Rx:19Apr2016 Ordered   7  Multivitamins TABS; 1 Every Day;    Therapy: 01KUY0487 to  Requested for: 86DBQ8924 Recorded   8  Pristiq 100 MG Oral Tablet Extended Release 24 Hour; Take 1 tablet daily; Therapy: 26EFE6497 to  Requested for: 30YNZ7577 Recorded   9  Vyvanse 50 MG Oral Capsule; 1 capsule at 2:00pm;   Therapy: 43JHI3281 to  Requested for: 04GTP3601 Recorded   10  Vyvanse 60 MG Oral Capsule; take 1 capsule daily; Therapy: (Recorded:64Fmu0817) to Recorded   11  Zaleplon 10 MG Oral Capsule; TAKE 1 TO 2 CAPSULES AT BEDTIME AS NEEDED; Last    ME:11AGO2557 Ordered    The medication list was reviewed and updated today  Family Psych History  Son    1  Family history of attention deficit hyperactivity disorder (ADHD) (V17 0) (Z81 8)  Sister    2  Family history of Bipolar 1 disorder  Family History    3  Family history of Asbestosis   4  Family history of Cancer   5  Family history of Diabetes Mellitus (V18 0)   6  Family history of Emphysema   7  Family history of Glaucoma   8  Family history of Heart Disease (V17 49)   9  Family history of Hypertension (V17 49)   10  Family history of Mixed Hyperlipoproteinemia    The family history was reviewed and updated today  Social History    · College graduate   ·    · Employed   · Never A Smoker   · No history of alcohol use  The social history was reviewed and updated today  End of Encounter Meds    1  Vyvanse 50 MG Oral Capsule; 1 capsule at 2:00pm;   Therapy: 56YTD5418 to  Requested for: 66WBQ2194 Recorded   2  Vyvanse 60 MG Oral Capsule; take 1 capsule daily; Therapy: (Recorded:63Glr9920) to Recorded    3  Albuterol 90 MCG/ACT AERS; INHALE 1 TO 2 PUFFS EVERY 4 TO 6 HOURS AS   NEEDED; Therapy: (Recorded:77Fuq8014) to Recorded   4  Flonase 50 MCG/ACT SUSP (Fluticasone Propionate); USE 1 SPRAY IN EACH NOSTRIL   ONCE DAILY; Therapy: (Recorded:60Jqb8884) to Recorded    5  Metoprolol Succinate ER 50 MG Oral Tablet Extended Release 24 Hour; TAKE 1 TABLET   DAILY;    Therapy: 96BRM7928 to (Evaluate:88Asr5073)  Requested for: 63LTF2925; Last   Rx:19Apr2016 Ordered    6  HydrOXYzine HCl - 25 MG Oral Tablet; TAKE 1 TO 2 TABLETS AT BEDTIME; Last   Rx:08Gnj0103 Ordered    7  BuPROPion HCl ER (XL) 300 MG Oral Tablet Extended Release 24 Hour (Wellbutrin XL);   TAKE 1 TABLET Daily thity tablets; Therapy: 30FRM0219 to (Evaluate:23Jun2016)  Requested for: 73AYG7511; Last   Rx:05Rhf2031 Ordered   8  Pristiq 100 MG Oral Tablet Extended Release 24 Hour; Take 1 tablet daily; Therapy: 45JAV2397 to  Requested for: 47FSJ9037 Recorded   9  Zaleplon 10 MG Oral Capsule; TAKE 1 TO 2 CAPSULES AT BEDTIME AS NEEDED; Last   Rx:24May2016 Ordered    10  Griselda 0 35 MG Oral Tablet; TAKE 1 TABLET DAILY; Therapy: 15QLG7132 to Recorded   11  Multivitamins TABS; 1 Every Day; Therapy: 35TDU3459 to  Requested for: 90RXH1498 Recorded    Future Appointments    Date/Time Provider Specialty Site   06/09/2016 10:30 AM GEORGETTE Christensen   Psychiatry [facility]   07/14/2016 11:00 AM Paulette Espinoza [place]     Signatures   Electronically signed by : GEORGETTE Melvin ; Jun 8 2016 10:32AM EST                       (Author)

## 2018-01-15 NOTE — PSYCH
History of Present Illness  [facility] Clinical Progress Note [facility]:   Specialized Services Documentation - Therapist must complete separate progress note for each specific clinical activity in which the client participated during the day  (234) Group Psychotherapy: [phone]) Neena participated in psychotherapy group dealing with themes of bottled up emotions and the harmful effects on the body and mind, as well as perceptions of others' lack of caring  Neena discussed growing up in an environment where emotions were never expressed, and that her father only showed her affection when he was drunk  She said that she was forced to give him a hug when he wanted one while he was drunk and had no control as to whether she could say no to a hug, even though she felt violated because he was drunk and she felt it was affection "for the wrong reasons " She discussed doing things differently now with her own children, ensuring that they know that if they do not want to hug someone, they do not have to  She also talked about feeling very out of control of her emotions, and feels afraid to let herself cry because when she does allow herself to cry lately, she feels it gets too far and she panics and cannot breathe  Jaime Dhaliwal was receptive to feedback about taking time to express her emotions at appropriate times to release some of the pressure so that things do not build up to the point that she feels so out of control  She offered support and feedback to peers, and made moderate progress toward goals  Continue group therapy to allow patient to continue to explore stressors and healthier ways of coping  Treatment Plan Problem(s): 1 1, 1,2  Angela Jeter MSW, LSW     (042) Group Psychotherapy: [id number] Jaime Dhaliwal participated in wellness group focused on identifying physical effects of anger and exploration of healthy ways to address anger functionally so it does not create negative physical or emotional symptoms   Pt shared that does feel physical effects of anger in elevated blood pressure and related how she was prescribed blood pressure medications when going through a time of stress and anger  She related to peers how her blood pressure was reduced and she was taken off medication when she was inpatient as she did not have the stressors or triggers for anger on the unit  Pt made good progress toward goal  Continue to provide daily wellness group to educate patient on effective ways to handle anger and decrease unhealthy symptoms from improperly managed anger  Treatment Plan Problem(s): 1 1,1 2  Jose Lawton RN       (958) Education Therapy Goals set - identify a special quiet place and begin to put together a "relaxation box"    Treatment Plan Problem(s): 1 2  Education Therapy Time - 0900 - 0930 Previous goal was met  Readiness to Learning:  She is receptive to learning  There are  no barriers to learning  Learning Assessment Time - 1330 - 1400   Education completed on  illness and wellness tools  The teaching method was  verbal and written  Shared area of learning: Yes  Madonna Nageotte MSW, LSW     [phone] Riky Messina actively shared in [facility] group focused on assertiveness  Exercise explored communication styles and value of assertiveness as discussion focused on ways to increase assertiveness  She shared an increased awareness of her tendency to be passive aggressive and that in the end this style does not âhelpâ her  She used work examples in which she feels unsupported  She continues to participate yet needs redirection to stay on task and listen versus just continue to ask questions  Group discussed impact on treatment and ways to increase assertive behavior in getting needs met  Some effort noted toward goal  Continue AT to explore wellness strategies and encourage personal practice  Treatment Plan Problem(s): 1 1, 1 5   ROGERS BargerBC       Case Management Note:   [id number] Neena met with PREM today to discuss progress in Program  and D/C plan  Pt states she is feeling that [facility] is helpful for her and pt reports decreased anxiety and depression but still reporting lack of impulse control  Yvonne Luis has been working on [employer] successfully especially on triggers and forming "habit" of controlling impulses especially to spend money  Pt has [facility] begin post [facility] D/C  Appointment made with Jeananne Pallas on July 14, 2016 at 11:00 AM  Lori Nolan was given to Yvonne Luis to complete and bring to this appointment  Patient had also given [employer] STD paperwork (2 forms) for completion  Neena also brought in a revised list of medication dose of Vyvanse she received at her last visit with Dr Arlette Borja on 5/31/2016  This list was given to Dr Ulysses Estes for review  Vyvanse has been decreased to 60 mg daily  Pt states she discussed with Dr Gila Sung that 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  Pt requested to be excused from [facility] tomorrow to accompany her fiance to his chemotherapy at [facility] stated that she will speak with a  at the cancer program there regarding support for her fiance and for herself  Pt discussed that she lacks supports and finds she uses her ex- "too much" as a support  Pt was encouraged to also attend, while at [employer], Depression Support Group and DBSA support group to add to her supports as this is one of her treatment goals  Handouts were given and explained for support groups  TREATMENT SESSION NUMBER: 8   Current suicide risk is low  Medications changed/added/denied: Pt now taking Vyvance 60 mg in AM only  Afternoon dose has been D/C'd  Yajaira Matthew, NUZHAT      Active Problems    1  ADHD, predominantly inattentive type (314 00) (F90 0)   2  Allergic rhinitis (477 9) (J30 9)   3  Anxiety and depression (300 00,311) (F41 9,F32 9)   4   Benign essential hypertension (401 1) (I10)   5  BMI 31 0-31 9,adult (V85 31) (Z68 31)   6  Cervicalgia (723 1) (M54 2)   7  Constipation (564 00) (K59 00)   8  Dysfunction of eustachian tube, unspecified laterality (381 81) (H69 80)   9  Encounter for screening mammogram for malignant neoplasm of breast (V76 12)   (Z12 31)   10  FORTINO (generalized anxiety disorder) (300 02) (F41 1)   11  Genital herpes simplex (054 10) (A60 00)   12  Headache (784 0) (R51)   13  Hip pain, unspecified laterality   14  Hypokalemia (276 8) (E87 6)   15  Lumbago (724 2) (M54 5)   16  Lyme disease (088 81) (A69 20)   17  Motion sickness (994 6) (T75 3XXA)   18  Obesity, Class I, BMI 30-34 9 (278 00) (E66 9)   19  Other muscle spasm (728 85) (M62 838)   20  Severe episode of recurrent major depressive disorder, without psychotic features    (296 33) (F33 2)   21  Tachycardia (785 0) (R00 0)   22  Thyroid disorder (246 9) (E07 9)   23  Well adult on routine health check (V70 0) (Z00 00)    Past Medical History    1  History of Acute bronchitis (466 0) (J20 9)   2  History of Acute pharyngitis (462) (J02 9)   3  History of Acute sinusitis (461 9) (J01 90)   4  History of Acute sinusitis (461 9) (J01 90)   5  History of Acute tonsillitis (463) (J03 90)   6  History of Acute upper respiratory infection (465 9) (J06 9)   7  History of Acute upper respiratory infection (465 9) (J06 9)   8  History of Acute UTI (599 0) (N39 0)   9  History of Cervicalgia (723 1) (M54 2)   10  History of acute pharyngitis (V12 69) (Z87 09)   11  History of migraine (V12 49) (Z86 69)   12  History of Nonallopathic lesion (739 9) (M99 9)   13  History of Palpitations (785 1) (R00 2)   14  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)   15  History of Shoulder joint pain, unspecified laterality   16  Urinary tract infection (599 0) (N39 0)    Allergies    1  Percocet TABS    Current Meds   1  Albuterol 90 MCG/ACT AERS; INHALE 1 TO 2 PUFFS EVERY 4 TO 6 HOURS AS   NEEDED;    Therapy: (Recorded:20May2016) to Recorded   2  BuPROPion HCl ER (XL) 300 MG Oral Tablet Extended Release 24 Hour; TAKE 1   TABLET Daily thity tablets; Therapy: 11BLV6613 to (Evaluate:23Jun2016)  Requested for: 46KOR2697; Last   Rx:24May2016 Ordered   3  Griselda 0 35 MG Oral Tablet; TAKE 1 TABLET DAILY; Therapy: 69RCR4561 to Recorded   4  Flonase 50 MCG/ACT SUSP; USE 1 SPRAY IN EACH NOSTRIL ONCE DAILY; Therapy: (Recorded:67Lvi6839) to Recorded   5  HydrOXYzine HCl - 25 MG Oral Tablet; TAKE 1 TO 2 TABLETS AT BEDTIME; Last   Rx:62Gpv3317 Ordered   6  Metoprolol Succinate ER 50 MG Oral Tablet Extended Release 24 Hour; TAKE 1 TABLET   DAILY; Therapy: 78WLE0209 to (Evaluate:16Oct2016)  Requested for: 80CPY6526; Last   Rx:13Yqp6149 Ordered   7  Multivitamins TABS; 1 Every Day; Therapy: 61NOK6360 to  Requested for: 83UAX9672 Recorded   8  Pristiq 100 MG Oral Tablet Extended Release 24 Hour; Take 1 tablet daily; Therapy: 46ZIT7718 to  Requested for: 93DGK0346 Recorded   9  Vyvanse 50 MG Oral Capsule; 1 capsule at 2:00pm;   Therapy: 19CBX4581 to  Requested for: 36KAU8013 Recorded   10  Vyvanse 60 MG Oral Capsule; take 1 capsule daily; Therapy: (Recorded:95Vkj8354) to Recorded   11  Zaleplon 10 MG Oral Capsule; TAKE 1 TO 2 CAPSULES AT BEDTIME AS NEEDED; Last    WD:49YLK1288 Ordered    Family Psych History  Son    1  Family history of attention deficit hyperactivity disorder (ADHD) (V17 0) (Z81 8)  Sister    2  Family history of Bipolar 1 disorder  Family History    3  Family history of Asbestosis   4  Family history of Cancer   5  Family history of Diabetes Mellitus (V18 0)   6  Family history of Emphysema   7  Family history of Glaucoma   8  Family history of Heart Disease (V17 49)   9  Family history of Hypertension (V17 49)   10   Family history of Mixed Hyperlipoproteinemia    Social History    · College graduate   ·    · Employed   · Never A Smoker   · No history of alcohol use    Future Appointments    Date/Time Provider Specialty Site   06/03/2016 10:30 AM Vinnie Muro MD Psychiatry [facility]     Signatures   Electronically signed by : JONATHAN Steen; Jun 2 2016 11:45AM EST                       (Author)    Electronically signed by : FAYE Núñez; Jun 2 2016  1:10PM EST                       (Author)    Electronically signed by : Yajaira Matthew RN; Jun 2 2016  2:43PM EST                       (Author)    Electronically signed by : Yajaira Matthew RN; Jun 2 2016  2:48PM EST                       (Author)    Electronically signed by : JONATHAN Steen; Jun 2 2016  2:49PM EST                       (Author)    Electronically signed by : JONATHAN Steen; Jun 2 2016  3:18PM EST                       (Author)

## 2018-01-15 NOTE — PSYCH
History of Present Illness  [facility] Clinical Progress Note [facility]:   Specialized Services Documentation - Therapist must complete separate progress note for each specific clinical activity in which the client participated during the day  ( ) Other 00 [id number] with Eladia Saldivar at [facility] - 1 day extension granted due to missed day - [phone]  Review scheduled for 1030 on 9/7/16  Asmita P[facility]     Case Management Note: Individual Case Management visit not provided today  1024 Received call from Navid Ni this morning that she was not feeling well and would not make it in  LM for Neena on her voice mail at [address] Asmita Pino [facility]      Active Problems    1  ADHD, predominantly inattentive type (314 00) (F90 0)   2  Allergic rhinitis (477 9) (J30 9)   3  Anxiety and depression (300 00,311) (F41 9,F32 9)   4  Benign essential hypertension (401 1) (I10)   5  BMI 31 0-31 9,adult (V85 31) (Z68 31)   6  Cervicalgia (723 1) (M54 2)   7  Constipation (564 00) (K59 00)   8  Dysfunction of eustachian tube, unspecified laterality (381 81) (H69 80)   9  Encounter for screening mammogram for malignant neoplasm of breast (V76 12)   (Z12 31)   10  FORTINO (generalized anxiety disorder) (300 02) (F41 1)   11  Genital herpes simplex (054 10) (A60 00)   12  Headache (784 0) (R51)   13  Hip pain, unspecified laterality   14  Hypokalemia (276 8) (E87 6)   15  Lumbago (724 2) (M54 5)   16  Lyme disease (088 81) (A69 20)   17  Motion sickness (994 6) (T75 3XXA)   18  Obesity, Class I, BMI 30-34 9 (278 00) (E66 9)   19  Other muscle spasm (728 85) (M62 838)   20  Severe episode of recurrent major depressive disorder, without psychotic features    (296 33) (F33 2)   21  Tachycardia (785 0) (R00 0)   22  Thyroid disorder (246 9) (E07 9)   23  Well adult on routine health check (V70 0) (Z00 00)    Past Medical History    1  History of Acute bronchitis (466 0) (J20 9)   2   History of Acute pharyngitis (462) (J02 9) 3  History of Acute sinusitis (461 9) (J01 90)   4  History of Acute sinusitis (461 9) (J01 90)   5  History of Acute tonsillitis (463) (J03 90)   6  History of Acute upper respiratory infection (465 9) (J06 9)   7  History of Acute upper respiratory infection (465 9) (J06 9)   8  History of Acute UTI (599 0) (N39 0)   9  History of Cervicalgia (723 1) (M54 2)   10  Denied: History of Head trauma   11  History of acute pharyngitis (V12 69) (Z87 09)   12  History of migraine (V12 49) (Z86 69)   13  History of Nonallopathic lesion (739 9) (M99 9)   14  History of Palpitations (785 1) (R00 2)   15  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)   16  Denied: History of Seizure   17  History of Shoulder joint pain, unspecified laterality   18  Urinary tract infection (599 0) (N39 0)    Allergies    1  Percocet TABS    Current Meds   1  Albuterol 90 MCG/ACT AERS; INHALE 1 TO 2 PUFFS EVERY 4 TO 6 HOURS AS   NEEDED; Therapy: (Recorded:20May2016) to Recorded   2  BuPROPion HCl ER (XL) 300 MG Oral Tablet Extended Release 24 Hour; TAKE 1   TABLET Daily thity tablets; Therapy: 78NBR1970 to (Evaluate:23Jun2016)  Requested for: 52XNA0366; Last   Rx:24May2016 Ordered   3  CloNIDine HCl - 0 1 MG Oral Tablet; Take one tablet PO  AT 6:00 PM and 1 tablet PO AT   9:00 pm;   Therapy: (Recorded:76Qsa5760) to Recorded   4  Griselda 0 35 MG Oral Tablet; TAKE 1 TABLET DAILY; Therapy: 00ERG9399 to Recorded   5  Flonase 50 MCG/ACT SUSP; USE 1 SPRAY IN EACH NOSTRIL ONCE DAILY; Therapy: (Recorded:20May2016) to Recorded   6  Gabapentin 300 MG Oral Capsule; TAKE 3 CAPSULE Bedtime; Therapy: (Recorded:20Zgl4032) to Recorded   7  HydrOXYzine HCl - 25 MG Oral Tablet; TAKE 1 TO 2 TABLETS AT BEDTIME; Last   Rx:24May2016 Ordered   8  Metoprolol Succinate ER 50 MG Oral Tablet Extended Release 24 Hour; TAKE 1 TABLET   DAILY; Therapy: 28MZL8186 to (Evaluate:16Oct2016)  Requested for: 79VFA1178; Last   Rx:19Apr2016 Ordered   9   Multivitamins TABS; 1 Every Day; Therapy: 45IPC7720 to  Requested for: 36IYV8082 Recorded   10  Pristiq 100 MG Oral Tablet Extended Release 24 Hour; Take 1 tablet daily; Therapy: 91LWE0564 to  Requested for: 57EEJ2258 Recorded   11  Vyvanse 50 MG Oral Capsule; 1 capsule at 2:00pm;    Therapy: 70DLT6690 to  Requested for: 21XYH9195 Recorded   12  Vyvanse 60 MG Oral Capsule; take 1 capsule daily; Therapy: (Recorded:82Vjx0502) to Recorded   13  Zaleplon 10 MG Oral Capsule; TAKE 1 TO 2 CAPSULES AT BEDTIME AS NEEDED; Last    LB:91YVB0168 Ordered    Family Psych History  Son    1  Family history of attention deficit hyperactivity disorder (ADHD) (V17 0) (Z81 8)  Sister    2  Family history of Bipolar 1 disorder  Family History    3  Family history of Asbestosis   4  Family history of Cancer   5  Family history of Diabetes Mellitus (V18 0)   6  Family history of Emphysema   7  Family history of Glaucoma   8  Family history of Heart Disease (V17 49)   9  Family history of Hypertension (V17 49)   10  Family history of Mixed Hyperlipoproteinemia    Social History    · College graduate   ·    · Employed   · Never A Smoker   · No history of alcohol use    Future Appointments    Date/Time Provider Specialty Site   09/07/2016 11:45 AM GEORGETTE Childs  Psychiatry [facility] PARTIAL HOSPITALIZATION   09/08/2016 11:15 AM GEORGETTE Childs  Psychiatry [facility] PARTIAL HOSPITALIZATION   09/09/2016 11:30 AM GEORGETTE Childs   Psychiatry [facility] PARTIAL HOSPITALIZATION   09/20/2016 11:00 AM Prudence Hankins [address] [facility]     Signatures   Electronically signed by : FAYE So; Sep  6 2016  3:22PM EST                       (Author)

## 2018-01-15 NOTE — DISCHARGE SUMMARY
Discharge Summary  [facility] Discharge Summary Sue Rodriguez:   Admission Date: 5/20/2016  Patient was referred by Daniela Cedeno   Discharge Date: 6/24/2016  Diagnosis: Axis I: FORTINO (generalized anxiety disorder) (F41 1), Severe episode of recurrent major depressive disorder, withouth psychotic features (F33 2)  Treating Physician: Dr Adali Navarrete MD    Treatment Complications: Patient no showed and no called several days of Program and this was addressed with  each time  Patient was also challenged by focusing on her own issues rather than concerns about her former fiances' health  Presenting Problem: Ms Keith Nassar was referred to [facility] from inpatient unit of [address] where she was admitted from 5/9-5/19/2016 for severe depression and anxiety for prior two weeks  Patient stated that she was feeling depressed and anxious, hopeless, having multiple panic episodes, missing work, crying frequently, experiencing sleep disturbances and poor concentration  Patient identified that she felt more depressed after her fiancee moved out of the house they shared  Ms Keith Nassar is denying suicidal or homicidal ideation, plan or intent  Patient also denies auditory or visual hallucinations, manic or psychotic symptoms  Course of treatment includes group counseling, pharmacotherapy, individual case management, allied therapy, psychoeducation and psychiatric evaluation  Treatment Progress: Ms Keith Nassar attended [facility] 1452 treatment days and 4 Intensive Outpatient treatment days at [facility]  During her stay in Program, Ms Keith Nassar worked on goals of learning and practicing strategies to decrease isolating episodes, hanging on to past relationships (so as not to be alone) with former  and former fiance, even though they have moved on, curbing impulsive shopping behaviors when depressed and designing a routine that will increase her productivity and decrease symptoms of depression  Ms Colten Santillan had decided, upon arrival at [facility], that she would request her ex- move into the family home with their children and care for them while she moved out into an apartment of her own in order to have "time and space to concentrate on getting better " Patient carried through with this plan but found it very difficult to be alone and frequently felt lonely and, rather than using the time she claimed for herself to "heal", patient reported that she would be distracted by her former fiances' health problems and other distractors  Ms Colten Santillan was able to identify that she had coped well, in the past after her divorce and saw this as a strength she could tap into in the present  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, 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 and was exploring alternative careers that may offer more personal satisfaction and growth at this time in her life  Patient identified at discharge that the "no show, no calls" were about her fear and difficulty in moving on in relationships  Patient designed a daily routine for structure of time and goal achievement that she shared with this CM and she discussed learning how to relax herself with cognitive and behavioral strategies such as reading or calling a support person or attending a support group that she chose to practice to decrease symptoms of depression  Patient denied suicidal or homicidal ideation upon discharge as well as any auditory or visual hallucinations and no manic episodes were reported  Aftercare recommendations include  See Below  Discharge Medications include: See Below  Discharge [address] Discharge Instructions [facility] 14:     Address: [address] [facility], [place], [address] [facility]       Diagnosis: Severe episode of recurrent major depressive disorder, without psychotic features (F33 2), FORTINO (generalized anxiety disorder) (F41 1)  Allergies (Drug/Food): Percocet TABS  Activity: you may not return to work until approved by OP psychiatrist, but you may drive  Diet: Regular  Vaccines: If you received a vaccine, please notify your family physician on your next visit  Pneumococcal vaccine not given, Influenza vaccine not given  For more information, please call [phone]  Follow-up appointments/Referrals: [facility] [address], [place], [address]  Patient will make follow up appointment  Outpatient Therapist - Moose Orr, [facility] Anatoly Alexander [place] [address] [facility], [address] - Phone [phone] July 14, 2016 at [address] given to patient and she will bring to this appointment with Ms Espinoza     ICM/CTT: Support group handouts and information explained and given to patient on discharge  [facility]: [place] [phone]  Crisis Intervention (Emergency) [facility] Service: [facility] ([place]): [phone]  National Crisis Intervention Hotline: 9-438.763.6405  National Suicide Crisis Hotline: 9-622.689.2463           Patient/Rep Signature: __________________________________ Date/Time: ______________         Physician Signature: ____________________________________ Date/Time: ______________          Signature: ________________________________ Date/Time: ______________          Signatures   Electronically signed by : Yung Clark RN; Jun 20 2016  3:04PM EST                       (Author)    Electronically signed by : Yung Clark RN; Jun 23 2016  3:20PM EST                       (Author)    Electronically signed by : Yung Clark RN; Jun 23 2016  3:24PM EST                       (Author)    Electronically signed by : Yung Clark RN; Jun 29 2016  2:52PM EST                       (Author)    Electronically signed by : GEORGETTE Abdi ; Jun 30 2016 7:41AM EST                       (Author)
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
## 2018-01-15 NOTE — PSYCH
History of Present Illness  [facility] Clinical Progress Note [facility]:   Specialized Services Documentation - Therapist must complete separate progress note for each specific clinical activity in which the client participated during the day  (953) Group Psychotherapy: (9:30-10:30) Patient participated in psychotherapy group dealing with themes of loss, anger and trust  Justice Nim discussed difficulties trusting people and opening herself up to others, and expressed a desire to have a best "girl friend," but has never been able to have one because she does not trust people  She also provided supportive feedback to her peers  Some progress toward goals  Continue psychotherapy group to offer opportunity to explore stressors and new ways of coping  HARSHAL Holliday  Treatment Plan Problem(s): 1 1, 1 2      (358) Group Psychotherapy: 6464-4477 Neena participated in wellness group focused on positive problem solving  Pt shared that she needs to self-sooth when feeling frightened or overwhelmed by going to a "happy place or safe place in her home " Neena discussed with peers different ways she has soothed herself when traumatized including sitting in a closet as a child at her grandparent's home or going under the blankets on her bed  Peers responded with ways they self-sooth when feeling sad, depressed, overwhelmed and pt reflected that she felt "understood " Pt made moderate progress toward goal  Continue group to educate and provide opportunity for pt to practice healthy strategies to use to promote positive individual problem solving  Treatment Plan Problem(s): 1 1,1 2  Lilian Sánchez RN       (800) Education Therapy Goals set - set time aside to meditate; pray    Treatment Plan Problem(s): 1 1  Education Therapy Time - 0900 - 0930 Previous goal was met  Readiness to Learning:  She is receptive to learning  There are  no barriers to learning    Learning Assessment Time - 1330 - 1400   Education completed on illness and wellness tools  The teaching method was  verbal  Shared area of learning: Yes  FAYE Eubanks     (155) Allied Therapy 5754-2891 Neena actively shared in [facility] group focused on relaxation and self- care  Group explored variety of therapist led relaxation strategies and concept of self -care versus selfishness  She was able to identify barriers to self -care and a tendency to feel âselfishâ if she does things to help self versus help herself  Limit setting and relaxation as a way to increase self -care  She identified a way to improve her self-care as to carve out 15 minutes a day to meditate or pray  Group reinforced the necessity of self -care in wellness versus seeing this as a luxury  In summary, she stated she learned in group âI matterâ  Some progress voiced toward goal  Continue AT to engage in the development and practice of wellness tools  Treatment Plan Problem(s): 1 1  FAYE Eubanks     ( ) Other 4553 Review today with [facility] request for extended LOS  Review rescheduled due to pt's absence yesterday  Today is LCD of 4 days approved  Keith Nesbitt RN     Case Management Note:   [id number] CM met with Neena to discuss her question (written on CM Update) that she wants to know how long she will continue to experience auditory and visual hallucinations    Pt stated that she has only been taking the correct (lower) dose of Wellbutrin 300 mg daily for the past two days  (Pt had not reduced dose, as previously ordered, and counseled by Dr Min Calderonrs from 450mg to 300 mg upon admission to [facility]  Savanna Irizarry had stated that she wanted to "use up the medication she had before I go down to 300 mg " Neena was encouraged to take medication as ordered   Pt also asked if she can receive a prescription of "something for anxiety " Pt stated that she had been given a prescription for Klonipin upon hospital D/C and was given Ativan PRN inpatient also Atarax and tolerated all these medications well  Pt states she has a supply of Klonipin at home but will discuss this request with [facility] psychiatrist    TREATMENT SESSION NUMBER: 4   Current suicide risk is low  Medications not changed/added/denied  Manuel Pressley RN      Active Problems    1  ADHD, predominantly inattentive type (314 00) (F90 0)   2  Allergic rhinitis (477 9) (J30 9)   3  Anxiety and depression (300 00,311) (F41 9,F32 9)   4  Benign essential hypertension (401 1) (I10)   5  BMI 31 0-31 9,adult (V85 31) (Z68 31)   6  Cervicalgia (723 1) (M54 2)   7  Constipation (564 00) (K59 00)   8  Dysfunction of eustachian tube, unspecified laterality (381 81) (H69 80)   9  Encounter for screening mammogram for malignant neoplasm of breast (V76 12)   (Z12 31)   10  FORTINO (generalized anxiety disorder) (300 02) (F41 1)   11  Genital herpes simplex (054 10) (A60 00)   12  Headache (784 0) (R51)   13  Hip pain, unspecified laterality   14  Hypokalemia (276 8) (E87 6)   15  Lumbago (724 2) (M54 5)   16  Lyme disease (088 81) (A69 20)   17  Motion sickness (994 6) (T75 3XXA)   18  Obesity, Class I, BMI 30-34 9 (278 00) (E66 9)   19  Other muscle spasm (728 85) (M62 838)   20  Severe episode of recurrent major depressive disorder, without psychotic features    (296 33) (F33 2)   21  Tachycardia (785 0) (R00 0)   22  Thyroid disorder (246 9) (E07 9)   23  Well adult on routine health check (V70 0) (Z00 00)    Past Medical History    1  History of Acute bronchitis (466 0) (J20 9)   2  History of Acute pharyngitis (462) (J02 9)   3  History of Acute sinusitis (461 9) (J01 90)   4  History of Acute sinusitis (461 9) (J01 90)   5  History of Acute tonsillitis (463) (J03 90)   6  History of Acute upper respiratory infection (465 9) (J06 9)   7  History of Acute upper respiratory infection (465 9) (J06 9)   8  History of Acute UTI (599 0) (N39 0)   9  History of Cervicalgia (723 1) (M54 2)   10   History of acute pharyngitis (V12 69) (Z87 09) 11  History of migraine (V12 49) (Z86 69)   12  History of Nonallopathic lesion (739 9) (M99 9)   13  History of Palpitations (785 1) (R00 2)   14  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)   15  History of Shoulder joint pain, unspecified laterality   16  Urinary tract infection (599 0) (N39 0)    Allergies    1  Percocet TABS    Current Meds   1  Albuterol 90 MCG/ACT AERS; INHALE 1 TO 2 PUFFS EVERY 4 TO 6 HOURS AS   NEEDED; Therapy: (Recorded:97Fwt6150) to Recorded   2  BuPROPion HCl ER (XL) 300 MG Oral Tablet Extended Release 24 Hour; TAKE 1   TABLET Daily thity tablets; Therapy: 89GAT4301 to (Evaluate:23Jun2016)  Requested for: 65MNJ6589; Last   Rx:24May2016 Ordered   3  Griselda 0 35 MG Oral Tablet; TAKE 1 TABLET DAILY; Therapy: 50UCH3006 to Recorded   4  Flonase 50 MCG/ACT Nasal Suspension; USE 1 SPRAY IN EACH NOSTRIL ONCE   DAILY; Therapy: (Recorded:33Cgk7946) to Recorded   5  HydrOXYzine HCl - 25 MG Oral Tablet; TAKE 1 TO 2 TABLETS AT BEDTIME; Last   Rx:24May2016 Ordered   6  Metoprolol Succinate ER 50 MG Oral Tablet Extended Release 24 Hour; TAKE 1 TABLET   DAILY; Therapy: 12EVX1259 to (Evaluate:16Oct2016)  Requested for: 73MLQ2560; Last   Rx:19Apr2016 Ordered   7  Multivitamins TABS; 1 Every Day; Therapy: 03ZGM4032 to  Requested for: 80DQD4476 Recorded   8  Pristiq 100 MG Oral Tablet Extended Release 24 Hour; Take 1 tablet daily; Therapy: 21APD0511 to  Requested for: 01LND5457 Recorded   9  Vyvanse 50 MG Oral Capsule; 1 capsule at 2:00pm;   Therapy: 91QJH4385 to  Requested for: 23PQN8170 Recorded   10  Vyvanse 60 MG Oral Capsule; take 1 capsule daily; Therapy: (Recorded:76Wrt5486) to Recorded   11  Zaleplon 10 MG Oral Capsule; TAKE 1 TO 2 CAPSULES AT BEDTIME AS NEEDED; Last    AS:00PIL9006 Ordered    Family Psych History  Son    1  Family history of attention deficit hyperactivity disorder (ADHD) (V17 0) (Z81 8)  Sister    2  Family history of Bipolar 1 disorder  Family History    3   Family history of Asbestosis   4  Family history of Cancer   5  Family history of Diabetes Mellitus (V18 0)   6  Family history of Emphysema   7  Family history of Glaucoma   8  Family history of Heart Disease (V17 49)   9  Family history of Hypertension (V17 49)   10  Family history of Mixed Hyperlipoproteinemia    Social History    · College graduate   ·    · Employed   · Never A Smoker   · No history of alcohol use    Future Appointments    Date/Time Provider Specialty Site   05/27/2016 10:00 AM GEORGETTE Loyd   [facility]     Signatures   Electronically signed by : Miracle Tong, ; May 26 2016 10:58AM EST                       (Author)    Electronically signed by : FAYE Downing; May 26 2016  2:32PM EST                       (Author)    Electronically signed by : Familia Crain RN; May 26 2016  3:05PM EST                       (Author)

## 2018-01-15 NOTE — PSYCH
History of Present Illness  [facility] Clinical Progress Note [facility]:   Specialized Services Documentation - Therapist must complete separate progress note for each specific clinical activity in which the client participated during the day  ([phone]) Group Psychotherapy: (9:30-10:30) Neena participated in psychotherapy group focused on self-esteem and personal responsibility  Neena identified work and her relationship as stressors, and commented that she talks about everyone else but not herself  This MSW encouraged Neena to push herself to talk about her own issues today, so Neena talked about how she has always had low self-esteem and that it affects how she relates to other people  She talked about her job and the fact that 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, and although 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 for 4 years, her request has not been granted  She discussed multiple complaints about her work and did not relate any of those problems to her own personal behaviors, such as being late for work or making mistakes  She listened to peer feedback about needing to take a step back and see things more objectively and taking responsibility for her own actions, although she seemed to be resistant and annoyed at this particular feedback  Minimal progress noted toward goals  Continue psychotherapy group to encourage Neena to explore stressors and healthy coping skills  Treatment Plan Problem(s): 1 1, 1 2  Neva Hebert MSW, LSW     (688) Group Psychotherapy: [id number]) [address] participated in group focussed on weekly wellness assessment with prompts  She shared her emotional goals and stated she had improved lot by being in this program but still needs to work on strategies to express her anger in healthy way  Peers offered variety of suggestions, which she remained receptive  She offered supportive feedback to peers  Some progress toward goals noted   Continue group on weekly wellness to offer opportunity to identify area of achievement and redefine future goals  Neil Membreno RN  Treatment Plan Problem(s): 1 1,1 2        (942) Education Therapy Goals set - be "present" with her children this weekend    Treatment Plan Problem(s): 1 6  Education Therapy Time - 0900 - 0930 Previous goal was not met  Readiness to Learning:  She is receptive to learning  There are  no barriers to learning  Learning Assessment Time - 1330 - 1400   Education completed on  illness and wellness tools  The teaching method was  verbal  Shared area of learning: Yes  FAYE Diaz     (672) Allied Therapy 2211-7400 Neena actively shared in [facility] group focused on support  She engaged in hand Knomee task exploring concepts of the recovery model and interaction between responsibility, advocacy, and support  She identified resources for support and ways she could support herself this weekend  Phillip Peck was able to identify types of support she could use over the weekend including her WRAP and positive affirmations  Group reinforced personal role in actively using the resources  Weekend support resources reviewed  Some progress toward goal  Continue AT to reinforce personal role in supporting self and obtaining positive support from others  Treatment Plan Problem(s): 1 4, 1 6  FAYE Diaz       Case Management Note:   IOP 6 (6030-6810) Met with Neena  She stated she had visitation with her children this weekend  She was looking forward to this, but worried about them all getting along  She stated she got up early today and took a walk along with yesterday she researched some volunteer options  She denied other issues or concerns, however she appeared somewhat disheveled  Reviewed treatment days for next week and supports for the weekend  TREATMENT SESSION NUMBER: 13   Current suicide risk is low  Medications not changed/added/denied  FAYE Diaz      Active Problems    1   ADHD, predominantly inattentive type (314 00) (F90 0)   2  Allergic rhinitis (477 9) (J30 9)   3  Anxiety and depression (300 00,311) (F41 9,F32 9)   4  Benign essential hypertension (401 1) (I10)   5  BMI 31 0-31 9,adult (V85 31) (Z68 31)   6  Cervicalgia (723 1) (M54 2)   7  Constipation (564 00) (K59 00)   8  Dysfunction of eustachian tube, unspecified laterality (381 81) (H69 80)   9  Encounter for screening mammogram for malignant neoplasm of breast (V76 12)   (Z12 31)   10  FORTINO (generalized anxiety disorder) (300 02) (F41 1)   11  Genital herpes simplex (054 10) (A60 00)   12  Headache (784 0) (R51)   13  Hip pain, unspecified laterality   14  Hypokalemia (276 8) (E87 6)   15  Lumbago (724 2) (M54 5)   16  Lyme disease (088 81) (A69 20)   17  Motion sickness (994 6) (T75 3XXA)   18  Obesity, Class I, BMI 30-34 9 (278 00) (E66 9)   19  Other muscle spasm (728 85) (M62 838)   20  Severe episode of recurrent major depressive disorder, without psychotic features    (296 33) (F33 2)   21  Tachycardia (785 0) (R00 0)   22  Thyroid disorder (246 9) (E07 9)   23  Well adult on routine health check (V70 0) (Z00 00)    Past Medical History    1  History of Acute bronchitis (466 0) (J20 9)   2  History of Acute pharyngitis (462) (J02 9)   3  History of Acute sinusitis (461 9) (J01 90)   4  History of Acute sinusitis (461 9) (J01 90)   5  History of Acute tonsillitis (463) (J03 90)   6  History of Acute upper respiratory infection (465 9) (J06 9)   7  History of Acute upper respiratory infection (465 9) (J06 9)   8  History of Acute UTI (599 0) (N39 0)   9  History of Cervicalgia (723 1) (M54 2)   10  Denied: History of Head trauma   11  History of acute pharyngitis (V12 69) (Z87 09)   12  History of migraine (V12 49) (Z86 69)   13  History of Nonallopathic lesion (739 9) (M99 9)   14  History of Palpitations (785 1) (R00 2)   15  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)   16  Denied: History of Seizure   17   History of Shoulder joint pain, unspecified laterality   18  Urinary tract infection (599 0) (N39 0)    Allergies    1  Percocet TABS    Current Meds   1  Albuterol 90 MCG/ACT AERS; INHALE 1 TO 2 PUFFS EVERY 4 TO 6 HOURS AS   NEEDED; Therapy: (Recorded:27Gqq5882) to Recorded   2  BuPROPion HCl ER (XL) 300 MG Oral Tablet Extended Release 24 Hour; TAKE 1   TABLET Daily thity tablets; Therapy: 83UQJ9839 to (Evaluate:23Jun2016)  Requested for: 15TJC2148; Last   Rx:38Xol4914 Ordered   3  CloNIDine HCl - 0 1 MG Oral Tablet; Take one tablet PO  AT 6:00 PM and 1 tablet PO AT   9:00 pm;   Therapy: (Recorded:51Tmw0058) to Recorded   4  Griselda 0 35 MG Oral Tablet; TAKE 1 TABLET DAILY; Therapy: 37PHM0226 to Recorded   5  Flonase 50 MCG/ACT SUSP; USE 1 SPRAY IN EACH NOSTRIL ONCE DAILY; Therapy: (Recorded:33Peb4864) to Recorded   6  Gabapentin 300 MG Oral Capsule; TAKE 3 CAPSULE Bedtime; Last Rx:93Cpn7688   Ordered   7  HydrOXYzine HCl - 50 MG Oral Tablet; TAKE 2 TABLET Bedtime; Last Rx:49Wiv6919   Ordered   8  Metoprolol Succinate ER 50 MG Oral Tablet Extended Release 24 Hour; TAKE 1 TABLET   DAILY; Therapy: 05KYL9648 to (Evaluate:16Oct2016)  Requested for: 13OZT7215; Last   Rx:12Zpr7654 Ordered   9  Multivitamins TABS; 1 Every Day; Therapy: 53KMU1779 to  Requested for: 37SRX5293 Recorded   10  Pristiq 100 MG Oral Tablet Extended Release 24 Hour; Take 1 tablet daily; Therapy: 35MFT9822 to  Requested for: 43RJZ6483 Recorded   11  Vyvanse 50 MG Oral Capsule; 1 capsule at 2:00pm;    Therapy: 02MBG0826 to  Requested for: 50OOY3442 Recorded   12  Vyvanse 60 MG Oral Capsule; take 1 capsule daily; Therapy: (Recorded:77Woi2200) to Recorded   13  Zaleplon 10 MG Oral Capsule; TAKE 1 TO 2 CAPSULES AT BEDTIME AS NEEDED; Last    Rx:40Rfe5076 Ordered    Family Psych History  Son    1  Family history of attention deficit hyperactivity disorder (ADHD) (V17 0) (Z81 8)  Sister    2  Family history of Bipolar 1 disorder  Family History    3  Family history of Asbestosis   4  Family history of Cancer   5  Family history of Diabetes Mellitus (V18 0)   6  Family history of Emphysema   7  Family history of Glaucoma   8  Family history of Heart Disease (V17 49)   9  Family history of Hypertension (V17 49)   10   Family history of Mixed Hyperlipoproteinemia    Social History    · College graduate   ·    · Employed   · Never A Smoker   · No history of alcohol use    Signatures   Electronically signed by : FAYE Gray; Sep 23 2016  2:27PM EST                       (Author)    Electronically signed by : JONATHAN Ramos; Sep 23 2016  2:35PM EST                       (Author)    Electronically signed by : Sebastian Woods RN; Sep 23 2016  3:02PM EST                       (Author)    Electronically signed by : FAYE Gray; Sep 28 2016  1:24PM EST                       (Author)

## 2018-01-15 NOTE — PSYCH
History of Present Illness  [facility] Clinical Progress Note  [facility]:   Specialized Services Documentation - Therapist must complete separate progress note for each specific clinical activity in which the client participated during the day  (156) Group Psychotherapy: (9:30-10:30) Neena participated in psychotherapy group focused on identifying unmet needs and coping with them in healthier ways  Neena identified considering changing her housing to a bigger place that can fit her children better as a stressor  Aislinn Bell participated in group discussion about shopping to fill a void and the benefits and consequences of doing so  Group discussed different ways to recognize what need is being met by shopping and how to redirect thoughts and behaviors into healthier coping skills  Moderate progress noted toward goals  Continue psychotherapy group to encourage Neena to explore stressors and healthy ways of coping  Treatment Plan Problem(s): 1 1, 1 2  Garima Stubbs MSW, LSW     (461) Group Psychotherapy: [id number] Neena participated in wellness group focused on topic of learning, and practicing, not to personalize rejection  By not personalizing rejection or errors patient is learning to practice cognitive and behavioral strategies to decrease low self-esteem and to not feed negativity fueling anxiety and depression  Neena shared that she struggles with rejection, especially with her fiance  She shared an example of a plan that she and her fiance made to spend the day together and paint a room in her house  She had been looking forward to this and received a call from him unexpectedly that he had visitation with his children and would not be able to meet and help her and plans were cancelled   Neena described "feeling numb and proceeding to wander around the store feeling lost then returning home and laying around " She stated she was angry and hurt but mostly felt that she was "not as important as his children" even though she understood the situation, she took the disappointment hard and "it ruined my whole day " Neena was able to discuss alternative to reacting to rejections, real or perceived, in healthier ways and tips for avoiding personalizing rejection were reviewed for from handout  Neena made moderate progress toward goals  Continue group to educate on ways to increase, and cope with positive coping strategies  Treatment Plan Problem(s): 1 1,1 2,1 4  Keegan Marcelino RN       (497) Education Therapy Goals set - create schedule for her day off tomorrow    Treatment Plan Problem(s): 1 1, 1 2  Education Therapy Time - 0900 - 0930 Previous goal was met  Readiness to Learning:  She is receptive to learning  There are  no barriers to learning  Learning Assessment Time - 1330 - 1400   Education completed on  illness and wellness tools  The teaching method was  verbal and written  Shared area of learning: Yes  Lewis Henderson MSW, LSW     [phone] Riky Messina actively shared in [facility] group focused on relaxation techniques and concept of mindfulness  She was observed to be engaged in therapist led relaxation exercises  She was quiet as peers shared use of exercises outside of program and discussed feedback  Group discussed specific ways to practice refocusing thoughts to the present and offered encouragement to use these things regularly to manage stressors consistently  Inconsistent effort noted toward tx goal  Continue AT to encourage development of wellness skills and consistent practice  Treatment Plan Problem(s): 1 5  Lisa Ho [facility]       Case Management Note:   [facility] 5 ([id number]) Met with Neena who reported she was disappointed in herself that she missed her OP therapy appointment yesterday   She reported she thought it was because she ran out of her Sonata on Monday and then did not sleep right - discussed excuses and that her need problem solve priorities and assess is not running out of medication and following through with appointments is a priority to her  She is unsure that the therapist will see her again - advised her to research her insurance and explore what she wants to gain from OP therapy  She also was asked to explore what it would look like to return to work as far as being late, functioning in the morning, etc  She has a meeting next week with her  to explore options (like early care home)  TREATMENT SESSION NUMBER: 12   Current suicide risk is low  Medications not changed/added/denied  Fidel Jolly [facility]      Active Problems    1  ADHD, predominantly inattentive type (314 00) (F90 0)   2  Allergic rhinitis (477 9) (J30 9)   3  Anxiety and depression (300 00,311) (F41 9,F32 9)   4  Benign essential hypertension (401 1) (I10)   5  BMI 31 0-31 9,adult (V85 31) (Z68 31)   6  Cervicalgia (723 1) (M54 2)   7  Constipation (564 00) (K59 00)   8  Dysfunction of eustachian tube, unspecified laterality (381 81) (H69 80)   9  Encounter for screening mammogram for malignant neoplasm of breast (V76 12)   (Z12 31)   10  FORTINO (generalized anxiety disorder) (300 02) (F41 1)   11  Genital herpes simplex (054 10) (A60 00)   12  Headache (784 0) (R51)   13  Hip pain, unspecified laterality   14  Hypokalemia (276 8) (E87 6)   15  Lumbago (724 2) (M54 5)   16  Lyme disease (088 81) (A69 20)   17  Motion sickness (994 6) (T75 3XXA)   18  Obesity, Class I, BMI 30-34 9 (278 00) (E66 9)   19  Other muscle spasm (728 85) (M62 838)   20  Severe episode of recurrent major depressive disorder, without psychotic features    (296 33) (F33 2)   21  Tachycardia (785 0) (R00 0)   22  Thyroid disorder (246 9) (E07 9)   23  Well adult on routine health check (V70 0) (Z00 00)    Past Medical History    1  History of Acute bronchitis (466 0) (J20 9)   2  History of Acute pharyngitis (462) (J02 9)   3  History of Acute sinusitis (461 9) (J01 90)   4  History of Acute sinusitis (461 9) (J01 90)   5   History of Acute tonsillitis (463) (J03 90)   6  History of Acute upper respiratory infection (465 9) (J06 9)   7  History of Acute upper respiratory infection (465 9) (J06 9)   8  History of Acute UTI (599 0) (N39 0)   9  History of Cervicalgia (723 1) (M54 2)   10  Denied: History of Head trauma   11  History of acute pharyngitis (V12 69) (Z87 09)   12  History of migraine (V12 49) (Z86 69)   13  History of Nonallopathic lesion (739 9) (M99 9)   14  History of Palpitations (785 1) (R00 2)   15  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)   16  Denied: History of Seizure   17  History of Shoulder joint pain, unspecified laterality   18  Urinary tract infection (599 0) (N39 0)    Allergies    1  Percocet TABS    Current Meds   1  Albuterol 90 MCG/ACT AERS; INHALE 1 TO 2 PUFFS EVERY 4 TO 6 HOURS AS   NEEDED; Therapy: (Recorded:34Uld0620) to Recorded   2  BuPROPion HCl ER (XL) 300 MG Oral Tablet Extended Release 24 Hour; TAKE 1   TABLET Daily thity tablets; Therapy: 51GPU2589 to (Evaluate:23Jun2016)  Requested for: 67JXS5337; Last   Rx:07Bdg1072 Ordered   3  CloNIDine HCl - 0 1 MG Oral Tablet; Take one tablet PO  AT 6:00 PM and 1 tablet PO AT   9:00 pm;   Therapy: (Recorded:60Pek1448) to Recorded   4  Griselda 0 35 MG Oral Tablet; TAKE 1 TABLET DAILY; Therapy: 20NOD7995 to Recorded   5  Flonase 50 MCG/ACT SUSP; USE 1 SPRAY IN EACH NOSTRIL ONCE DAILY; Therapy: (Recorded:80Xrd0387) to Recorded   6  Gabapentin 300 MG Oral Capsule; TAKE 3 CAPSULE Bedtime; Last Rx:52Obd8899   Ordered   7  HydrOXYzine HCl - 50 MG Oral Tablet; TAKE 2 TABLET Bedtime; Last Rx:03Oos5720   Ordered   8  Metoprolol Succinate ER 50 MG Oral Tablet Extended Release 24 Hour; TAKE 1 TABLET   DAILY; Therapy: 30CFP2447 to (Evaluate:16Oct2016)  Requested for: 27ZSL5843; Last   Rx:47Nxp9179 Ordered   9  Multivitamins TABS; 1 Every Day; Therapy: 47WUZ7739 to  Requested for: 11RKV8930 Recorded   10  Pristiq 100 MG Oral Tablet Extended Release 24 Hour;  Take 1 tablet daily; Therapy: 38KXN0889 to  Requested for: 81GMK2505 Recorded   11  Vyvanse 50 MG Oral Capsule; 1 capsule at 2:00pm;    Therapy: 62KQL4645 to  Requested for: 91XSJ6591 Recorded   12  Vyvanse 60 MG Oral Capsule; take 1 capsule daily; Therapy: (Recorded:18Udb5703) to Recorded   13  Zaleplon 10 MG Oral Capsule; TAKE 1 TO 2 CAPSULES AT BEDTIME AS NEEDED; Last    BETANCUR:30JAD7459 Ordered    Family Psych History  Son    1  Family history of attention deficit hyperactivity disorder (ADHD) (V17 0) (Z81 8)  Sister    2  Family history of Bipolar 1 disorder  Family History    3  Family history of Asbestosis   4  Family history of Cancer   5  Family history of Diabetes Mellitus (V18 0)   6  Family history of Emphysema   7  Family history of Glaucoma   8  Family history of Heart Disease (V17 49)   9  Family history of Hypertension (V17 49)   10   Family history of Mixed Hyperlipoproteinemia    Social History    · College graduate   ·    · Employed   · Never A Smoker   · No history of alcohol use    Future Appointments    Date/Time Provider Specialty Site   09/23/2016 10:15 AM Jihan Francis DO Psychiatry [facility]     Signatures   Electronically signed by : JONATHNA Siddiqui; Sep 21 2016 10:47AM EST                       (Author)    Electronically signed by : FAYE Joyner; Sep 21 2016  1:26PM EST                       (Author)    Electronically signed by : JONATHAN Siddiqui; Sep 21 2016  2:03PM EST                       (Author)    Electronically signed by : Ava Lopes RN; Sep 21 2016  4:05PM EST                       (Author)

## 2018-01-15 NOTE — PSYCH
History of Present Illness  [facility] Clinical Progress Note [facility]:   Specialized Services Documentation - Therapist must complete separate progress note for each specific clinical activity in which the client participated during the day  (057) Group Psychotherapy: (9:30-10:30) Neena participated in psychotherapy group focused on finding happiness and dealing with difficult relationships  Neena identified having difficulty accepting feeling good as a stressor  She explained that when she has days where she is not down about things, she finds herself wondering why she feels better and wonders when that feeling is going to end  Group discussed the temporary nature of emotions, including happiness, and how to keep in mind that happiness may go away but it will come back again  Keith Pedraza was engaged in group discussion and was supportive of peers that discussed difficult relationships  Moderate progress toward goals noted  Continue psychotherapy group to encourage Neena to explore stressors and healthy ways of coping  Treatment Plan Problem(s): 1 1, 1 2  Elin Green MSW, LSW     (865) Group Psychotherapy: [id number] Keith Homer participated in wellness group focused on the potential impact of mental illness on self-esteem  Neena shared that she turns her anger inward to depression when her self-esteem is low  Neena discussed her son who has very low self-esteem she stated as he does not do well in school  Keith Pedraza made slow progress toward goal  Continue group to provide opportunities for learn and reinforce healthy strategies to increase self-esteem within recovery  Treatment Plan Problem(s): 1 1,1 2,1 4  Magdalena Shen RN       (742) Education Therapy Goals set - put laundry away    Treatment Plan Problem(s): 1 1  Education Therapy Time - 0900 - 0930 Previous goal was met  Readiness to Learning:  She is receptive to learning  There are  no barriers to learning    Learning Assessment Time - 1330 - 1400   Education completed on  illness, aftercare and wellness tools  The teaching method was  verbal  Shared area of learning: Yes  FAYE Paige     (541) Allied Therapy 5920-0119 Neena actively shared in [facility] group focused on relationship with self and others  Group utilized mary jo analysis to explore roles in relationships  She shared in discussion and agreed with peers that her role in her relationships has changed over time  Group discussed how seeing our role in the dynamic can help us relate to others we live and work with  Group discussed ways to develop healthier relationship with self through safety, trust and respect  She identified this personal need  Group explored specific strategies to build relationship with self  Neena did share an aspect of herself âwhen she is wellâ from [employer]  Some work on goal noted  Continue AT to encourage self-awareness and active role in applying healthy skills  Treatment Plan Problem(s): 1 6  FAYE Paige       Case Management Note:   [facility] 10 ([phone]) Met with Neena  She reported a positive experience with OP therapy transition appointment  She was able to schedule additional appointments next week  She continues to appear increasingly active and recognizing she needs to leave the house each day  Toni Tobias has transition appointment with Dr Rodolfo Lin tomorrow  TREATMENT SESSION NUMBER: 17   Current suicide risk is low  Medications not changed/added/denied  FAYE Paige      Active Problems    1  ADHD, predominantly inattentive type (314 00) (F90 0)   2  Allergic rhinitis (477 9) (J30 9)   3  Anxiety and depression (300 00,311) (F41 9,F32 9)   4  Benign essential hypertension (401 1) (I10)   5  BMI 31 0-31 9,adult (V85 31) (Z68 31)   6  Cervicalgia (723 1) (M54 2)   7  Constipation (564 00) (K59 00)   8  Dysfunction of eustachian tube, unspecified laterality (381 81) (H69 80)   9   Encounter for screening mammogram for malignant neoplasm of breast (V76 12) (Z12 31)   10  FOTRINO (generalized anxiety disorder) (300 02) (F41 1)   11  Genital herpes simplex (054 10) (A60 00)   12  Headache (784 0) (R51)   13  Hip pain, unspecified laterality   14  Hypokalemia (276 8) (E87 6)   15  Lumbago (724 2) (M54 5)   16  Lyme disease (088 81) (A69 20)   17  Motion sickness (994 6) (T75 3XXA)   18  Obesity, Class I, BMI 30-34 9 (278 00) (E66 9)   19  Other muscle spasm (728 85) (M62 838)   20  Severe episode of recurrent major depressive disorder, without psychotic features    (296 33) (F33 2)   21  Tachycardia (785 0) (R00 0)   22  Thyroid disorder (246 9) (E07 9)   23  Well adult on routine health check (V70 0) (Z00 00)    Past Medical History    1  History of Acute bronchitis (466 0) (J20 9)   2  History of Acute pharyngitis (462) (J02 9)   3  History of Acute sinusitis (461 9) (J01 90)   4  History of Acute sinusitis (461 9) (J01 90)   5  History of Acute tonsillitis (463) (J03 90)   6  History of Acute upper respiratory infection (465 9) (J06 9)   7  History of Acute upper respiratory infection (465 9) (J06 9)   8  History of Acute UTI (599 0) (N39 0)   9  History of Cervicalgia (723 1) (M54 2)   10  Denied: History of Head trauma   11  History of acute pharyngitis (V12 69) (Z87 09)   12  History of migraine (V12 49) (Z86 69)   13  History of Nonallopathic lesion (739 9) (M99 9)   14  History of Palpitations (785 1) (R00 2)   15  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)   16  Denied: History of Seizure   17  History of Shoulder joint pain, unspecified laterality   18  Urinary tract infection (599 0) (N39 0)    Allergies    1  Percocet TABS    Current Meds   1  Albuterol 90 MCG/ACT AERS; INHALE 1 TO 2 PUFFS EVERY 4 TO 6 HOURS AS   NEEDED; Therapy: (Recorded:16Dkg3488) to Recorded   2  BuPROPion HCl ER (XL) 300 MG Oral Tablet Extended Release 24 Hour; TAKE 1   TABLET Daily thity tablets;    Therapy: 20INA1990 to (Evaluate:23Jun2016)  Requested for: 08PAA2596; Last   TP:63IDA5443 Ordered   3  CloNIDine HCl - 0 1 MG Oral Tablet; Take one tablet PO  AT 6:00 PM and 1 tablet PO AT   9:00 pm;   Therapy: (Recorded:60Ogo7353) to Recorded   4  Griselda 0 35 MG Oral Tablet; TAKE 1 TABLET DAILY; Therapy: 51JNT5179 to Recorded   5  Flonase 50 MCG/ACT SUSP; USE 1 SPRAY IN EACH NOSTRIL ONCE DAILY; Therapy: (Recorded:02Gqg3808) to Recorded   6  Gabapentin 300 MG Oral Capsule; TAKE 3 CAPSULE Bedtime; Last Rx:12Sac5205   Ordered   7  HydrOXYzine HCl - 50 MG Oral Tablet; TAKE 2 TABLET Bedtime; Last Rx:28Iqv2186   Ordered   8  Metoprolol Succinate ER 50 MG Oral Tablet Extended Release 24 Hour; TAKE 1 TABLET   DAILY; Therapy: 56HWU8848 to (Evaluate:16Oct2016)  Requested for: 88RCP7298; Last   Rx:21Maw9280 Ordered   9  Multivitamins TABS; 1 Every Day; Therapy: 52RNN0121 to  Requested for: 37QIP6429 Recorded   10  Pristiq 100 MG Oral Tablet Extended Release 24 Hour; Take 1 tablet daily; Therapy: 27IZC2827 to  Requested for: 51LJD9737 Recorded   11  Vyvanse 50 MG Oral Capsule; 1 capsule at 2:00pm;    Therapy: 63FVX7726 to  Requested for: 78PGI6404 Recorded   12  Vyvanse 60 MG Oral Capsule; take 1 capsule daily; Therapy: (Recorded:27Yvr7862) to Recorded   13  Zaleplon 10 MG Oral Capsule; TAKE 1 TO 2 CAPSULES AT BEDTIME AS NEEDED; Last    Rx:61Qsk5312 Ordered    Family Psych History  Son    1  Family history of attention deficit hyperactivity disorder (ADHD) (V17 0) (Z81 8)  Sister    2  Family history of Bipolar 1 disorder  Family History    3  Family history of Asbestosis   4  Family history of Cancer   5  Family history of Diabetes Mellitus (V18 0)   6  Family history of Emphysema   7  Family history of Glaucoma   8  Family history of Heart Disease (V17 49)   9  Family history of Hypertension (V17 49)   10   Family history of Mixed Hyperlipoproteinemia    Social History    · College graduate   ·    · Employed   · Never A Smoker   · No history of alcohol use    Future Appointments    Date/Time Provider Specialty Site   10/06/2016 12:00 PM Riya Capps M D  Psychiatry [facility]   10/07/2016 10:00 AM GEORGETTE Curtis   Psychiatry [facility]     Signatures   Electronically signed by : JONATHAN Wilkes; Oct  4 2016 12:20PM EST                       (Author)    Electronically signed by : FAYE Ruffin; Oct  4 2016  2:59PM EST                       (Author)    Electronically signed by : Anaya Tejeda RN; Oct  4 2016  3:18PM EST                       (Author)

## 2018-01-15 NOTE — PSYCH
History of Present Illness  [facility] Clinical Progress Note [facility] [place]:   Specialized Services Documentation - Therapist must complete separate progress note for each specific clinical activity in which the client participated during the day  ([phone]) Group Psychotherapy: (9:30-10:30) Neena attended psychotherapy group focused on managing return to work fears and relationship issues  Neena identified her relationship as a stressor  She participated in discussion about return to work fears and what coworkers will think or say, and expressed disappointment and confusion about no one reaching out to her to check on her or ask how she has been doing  Peers provided support and feedback about ways to think more positively about the situation and let go of some of her worries  Some moderate progress noted toward goals today  Continue psychotherapy group to encourage Neena to explore stressors and healthier ways of coping  Treatment Plan Problem(s): 1 1, 1 2  Yumiko Boothe MSW, LSW     (931) Group Psychotherapy: [id number] Jackelyn Whittington participated in weekly wellness group to discuss what particular area of wellness that has been worked on up to today in Program and choice of area to continue working on for recovery as targeted in treatment plan, by choice or in WRAP  Neena shared that she is going to concentrate on building her support system  She stated "I really have no supports " She then said she does have her fiance but that he is "Not always support " Peers encouraged Neena to move out of her comfort zone and to try new ways to get out and meet other individuals with similar interests as she has  Neena remained hesitant in response to peer suggestions and encouragement and stated that she did not think many of the suggestions would work for her  Jackelyn Whittington made minimal progress toward goal  Continue to offer weekly wellness as an strategy to offer opportunity to focus on goals and identify areas of goal achievement and need  Treatment Plan Problem(s): 1 4  Domo Herman RN       (937) Education Therapy Goals set - stay on schedule with medications even with the weekend    Treatment Plan Problem(s): 1 3  Education Therapy Time - 0900 - 0930 Previous goal was met  Readiness to Learning:  She is receptive to learning  There are  no barriers to learning  Learning Assessment Time - 1330 - 1400   Education completed on  illness and wellness tools  The teaching method was  verbal  Shared area of learning: Yes  FAYE Niño     (015) Allied Therapy [id number] Neena actively engaged in [facility] group focused on support and challenging stigma  She was attentive as group engaged in discussion with Certified Peer Specialist Alva  Group explored community resources and the value of living without shame and taking responsibility for recovery  Neena identified many aspects of his story resonating with her  She asked appropriate questions and expressed interest in learning more about her illness through peer to peer offerings  Weekend supports reinforced and she identified resources available to her despite initial difficulty identifying them  Some positive exploration of treatment goal noted  Continue AT to encourage awareness of resources and use of healthy supports  Treatment Plan Problem(s): 1 4  FAYE Niño       Case Management Note:   IOP 3 5851-7329 Met with Neena  She shared both disappointments and successes from day off yesterday  She is aware how her emotions got in her way and benefitted from exercise in group this morning called the "ripple effect"  Reviewed weekend plans which are limited and the risk of this discussed  She was encouraged to use the mindfulness application on her phone, which she found helpful yesterday, daily  Medication refill prescriptions given as requested by Neena (by Dr Miles Laboy)  Weekend professional supports reviewed and the importance of her working on community supports discussed  Schedule for next week reviewed  TREATMENT SESSION NUMBER: 10   Current suicide risk is low  Medications not changed/added/denied  Radhika Little, [facility] [place]      Active Problems    1  ADHD, predominantly inattentive type (314 00) (F90 0)   2  Allergic rhinitis (477 9) (J30 9)   3  Anxiety and depression (300 00,311) (F41 9,F32 9)   4  Benign essential hypertension (401 1) (I10)   5  BMI 31 0-31 9,adult (V85 31) (Z68 31)   6  Cervicalgia (723 1) (M54 2)   7  Constipation (564 00) (K59 00)   8  Dysfunction of eustachian tube, unspecified laterality (381 81) (H69 80)   9  Encounter for screening mammogram for malignant neoplasm of breast (V76 12)   (Z12 31)   10  FORTINO (generalized anxiety disorder) (300 02) (F41 1)   11  Genital herpes simplex (054 10) (A60 00)   12  Headache (784 0) (R51)   13  Hip pain, unspecified laterality   14  Hypokalemia (276 8) (E87 6)   15  Lumbago (724 2) (M54 5)   16  Lyme disease (088 81) (A69 20)   17  Motion sickness (994 6) (T75 3XXA)   18  Obesity, Class I, BMI 30-34 9 (278 00) (E66 9)   19  Other muscle spasm (728 85) (M62 838)   20  Severe episode of recurrent major depressive disorder, without psychotic features    (296 33) (F33 2)   21  Tachycardia (785 0) (R00 0)   22  Thyroid disorder (246 9) (E07 9)   23  Well adult on routine health check (V70 0) (Z00 00)    Past Medical History    1  History of Acute bronchitis (466 0) (J20 9)   2  History of Acute pharyngitis (462) (J02 9)   3  History of Acute sinusitis (461 9) (J01 90)   4  History of Acute sinusitis (461 9) (J01 90)   5  History of Acute tonsillitis (463) (J03 90)   6  History of Acute upper respiratory infection (465 9) (J06 9)   7  History of Acute upper respiratory infection (465 9) (J06 9)   8  History of Acute UTI (599 0) (N39 0)   9  History of Cervicalgia (723 1) (M54 2)   10  Denied: History of Head trauma   11  History of acute pharyngitis (V12 69) (Z87 09)   12  History of migraine (V12 49) (Z86 69)   13   History of Nonallopathic lesion (739 9) (M99 9)   14  History of Palpitations (785 1) (R00 2)   15  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)   16  Denied: History of Seizure   17  History of Shoulder joint pain, unspecified laterality   18  Urinary tract infection (599 0) (N39 0)    Allergies    1  Percocet TABS    Current Meds   1  Albuterol 90 MCG/ACT AERS; INHALE 1 TO 2 PUFFS EVERY 4 TO 6 HOURS AS   NEEDED; Therapy: (Recorded:52Lqg0711) to Recorded   2  BuPROPion HCl ER (XL) 300 MG Oral Tablet Extended Release 24 Hour; TAKE 1   TABLET Daily thity tablets; Therapy: 20OUZ2450 to (Evaluate:23Jun2016)  Requested for: 34ZEV8197; Last   Rx:24May2016 Ordered   3  CloNIDine HCl - 0 1 MG Oral Tablet; Take one tablet PO  AT 6:00 PM and 1 tablet PO AT   9:00 pm;   Therapy: (Recorded:18Hya0182) to Recorded   4  Griselda 0 35 MG Oral Tablet; TAKE 1 TABLET DAILY; Therapy: 98GNT6242 to Recorded   5  Flonase 50 MCG/ACT SUSP; USE 1 SPRAY IN EACH NOSTRIL ONCE DAILY; Therapy: (Recorded:37Axf5185) to Recorded   6  Gabapentin 300 MG Oral Capsule; TAKE 3 CAPSULE Bedtime; Therapy: (Recorded:89Qqb7381) to Recorded   7  HydrOXYzine HCl - 25 MG Oral Tablet; TAKE 1 TO 2 TABLETS AT BEDTIME; Last   Rx:35Phr9909 Ordered   8  Metoprolol Succinate ER 50 MG Oral Tablet Extended Release 24 Hour; TAKE 1 TABLET   DAILY; Therapy: 27SQP8360 to (Evaluate:16Oct2016)  Requested for: 77UIQ5884; Last   Rx:19Apr2016 Ordered   9  Multivitamins TABS; 1 Every Day; Therapy: 26PBF2831 to  Requested for: 42UFU4975 Recorded   10  Pristiq 100 MG Oral Tablet Extended Release 24 Hour; Take 1 tablet daily; Therapy: 43DXD2523 to  Requested for: 20YGD9470 Recorded   11  Vyvanse 50 MG Oral Capsule; 1 capsule at 2:00pm;    Therapy: 15PZH2072 to  Requested for: 56JON9706 Recorded   12  Vyvanse 60 MG Oral Capsule; take 1 capsule daily; Therapy: (Recorded:46Tkr7021) to Recorded   13   Zaleplon 10 MG Oral Capsule; TAKE 1 TO 2 CAPSULES AT BEDTIME AS NEEDED; Last UX:82FJV4308 Ordered    Family Psych History  Son    1  Family history of attention deficit hyperactivity disorder (ADHD) (V17 0) (Z81 8)  Sister    2  Family history of Bipolar 1 disorder  Family History    3  Family history of Asbestosis   4  Family history of Cancer   5  Family history of Diabetes Mellitus (V18 0)   6  Family history of Emphysema   7  Family history of Glaucoma   8  Family history of Heart Disease (V17 49)   9  Family history of Hypertension (V17 49)   10  Family history of Mixed Hyperlipoproteinemia    Social History    · College graduate   ·    · Employed   · Never A Smoker   · No history of alcohol use    Future Appointments    Date/Time Provider Specialty Site   09/19/2016 10:30 AM GEORGETTE Geiger  Psychiatry [facility]PITAL[facility]   09/21/2016 10:15 AM GEORGETTE Geiger   Psychiatry [facility]   09/23/2016 10:15 AM Nidhi Peck DO Psychiatry [facility]ITAL[facility]   09/20/2016 11:00 AM Paulette Hankins [address] THERAPISTS     Signatures   Electronically signed by : JONATHAN Gracia; Sep 16 2016 11:55AM EST                       (Author)    Electronically signed by : Tien Huitron RN; Sep 16 2016  2:31PM EST                       (Author)    Electronically signed by : FAYE Lyons; Sep 16 2016  3:03PM EST                       (Author)

## 2018-01-15 NOTE — PSYCH
Assessment    1  FORTINO (generalized anxiety disorder) (300 02) (F41 1)   2  ADHD, predominantly inattentive type (314 00) (F90 0)   3  Severe episode of recurrent major depressive disorder, without psychotic features   (296 33) (F33 2)    [facility] Treatment Plan    [facility] Treatment Plan   AREAS OF NEEDS: Depression as evidenced by decrease in basic self-care, sleep and appetite disturbance, excessive guilt, isolation, feelings of helplessness, decreased concentration related to work/financial/relationship stressors  Date Initiated: 8/31/16     LONG TERM GOAL: 1 0 I will identify 3 signs that my mood is more balanced and I am more productive in my life  Date Initiated: 8/31/16   Target Date: 9/28/16   Completion Date: 10/7/16     Date Initiated: 8/31/16    1 1 I will list 5 things I need to physically or emotionally do to take care of myself daily and keep a log of completion and/or what gets in the way   Target Date: 9/12/16   Completion Date: 9/12/16   Date Initiated: 8/31/16    1 2 I will review my WRAP for 10 minutes daily and add 1 new concept or awareness daily gained from program   Target Date: 9/12/16   Completion Date: 9/12/16   Date Initiated: 8/31/16   Revision Date: 9/12/16   1 3 I will take my medication as prescribed and share questions/concerns if arise   Target Date: 9/12/16   Completion Date: 10/7/16   Date Initiated: 8/31/16   Revision Date: 9/12/16   1 4 I will identify 3 resources for support and attend at least 1 community support group before 9/12/16   Target Date: 9/12/16   Completion Date: 10/7/16          7 DAY REVISION: 1 5 I will identify 3 things I need to do to be successful as I prepare to return to work, 1 6 I will begin to work on identifying 5 personal strengths and work on applying them to grow my self confidence, 1 7 I will identify 3 ways I can support a successful transition to OP level of care and continue wellness after discharge     Date Initiated: 9/12/16    Target Date: 16   Completion Date: 10/7/16   Date Initiated: 16    Target Date: 16   Completion Date: 10/7/16   Date Initiated: 16    Target Date: 10/14/16   Completion Date: 10/7/16         PSYCHIATRY: Medication management and education  Continue medication management and education  Continue medication management and education  Date Initiated: 2016   Revision Date: 2016, 2016   The person(s) responsible for carrying out the plan is Sarai Larose MD    NURSIN 1,1 2,1 3,1 4 This RN will offer daily wellness group, Mon through Fri, to educate Neena on her diagnoses and medications used in treatment  1 1,1 2,1 3,1 4 This RN will continue to offer daily wellness group to educate Neena on her diagnoses and medications used in treatment  1 3,1 4,1 5,1 6,1 7 Continue to offer daily wellness group to educate Neena on diagnoses and medications as well as offer encouragement for wellness goals  Date Initiated: 16     Revision Date: 16, 16     The person(s) responsible for carrying out the plan is Betsy Kumar RN    PSYCHOLOGY: 1 1, 1 2, 1 4 Provide psychotherapy group 5 times per week to allow opportunity for patient to explore stressors and ways of coping  1 1, 1 2, 1 4 Continue to provide psychotherapy group each program day to encourage Neena to further explore stressors and coping  1 4, 1 5, 1 6 Continue to offer psychotherapy group each program day to allow Neena the opportunity to explore stressors and healthier ways of coping  Date Initiated: 2016   Revision Date: 2016; 2016   The person(s) responsible for carrying out the plan is HARSHAL Reyes [place]  ALLIED THERAPY: 1 1, 1 2, 1 4 Encourage Neena to be active in AT group 5x weekly to address the development and use of wellness tools in order to improve the management of symptoms and support recovery through meaningful activity    1 4, 1 5, 1 6 Continue AT toe encourage active participation in group and applying skills outside of program to support overall wellness through healthy tasks  [facility]  1 4, 1 5, 1 6, 1 7 Continue AT to encourage active role in developing discharge plan through inclusion in meaningful tasks  [facility]     Date Initiated: 8/31/16   Revision Date: 9/12/16; 9/30/16   The person(s) responsible for carrying out the plan is FAYE Maharaj  CASE MANAGEMENT: 1 0 Meet with Neena 3-4 times weekly to assess tx progress, DC planning, connection to community supports and UR as indicated  [facility]   Date Initiated: 8/31/16   Revision Date: 9/12/16   The person(s) responsible for carrying out the plan is FAYE Maharaj  DISCHARGE CRITERIA: Identify 3 signs of improvement and complete relapse prevention plan   DISCHARGE PLAN: OP providers and support groups   Estimated Length of Stay: 10 days   Strengths: desire to improve   Limitations: limited support   Diagnosis and Treatment Plan explained to patient, patient relates understanding diagnosis and is agreeable to Treatment Plan           CLIENT COMMENTS / Please share your thoughts, feelings, need and/or experiences regarding your treatment plan: _____________________________________________________________________________________________________________________________________________________________________________________________________________________________________________________________________________________________________________________ Date/Time: ______________     Patient Signature: _________________________________ Date/Time: ______________      Signatures   Electronically signed by : GEORGTETE Blum ; Aug 31 2016  9:42AM EST                       (Author)    Electronically signed by : ALBERTO AuW; Aug 31 2016 12:04PM EST                       (Author)    Electronically signed by : Jami Chowdhury RN; Aug 31 2016  2:15PM EST                       (Author)    Electronically signed by : ROGERS ChampagneBC; Sep  1 2016  7:46AM EST                       (Author)    Electronically signed by : GEORGETTE De Souza ; Sep 12 2016  8:42AM EST                       (Author)    Electronically signed by : FAYE Champagne; Sep 12 2016  1:02PM EST                       (Author)    Electronically signed by : Maggie Potter RN; Sep 12 2016  4:02PM EST                       (Author)    Electronically signed by : JONATHAN Mcdonough; Sep 13 2016  3:47PM EST                       (Author)    Electronically signed by : ROGERS ChampagneBC; Sep 30 2016  1:59PM EST                       (Author)    Electronically signed by : Maggie Potter RN; Sep 30 2016  2:41PM EST                       (Author)    Electronically signed by : GEORGETTE De Souza ; Oct  3 2016  7:53AM EST                       (Author)    Electronically signed by : JONATHAN Mcdonough; Oct  3 2016  9:28AM EST                       (Author)    Electronically signed by : ROGERS ChampagneBC; Oct  7 2016  3:51PM EST                       (Author)
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
## 2018-01-15 NOTE — PSYCH
History of Present Illness  [facility] Clinical Progress Note [facility]:   Specialized Services Documentation - Therapist must complete separate progress note for each specific clinical activity in which the client participated during the day  (915) Group Psychotherapy: (9:30-10:30) Neena participated in psychotherapy group focused on refocusing negative thoughts as well as the grief process  Neena identified work, her children and her boyfriend as stressors  Group discussed cognitive distortions such as mind reading and catastrophizing, and how to reframe those thoughts in more positive ways  Neena provided feedback to a peer about ways to think more positively about her return to work, but was otherwise quiet throughout group discussion  She seemed distracted at times, and was observed doodling on a notepad often throughout the hour  Minimal progress toward goals today  Continue psychotherapy group to encourage Neena to explore stressors and healthy ways of coping  Treatment Plan Problem(s): 1 1, 1 2  Miracle CAPUTO, LSW     (021) Group Psychotherapy: [id number] [address] participated in wellness group focused on WRAP wellness recovery action plan completion and individualizing use of WRAP during [facility] and after discharge  Neena shared how her WRAP has helped her both last time she attended Program and how she is using the WRAP application this time in Program on her cell phone  Neena also shared in discussion of using organizational skills to be more productive in her daily life  She cited that she uses a small calendar that was suggested by her OP psychiatrist and carries this with her  [address] made moderate progress toward goal  Continue group to educate and encourage use of WRAP instrument to learn, and practice, behavioral and cognitive strategies for recovery  Treatment Plan Problem(s): 1 1,1 2   Familia Crain RN     (910) Group Psychotherapy: [id number] [address] participated in wellness group focused on identifying personal triggers  Handout was shared to note all triggers encountered in one day and to assess whether they are internal or external triggers and how they were handled  Neena shared that she does not like the smell of cigarette smoke on other people and that it annoys her if she has to sit next to someone who smokes  The peer sitting next to [facility] recoiled, as she is a smoker  Peers discussed how to handle external triggers that can be challenging such as others behaviors  Neena also mentioned that she does not like people to touch her without her permission  She made this statement then looked at another peer in Program who she had asked not to touch her  Peers discussed boundaries and healthy boundary setting with touching  [facility] made moderate progress toward goal  Continue group to educate and provide practice on identifying and handling triggers to self-defeating behaviors in a healthier way  Keith Nesbitt RN     (370) Education Therapy Goals set - keep her happy mood going (watch a funny movie or comedy channel)    Treatment Plan Problem(s): 1 1, 1 2  Education Therapy Time - 0900 - 0930 Previous goal was met  Readiness to Learning:  She is receptive to learning  There are  no barriers to learning  Learning Assessment Time - 1330 - 1400   Education completed on  illness and wellness tools  The teaching method was  verbal and written  Shared area of learning: Yes  LEONCIO Waldron         Case Management Note:   IOP day 7 12:05-12:10 This CM met briefly with Neena to discuss any concerns she might have that need addressing today  She said that she had a difficult weekend, but that she could wait to talk about it with [facility] on Wednesday  Told Neena to come to this CM's office if she had anything to discuss at any point today  TREATMENT SESSION NUMBER: 14   Current suicide risk is low  Medications not changed/added/denied  LEONCIO Waldron      Active Problems    1   ADHD, predominantly inattentive type (314 00) (F90 0)   2  Allergic rhinitis (477 9) (J30 9)   3  Anxiety and depression (300 00,311) (F41 9,F32 9)   4  Benign essential hypertension (401 1) (I10)   5  BMI 31 0-31 9,adult (V85 31) (Z68 31)   6  Cervicalgia (723 1) (M54 2)   7  Constipation (564 00) (K59 00)   8  Dysfunction of eustachian tube, unspecified laterality (381 81) (H69 80)   9  Encounter for screening mammogram for malignant neoplasm of breast (V76 12)   (Z12 31)   10  FORTINO (generalized anxiety disorder) (300 02) (F41 1)   11  Genital herpes simplex (054 10) (A60 00)   12  Headache (784 0) (R51)   13  Hip pain, unspecified laterality   14  Hypokalemia (276 8) (E87 6)   15  Lumbago (724 2) (M54 5)   16  Lyme disease (088 81) (A69 20)   17  Motion sickness (994 6) (T75 3XXA)   18  Obesity, Class I, BMI 30-34 9 (278 00) (E66 9)   19  Other muscle spasm (728 85) (M62 838)   20  Severe episode of recurrent major depressive disorder, without psychotic features    (296 33) (F33 2)   21  Tachycardia (785 0) (R00 0)   22  Thyroid disorder (246 9) (E07 9)   23  Well adult on routine health check (V70 0) (Z00 00)    Past Medical History    1  History of Acute bronchitis (466 0) (J20 9)   2  History of Acute pharyngitis (462) (J02 9)   3  History of Acute sinusitis (461 9) (J01 90)   4  History of Acute sinusitis (461 9) (J01 90)   5  History of Acute tonsillitis (463) (J03 90)   6  History of Acute upper respiratory infection (465 9) (J06 9)   7  History of Acute upper respiratory infection (465 9) (J06 9)   8  History of Acute UTI (599 0) (N39 0)   9  History of Cervicalgia (723 1) (M54 2)   10  Denied: History of Head trauma   11  History of acute pharyngitis (V12 69) (Z87 09)   12  History of migraine (V12 49) (Z86 69)   13  History of Nonallopathic lesion (739 9) (M99 9)   14  History of Palpitations (785 1) (R00 2)   15  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)   16  Denied: History of Seizure   17   History of Shoulder joint pain, unspecified laterality   18  Urinary tract infection (599 0) (N39 0)    Allergies    1  Percocet TABS    Current Meds   1  Albuterol 90 MCG/ACT AERS; INHALE 1 TO 2 PUFFS EVERY 4 TO 6 HOURS AS   NEEDED; Therapy: (Recorded:20Hbi9162) to Recorded   2  BuPROPion HCl ER (XL) 300 MG Oral Tablet Extended Release 24 Hour; TAKE 1   TABLET Daily thity tablets; Therapy: 27MTN1924 to (Evaluate:23Jun2016)  Requested for: 32ZDA9494; Last   Rx:24May2016 Ordered   3  CloNIDine HCl - 0 1 MG Oral Tablet; Take one tablet PO  AT 6:00 PM and 1 tablet PO AT   9:00 pm;   Therapy: (Recorded:68Owq3253) to Recorded   4  Griselda 0 35 MG Oral Tablet; TAKE 1 TABLET DAILY; Therapy: 23EQS7364 to Recorded   5  Flonase 50 MCG/ACT SUSP; USE 1 SPRAY IN EACH NOSTRIL ONCE DAILY; Therapy: (Recorded:26Gnd9843) to Recorded   6  Gabapentin 300 MG Oral Capsule; TAKE 3 CAPSULE Bedtime; Last Rx:09Enn5875   Ordered   7  HydrOXYzine HCl - 50 MG Oral Tablet; TAKE 2 TABLET Bedtime; Last Rx:05Ycj9936   Ordered   8  Metoprolol Succinate ER 50 MG Oral Tablet Extended Release 24 Hour; TAKE 1 TABLET   DAILY; Therapy: 80OSR4184 to (Evaluate:16Oct2016)  Requested for: 58PSR6169; Last   Rx:42Lbf0334 Ordered   9  Multivitamins TABS; 1 Every Day; Therapy: 46GCD6499 to  Requested for: 04HOI8844 Recorded   10  Pristiq 100 MG Oral Tablet Extended Release 24 Hour; Take 1 tablet daily; Therapy: 20YWW5683 to  Requested for: 64MTT0778 Recorded   11  Vyvanse 50 MG Oral Capsule; 1 capsule at 2:00pm;    Therapy: 17ZBH8878 to  Requested for: 32HEF9969 Recorded   12  Vyvanse 60 MG Oral Capsule; take 1 capsule daily; Therapy: (Recorded:88Ilg8745) to Recorded   13  Zaleplon 10 MG Oral Capsule; TAKE 1 TO 2 CAPSULES AT BEDTIME AS NEEDED; Last    Rx:65Pqs5998 Ordered    Family Psych History  Son    1  Family history of attention deficit hyperactivity disorder (ADHD) (V17 0) (Z81 8)  Sister    2  Family history of Bipolar 1 disorder  Family History    3   Family history of Asbestosis   4  Family history of Cancer   5  Family history of Diabetes Mellitus (V18 0)   6  Family history of Emphysema   7  Family history of Glaucoma   8  Family history of Heart Disease (V17 49)   9  Family history of Hypertension (V17 49)   10  Family history of Mixed Hyperlipoproteinemia    Social History    · College graduate   ·    · Employed   · Never A Smoker   · No history of alcohol use    Future Appointments    Date/Time Provider Specialty Site   09/28/2016 10:15 AM GEORGETTE Merrill  Psychiatry [facility]   09/30/2016 10:15 AM GEORGETTE Merrill   [facility]     Signatures   Electronically signed by : Madonna Nageotte, MSWLSW; Sep 26 2016  2:24PM EST                       (Author)    Electronically signed by : Jose Lawton RN; Sep 26 2016  3:57PM EST                       (Author)    Electronically signed by : FAYE Barger; Sep 28 2016  1:24PM EST                       (Author)

## 2018-01-16 NOTE — PSYCH
History of Present Illness  [facility] Clinical Progress Note [facility]:   Specialized Services Documentation - Therapist must complete separate progress note for each specific clinical activity in which the client participated during the day  [phone]) Group Psychotherapy: [phone] [phone]) Neena attended psychotherapy group dealing with topics of boundaries and communication in relationships  Neena identified her relationship with her boyfriend/fiance as a stressor, and discussed her frustration with his refusal to follow through on divorce and not giving her an answer as to why he will not divorce his wife  Neena stated that she has come to terms with the fact that they are not going to get  any time soon, but that she feels stuck because she does not have the answer she wants from him  She was receptive to peer feedback, and offered significant supportive feedback to peers  Some progress noted toward goals  Continue group therapy to offer patient opportunity to continue to explore stressors and coping  Treatment Plan Problem(s): 1 1, 1 2  Arlette Casiano MSW, LSW     (282) Group Psychotherapy: [phone] [facility] participated in wellness group focused on discussion of building supportive relationships to maintain wellness in recovery and plan for relapse prevention  Pt contributed actively to discussion of using good boundaries when choosing who supports will be and what information will be shared with them  Pt stated that she is more guarded in sharing her emotions with others and that she has become more private about them since her divorce  Pt made mild progress toward goal  Continue group to educate pt on identifying and enlisting supports for their continued recovery from mental illness  Treatment Plan Problem(s): 1 1,1 2  Ava Lopes RN       (384) Education Therapy Goals set - buy food supplies and stick to a budget    Treatment Plan Problem(s): 1 5     Education Therapy Time - 0900 - 0930 Previous goal was met    Readiness to Learning:  She is receptive to learning  There are  no barriers to learning  Learning Assessment Time - 1330 - 1400   Education completed on  illness, medication and wellness tools  The teaching method was  verbal and demonstration  Shared area of learning: Yes  FAYE Lyons     (258) Allied Therapy [phone] Neena actively shared in relaxation group focused on skill development and the concept of âletting goana maría  Engaged in therapist led exercises and reported benefit from progressive muscle relaxation giving appropriate feedback  During group discussion on âletting goâ, she participated in group reading and reflected on concept of trying to control situations that have hurt her  Group reinforced importance of consistently caring for one's self and use of strategies explored to refocus to the present  Some positive effort toward goal observed  Continue AT to increase skill awareness and use of skills consistently  1 5  Treatment Plan Problem(s): 1 5  FAYE yLons       Case Management Note:   11/1 IOP [phone] Neena met with CM this morning to request renewal of Klonopin and this request was referred to Program psychiatrist  Pt also was advised that she has now has been approved, by her insurance company, to remain in [facility] but she will begin IOP Mon , Wed, Fri  attendance today 6/8 being first IOP day  Pt explained that she had another auditory and visual hallucination last evening of her sister who was upset possibly with her daughter Mario Nash but Karla Pimentelkey could not say whether it was a dream or hallucination  Pt states she is handling these hallucinations well and is not becoming "upset" but does not know why she is having hallucinations since being D/C'd from hospital  Pt denies she has experienced auditory or visual hallucinations before recent psychiatric hospitalization  Pt denies SI and HI, manic or other psychotic symptoms     TREATMENT SESSION NUMBER: 11   Current suicide risk is low  Medications not changed/added/denied  Radha Holman, RN   [facility] Follow-up Physician's Orders:   6/8/16   8:55 AM IOP   MD Radha Figueroa, NUZHAT      Active Problems    1  ADHD, predominantly inattentive type (314 00) (F90 0)   2  Allergic rhinitis (477 9) (J30 9)   3  Anxiety and depression (300 00,311) (F41 9,F32 9)   4  Benign essential hypertension (401 1) (I10)   5  BMI 31 0-31 9,adult (V85 31) (Z68 31)   6  Cervicalgia (723 1) (M54 2)   7  Constipation (564 00) (K59 00)   8  Dysfunction of eustachian tube, unspecified laterality (381 81) (H69 80)   9  Encounter for screening mammogram for malignant neoplasm of breast (V76 12)   (Z12 31)   10  FORTINO (generalized anxiety disorder) (300 02) (F41 1)   11  Genital herpes simplex (054 10) (A60 00)   12  Headache (784 0) (R51)   13  Hip pain, unspecified laterality   14  Hypokalemia (276 8) (E87 6)   15  Lumbago (724 2) (M54 5)   16  Lyme disease (088 81) (A69 20)   17  Motion sickness (994 6) (T75 3XXA)   18  Obesity, Class I, BMI 30-34 9 (278 00) (E66 9)   19  Other muscle spasm (728 85) (M62 838)   20  Severe episode of recurrent major depressive disorder, without psychotic features    (296 33) (F33 2)   21  Tachycardia (785 0) (R00 0)   22  Thyroid disorder (246 9) (E07 9)   23  Well adult on routine health check (V70 0) (Z00 00)    Past Medical History    1  History of Acute bronchitis (466 0) (J20 9)   2  History of Acute pharyngitis (462) (J02 9)   3  History of Acute sinusitis (461 9) (J01 90)   4  History of Acute sinusitis (461 9) (J01 90)   5  History of Acute tonsillitis (463) (J03 90)   6  History of Acute upper respiratory infection (465 9) (J06 9)   7  History of Acute upper respiratory infection (465 9) (J06 9)   8  History of Acute UTI (599 0) (N39 0)   9  History of Cervicalgia (723 1) (M54 2)   10  History of acute pharyngitis (V12 69) (Z87 09)   11   History of migraine (V12 49) (Z86 69) 12  History of Nonallopathic lesion (739 9) (M99 9)   13  History of Palpitations (785 1) (R00 2)   14  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)   15  History of Shoulder joint pain, unspecified laterality   16  Urinary tract infection (599 0) (N39 0)    Allergies    1  Percocet TABS    Current Meds   1  Albuterol 90 MCG/ACT AERS; INHALE 1 TO 2 PUFFS EVERY 4 TO 6 HOURS AS   NEEDED; Therapy: (Recorded:98Kij3356) to Recorded   2  BuPROPion HCl ER (XL) 300 MG Oral Tablet Extended Release 24 Hour; TAKE 1   TABLET Daily thity tablets; Therapy: 11TXA1619 to (Evaluate:23Jun2016)  Requested for: 23GMJ0186; Last   Rx:24May2016 Ordered   3  Griselda 0 35 MG Oral Tablet; TAKE 1 TABLET DAILY; Therapy: 51NCN6999 to Recorded   4  Flonase 50 MCG/ACT SUSP; USE 1 SPRAY IN EACH NOSTRIL ONCE DAILY; Therapy: (Recorded:20May2016) to Recorded   5  HydrOXYzine HCl - 25 MG Oral Tablet; TAKE 1 TO 2 TABLETS AT BEDTIME; Last   Rx:85Guy4600 Ordered   6  Metoprolol Succinate ER 50 MG Oral Tablet Extended Release 24 Hour; TAKE 1 TABLET   DAILY; Therapy: 94JXE0860 to (Evaluate:16Oct2016)  Requested for: 99GJC7173; Last   Rx:19Apr2016 Ordered   7  Multivitamins TABS; 1 Every Day; Therapy: 25PLC3785 to  Requested for: 52POV5008 Recorded   8  Pristiq 100 MG Oral Tablet Extended Release 24 Hour; Take 1 tablet daily; Therapy: 56SHE0329 to  Requested for: 91ANO6611 Recorded   9  Vyvanse 50 MG Oral Capsule; 1 capsule at 2:00pm;   Therapy: 43TUO1120 to  Requested for: 19EPL5004 Recorded   10  Vyvanse 60 MG Oral Capsule; take 1 capsule daily; Therapy: (Recorded:24May2016) to Recorded   11  Zaleplon 10 MG Oral Capsule; TAKE 1 TO 2 CAPSULES AT BEDTIME AS NEEDED; Last    ML:72NNR3781 Ordered    Family Psych History  Son    1  Family history of attention deficit hyperactivity disorder (ADHD) (V17 0) (Z81 8)  Sister    2  Family history of Bipolar 1 disorder  Family History    3  Family history of Asbestosis   4  Family history of Cancer   5  Family history of Diabetes Mellitus (V18 0)   6  Family history of Emphysema   7  Family history of Glaucoma   8  Family history of Heart Disease (V17 49)   9  Family history of Hypertension (V17 49)   10  Family history of Mixed Hyperlipoproteinemia    Social History    · College graduate   ·    · Employed   · Never A Smoker   · No history of alcohol use    Future Appointments    Date/Time Provider Specialty Site   06/08/2016 10:30 AM GEORGETTE Arevalo  Psychiatry [facility] PART[facility] HOSPITALIZ[facility]   06/09/2016 10:30 AM GEORGETTE Arevalo   Psychiatry [facility] H[facility]ITAL[facility]   07/14/2016 11:00 [facility]     Signatures   Electronically signed by : GEORGETTE Leblanc ; Jun 8 2016  8:55AM EST                       (Author)    Electronically signed by : Mike Ramirez RN; Jun 8 2016 10:22AM EST                       (Author)    Electronically signed by : Mike Ramirez RN; Jun 8 2016 10:23AM EST                       (Author)    Electronically signed by : JONATHAN Sandoval; Jun 8 2016 12:53PM EST                       (Author)    Electronically signed by : Mike Ramirez RN; Jun 8 2016  2:59PM EST                       (Author)    Electronically signed by : FAYE Wayne; Jun 8 2016  3:01PM EST                       (Author)

## 2018-01-16 NOTE — PSYCH
Assessment    1  FORTINO (generalized anxiety disorder) (300 02) (F41 1)   2  ADHD, predominantly inattentive type (314 00) (F90 0)   3  Family history of Bipolar 1 disorder : Sister   4  College graduate   5  No history of alcohol use   6     7  Employed   8  Severe episode of recurrent major depressive disorder, without psychotic features   (296 33) (F33 2)    [facility] Physician's Orders  ADMIT TO: Partial Hospitalization 5 x per week for 15 days  Vital signs routine  Diet: regular  Group Psychotherapy 9 x per week    Allied Therapy Group 6 x per week     Diagnosis: F 33 2/ F 41 1, F 90 0  Medications: as per medication list     âI certify that the continuation of Partial Hospitalization services is medically necessary to improve and/or maintain the patient's condition and functional level, and to prevent relapse or hospitalization, and that this could not be done at a less intensive level of care  â     Physician Signature: John Oliver MD     Nurse Signature: Erenest Baumgarten, RN      Chief Complaint  This is a 41year-old female who came for follow up [facility] from [facility]16 to 5 /19/16 due to severe depression and anxiety  History of Present Illness  The patient is a 41year-old female, has a prior history of Depression and FORTINO , referred from [facility] psychiatric unit where she was admitted from 5/9/16 to 5 /19/16 due to severe depression and anxiety for the last 2 weeks, was feeling hopeless and has multiple panic episodes  She was missing work, cries often  She has sleep difficulties and poor concentration  She was feeling more depressed after her fiancee moved out of the house  She denies any p history of manic episodes  Today she is anxious, denies any suicidal or homicidal ideation, plan or intent  She denies any psychotic symptoms  Her PHQ-9 is 22        Review of Systems  depression, interpersonal relationship problems and decreased functioning ability  Constitutional: No fever, no chills, no recent weight gain or recent weight loss  ENT: no ear ache, no loss of hearing, no nosebleeds or nasal discharge, no sore throat or hoarseness  Cardiovascular: no complaints of slow or fast heart rate, no chest pain, no palpitations, no leg claudication or lower extremity edema  Respiratory: no complaints of shortness of breath, no wheezing, no dyspnea on exertion, no orthopnea or PND  Gastrointestinal: no complaints of abdominal pain, no constipation, no nausea or diarrhea, no vomiting, no bloody stools  Genitourinary: no complaints of dysuria, no incontinence, no pelvic pain, no dysmenorrhea, no vaginal discharge or abnormal vaginal bleeding  Musculoskeletal: no complaints of arthralgia, no myalgia, no joint swelling or stiffness, no limb pain or swelling  Integumentary: no complaints of skin rash or lesion, no itching or dry skin, no skin wounds  Neurological: no complaints of headache, no confusion, no numbness or tingling, no dizziness or fainting  Other Symptoms: Endocrine is negative  ROS reviewed  Past Psychiatric History    Past Psychiatric History: The patient has prior history of Depression and FORTINO  Has other inpatient psychiatric admission in her 27's , follow up with Dr Sylvester Laguerre  She has been on Pritiqe, Zoloft, Effexor, Sonata, Vivance, Clonidine among others  No history of suicide attempts or violence  Substance Abuse Hx    Substance Abuse History: The patient denies any alcohol, drugs or tobacco           Active Problems    1  Allergic rhinitis (477 9) (J30 9)   2  Benign essential hypertension (401 1) (I10)   3  BMI 31 0-31 9,adult (V85 31) (Z68 31)   4  Cervicalgia (723 1) (M54 2)   5  Constipation (564 00) (K59 00)   6  Dysfunction of eustachian tube, unspecified laterality (381 81) (H69 80)   7   Encounter for screening mammogram for malignant neoplasm of breast (V76 12)   (Z12 31) 8  Genital herpes simplex (054 10) (A60 00)   9  Headache (784 0) (R51)   10  Hip pain, unspecified laterality   11  Lumbago (724 2) (M54 5)   12  Lyme disease (088 81) (A69 20)   13  Motion sickness (994 6) (T75 3XXA)   14  Obesity, Class I, BMI 30-34 9 (278 00) (E66 9)   15  Other muscle spasm (728 85) (M62 838)   16  Tachycardia (785 0) (R00 0)   17  Thyroid disorder (246 9) (E07 9)   18  Well adult on routine health check (V70 0) (Z00 00)    Past Medical History    1  History of Acute bronchitis (466 0) (J20 9)   2  History of Acute pharyngitis (462) (J02 9)   3  History of Acute sinusitis (461 9) (J01 90)   4  History of Acute sinusitis (461 9) (J01 90)   5  History of Acute tonsillitis (463) (J03 90)   6  History of Acute upper respiratory infection (465 9) (J06 9)   7  History of Acute upper respiratory infection (465 9) (J06 9)   8  History of Acute UTI (599 0) (N39 0)   9  History of Cervicalgia (723 1) (M54 2)   10  History of acute pharyngitis (V12 69) (Z87 09)   11  History of migraine (V12 49) (Z86 69)   12  History of Nonallopathic lesion (739 9) (M99 9)   13  History of Palpitations (785 1) (R00 2)   14  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)   15  History of Shoulder joint pain, unspecified laterality   16  Urinary tract infection (599 0) (N39 0)    The active problems and past medical history were reviewed and updated today  Surgical History    The surgical history was reviewed and updated today  Allergies    1  Percocet TABS    Current Meds   1  Albuterol 90 MCG/ACT AERS; INHALE 1 TO 2 PUFFS EVERY 4 TO 6 HOURS AS   NEEDED; Therapy: (Recorded:52Lpz9026) to Recorded   2  ClonazePAM 1 MG Oral Tablet; TAKE 1 TABLET Twice daily; Therapy: 13IZQ8392 to  Requested for: 13COQ5887 Recorded   3  Griselda 0 35 MG Oral Tablet; TAKE 1 TABLET DAILY; Therapy: 25JOO2274 to Recorded   4   Flonase 50 MCG/ACT Nasal Suspension (Fluticasone Propionate); USE 1 SPRAY IN   EACH NOSTRIL ONCE DAILY; Therapy: (Recorded:67Soq8339) to Recorded   5  HydrOXYzine HCl - 25 MG Oral Tablet; TAKE 1 TO 2 TABLETS AT BEDTIME Recorded   6  Metoprolol Succinate ER 50 MG Oral Tablet Extended Release 24 Hour; TAKE 1 TABLET   DAILY; Therapy: 05TIF4310 to (Evaluate:16Oct2016)  Requested for: 19Apr2016; Last   Rx:19Apr2016 Ordered   7  Multivitamins TABS; 1 Every Day; Therapy: 73XUK4905 to  Requested for: 25PIB0772 Recorded   8  Pristiq 100 MG Oral Tablet Extended Release 24 Hour; TAKE 1 TABLET Daily; Therapy: 49QAQ6078 to  Requested for: 20LGH6771 Recorded   9  Vyvanse 50 MG Oral Capsule; 1 capsule at 2:00pm;   Therapy: 98QTH9500 to  Requested for: 04KJT9255 Recorded   10  Vyvanse 60 MG Oral Capsule; TAKE 1 CAPSULE Daily; Therapy: (Recorded:20May2016) to Recorded   11  Wellbutrin  MG Oral Tablet Extended Release 24 Hour (BuPROPion HCl ER (XL)); Take 1 tablet daily; Therapy: (Recorded:20May2016) to Recorded   12  Wellbutrin  MG Oral Tablet Extended Release 24 Hour (BuPROPion HCl ER (XL));    1 Every Day; Therapy: 82VRN0440 to  Requested for: 54BET9776 Recorded   13  Zaleplon 10 MG Oral Capsule; TAKE 1 CAPSULE AT BEDTIME; Therapy: (Recorded:22Uea8392) to Recorded    The medication list was reviewed and updated today  Family Psych History  Son    1  Family history of attention deficit hyperactivity disorder (ADHD) (V17 0) (Z81 8)  Sister    2  Family history of Bipolar 1 disorder  Family History    3  Family history of Asbestosis   4  Family history of Cancer   5  Family history of Diabetes Mellitus (V18 0)   6  Family history of Emphysema   7  Family history of Glaucoma   8  Family history of Heart Disease (V17 49)   9  Family history of Hypertension (V17 49)   10  Family history of Mixed Hyperlipoproteinemia  Positive for bipolar in her sister, Son has ADHD  Noel Oliva The family history was reviewed and updated today         Social History    · College graduate   ·    · Employed   · Never A Smoker   · No history of alcohol use  The social history was reviewed and updated today  The patient is , 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, works as a teacher, college education   No legal problems  No   History Of Phys/Sex Abuse Or Perpetration    History Of Phys/Sex Abuse or Perpetration: She denies any history of physical abuse and sexual abuse  Vitals  Signs [Data Includes: Current Encounter]   Recorded: [id number] 09:53AM   Temperature: 98 2 F, Oral  Heart Rate: 84, L Radial  Pulse Quality: Normal, L Radial  Respiration: 16  Respiration Quality: Normal  Systolic: 009, LUE, Sitting  Diastolic: 76, LUE, Sitting  Height: 4 ft 10 in  Weight: 154 lb 2 oz  BMI Calculated: 32 21  BSA Calculated: 1 63    Physical Exam    Appearance: was calm and cooperative and good eye contact  Observed mood: depressed  Observed mood: affect was constricted  Speech: a normal rate  Thought processes: coherent/organized  Hallucinations: no hallucinations present  Thought Content: no delusions  Abnormal Thoughts: The patient has no suicidal thoughts and no homicidal thoughts  Orientation: The patient is oriented to person, place and time  Recent and Remote Memory: short term memory impaired and long term memory intact  Attention Span And Concentration: concentration impaired  Insight: Limited insight  Judgment: Her judgment was limited  Muscle Strength And Tone  Muscle strength and tone were normal  Normal gait and station  Language: no difficulty naming common objects, no difficulty repeating a phrase and no difficulty writing a sentence  Fund of knowledge: Patient displays adequate knowledge of current events, adequate fund of knowledge regarding past history and adequate fund of knowledge regarding vocabulary  The patient is experiencing no localized pain  On a scale of 0 - 10 the pain severity is a 0  Treatment Recommendations: 1  Admit to [place] 2   Medication Management 3  Group Therapy  Risks, Benefits And Possible Side Effects Of Medications: Risks, benefits, and possible side effects of medications explained to patient and patient verbalizes understanding  DSM    Provisional Diagnosis: Major depressive disorder recurrent severe without psychotic feature  Generalized Anxiety Disorder  Attention deficit Inattentive Type Disorder  End of Encounter Meds    1  Vyvanse 50 MG Oral Capsule; 1 capsule at 2:00pm;   Therapy: 59HPX0882 to  Requested for: 72BHG7873 Recorded   2  Vyvanse 60 MG Oral Capsule; TAKE 1 CAPSULE Daily; Therapy: (Recorded:20May2016) to Recorded    3  Albuterol 90 MCG/ACT AERS; INHALE 1 TO 2 PUFFS EVERY 4 TO 6 HOURS AS   NEEDED; Therapy: (Recorded:20May2016) to Recorded   4  Flonase 50 MCG/ACT Nasal Suspension (Fluticasone Propionate); USE 1 SPRAY IN   EACH NOSTRIL ONCE DAILY; Therapy: (Recorded:20May2016) to Recorded    5  Metoprolol Succinate ER 50 MG Oral Tablet Extended Release 24 Hour; TAKE 1 TABLET   DAILY; Therapy: 86DMN0183 to (Evaluate:16Oct2016)  Requested for: 39Utl9940; Last   Rx:19Apr2016 Ordered    6  ClonazePAM 1 MG Oral Tablet; TAKE 1 TABLET Twice daily; Therapy: 70VQS1805 to  Requested for: 28ERP1446 Recorded    7  Pristiq 100 MG Oral Tablet Extended Release 24 Hour; TAKE 1 TABLET Daily; Therapy: 36MOO9284 to  Requested for: 01LGD0456 Recorded   8  Wellbutrin  MG Oral Tablet Extended Release 24 Hour (BuPROPion HCl ER (XL)); Take 1 tablet daily; Therapy: (Recorded:20May2016) to Recorded   9  Zaleplon 10 MG Oral Capsule; TAKE 1 CAPSULE AT BEDTIME; Therapy: (Recorded:20May2016) to Recorded    10  Griselda 0 35 MG Oral Tablet; TAKE 1 TABLET DAILY; Therapy: 48GBU4040 to Recorded   11  HydrOXYzine HCl - 25 MG Oral Tablet; TAKE 1 TO 2 TABLETS AT BEDTIME Recorded   12  Multivitamins TABS; 1 Every Day; Therapy: 26AFH8565 to  Requested for: 06PEV8622 Recorded   13   Wellbutrin  MG Oral Tablet Extended Release 24 Hour (BuPROPion HCl ER (XL));    1 Every Day;     Therapy: 63QLC4644 to  Requested for: 58GWK1726 Recorded    Future Appointments    Date/Time Provider Specialty Site   05/24/2016 03:45 PM Dariel Koch[address]   Electronically signed by : GEORGETTE Tobar ; May 20 2016 10:49AM EST                       (Author)    Electronically signed by : Montana Clark RN; May 20 2016 10:56AM EST                       (Author)    Electronically signed by : GEORGETTE Tobar ; May 20 2016 10:58AM EST                       (Author)    Electronically signed by : GEORGETTE Tobar ; May 20 2016 10:59AM EST                       (Author)

## 2018-01-16 NOTE — PSYCH
History of Present Illness  [facility] Clinical Progress Note [facility]:   Specialized Services Documentation - Therapist must complete separate progress note for each specific clinical activity in which the client participated during the day  Case Management Note: Individual Case Management visit not provided today  1503 Excused due to IOP status  [facility]      Active Problems    1  ADHD, predominantly inattentive type (314 00) (F90 0)   2  Allergic rhinitis (477 9) (J30 9)   3  Anxiety and depression (300 00,311) (F41 9,F32 9)   4  Benign essential hypertension (401 1) (I10)   5  BMI 31 0-31 9,adult (V85 31) (Z68 31)   6  Cervicalgia (723 1) (M54 2)   7  Constipation (564 00) (K59 00)   8  Dysfunction of eustachian tube, unspecified laterality (381 81) (H69 80)   9  Encounter for screening mammogram for malignant neoplasm of breast (V76 12)   (Z12 31)   10  FORTINO (generalized anxiety disorder) (300 02) (F41 1)   11  Genital herpes simplex (054 10) (A60 00)   12  Headache (784 0) (R51)   13  Hip pain, unspecified laterality   14  Hypokalemia (276 8) (E87 6)   15  Lumbago (724 2) (M54 5)   16  Lyme disease (088 81) (A69 20)   17  Motion sickness (994 6) (T75 3XXA)   18  Obesity, Class I, BMI 30-34 9 (278 00) (E66 9)   19  Other muscle spasm (728 85) (M62 838)   20  Severe episode of recurrent major depressive disorder, without psychotic features    (296 33) (F33 2)   21  Tachycardia (785 0) (R00 0)   22  Thyroid disorder (246 9) (E07 9)   23  Well adult on routine health check (V70 0) (Z00 00)    Past Medical History    1  History of Acute bronchitis (466 0) (J20 9)   2  History of Acute pharyngitis (462) (J02 9)   3  History of Acute sinusitis (461 9) (J01 90)   4  History of Acute sinusitis (461 9) (J01 90)   5  History of Acute tonsillitis (463) (J03 90)   6  History of Acute upper respiratory infection (465 9) (J06 9)   7   History of Acute upper respiratory infection (405 0) (J06 9)   8  History of Acute UTI (599 0) (N39 0)   9  History of Cervicalgia (723 1) (M54 2)   10  Denied: History of Head trauma   11  History of acute pharyngitis (V12 69) (Z87 09)   12  History of migraine (V12 49) (Z86 69)   13  History of Nonallopathic lesion (739 9) (M99 9)   14  History of Palpitations (785 1) (R00 2)   15  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)   16  Denied: History of Seizure   17  History of Shoulder joint pain, unspecified laterality   18  Urinary tract infection (599 0) (N39 0)    Allergies    1  Percocet TABS    Current Meds   1  Albuterol 90 MCG/ACT AERS; INHALE 1 TO 2 PUFFS EVERY 4 TO 6 HOURS AS   NEEDED; Therapy: (Recorded:57Kcc8398) to Recorded   2  BuPROPion HCl ER (XL) 300 MG Oral Tablet Extended Release 24 Hour; TAKE 1   TABLET Daily thity tablets; Therapy: 05YKQ1980 to (Evaluate:23Jun2016)  Requested for: 66ZTW0913; Last   Rx:24May2016 Ordered   3  CloNIDine HCl - 0 1 MG Oral Tablet; Take one tablet PO  AT 6:00 PM and 1 tablet PO AT   9:00 pm;   Therapy: (Recorded:15Nwi5135) to Recorded   4  Griselda 0 35 MG Oral Tablet; TAKE 1 TABLET DAILY; Therapy: 48JNK9733 to Recorded   5  Flonase 50 MCG/ACT SUSP; USE 1 SPRAY IN EACH NOSTRIL ONCE DAILY; Therapy: (Recorded:25Xhk9212) to Recorded   6  Gabapentin 300 MG Oral Capsule; TAKE 3 CAPSULE Bedtime; Last Rx:41Pjd3612   Ordered   7  HydrOXYzine HCl - 50 MG Oral Tablet; TAKE 2 TABLET Bedtime; Last Rx:19Wpa1331   Ordered   8  Metoprolol Succinate ER 50 MG Oral Tablet Extended Release 24 Hour; TAKE 1 TABLET   DAILY; Therapy: 16WAQ8667 to (Evaluate:16Oct2016)  Requested for: 37TTM5567; Last   Rx:31Iry9618 Ordered   9  Multivitamins TABS; 1 Every Day; Therapy: 43PPT9907 to  Requested for: 20IMK2043 Recorded   10  Pristiq 100 MG Oral Tablet Extended Release 24 Hour; Take 1 tablet daily; Therapy: 51MSY6027 to  Requested for: 55EHM7438 Recorded   11   Vyvanse 50 MG Oral Capsule; 1 capsule at 2:00pm;    Therapy: 62IIU1223 to Requested for: 21AUL1402 Recorded   12  Vyvanse 60 MG Oral Capsule; take 1 capsule daily; Therapy: (Recorded:15Skt9400) to Recorded   13  Zaleplon 10 MG Oral Capsule; TAKE 1 TO 2 CAPSULES AT BEDTIME AS NEEDED; Last    Rx:36Mvs5278 Ordered    Family Psych History  Son    1  Family history of attention deficit hyperactivity disorder (ADHD) (V17 0) (Z81 8)  Sister    2  Family history of Bipolar 1 disorder  Family History    3  Family history of Asbestosis   4  Family history of Cancer   5  Family history of Diabetes Mellitus (V18 0)   6  Family history of Emphysema   7  Family history of Glaucoma   8  Family history of Heart Disease (V17 49)   9  Family history of Hypertension (V17 49)   10  Family history of Mixed Hyperlipoproteinemia    Social History    · College graduate   ·    · Employed   · Never A Smoker   · No history of alcohol use    Future Appointments    Date/Time Provider Specialty Site   10/06/2016 12:00 PM Julien Capps M D  Psychiatry [facility] PARTIAL HOSPITALIZATION   10/07/2016 10:00 AM GEORGETTE Marie   Psychiatry [facility] PARTIAL HOSPITALIZATION     Signatures   Electronically signed by : FAYE Pollack; Oct  5 2016  3:03PM EST                       (Author)

## 2018-01-16 NOTE — PSYCH
History of Present Illness  [facility] Clinical Progress Note [facility]:   Specialized Services Documentation - Therapist must complete separate progress note for each specific clinical activity in which the client participated during the day  Case Management Note:   0800 Patient does not attend [facility] today as it is IOP day off  Pt will return tommorrow  Medications not changed/added/denied  Karla Ng RN      Active Problems    1  ADHD, predominantly inattentive type (314 00) (F90 0)   2  Allergic rhinitis (477 9) (J30 9)   3  Anxiety and depression (300 00,311) (F41 9,F32 9)   4  Benign essential hypertension (401 1) (I10)   5  BMI 31 0-31 9,adult (V85 31) (Z68 31)   6  Cervicalgia (723 1) (M54 2)   7  Constipation (564 00) (K59 00)   8  Dysfunction of eustachian tube, unspecified laterality (381 81) (H69 80)   9  Encounter for screening mammogram for malignant neoplasm of breast (V76 12)   (Z12 31)   10  FORTINO (generalized anxiety disorder) (300 02) (F41 1)   11  Genital herpes simplex (054 10) (A60 00)   12  Headache (784 0) (R51)   13  Hip pain, unspecified laterality   14  Hypokalemia (276 8) (E87 6)   15  Lumbago (724 2) (M54 5)   16  Lyme disease (088 81) (A69 20)   17  Motion sickness (994 6) (T75 3XXA)   18  Obesity, Class I, BMI 30-34 9 (278 00) (E66 9)   19  Other muscle spasm (728 85) (M62 838)   20  Severe episode of recurrent major depressive disorder, without psychotic features    (296 33) (F33 2)   21  Tachycardia (785 0) (R00 0)   22  Thyroid disorder (246 9) (E07 9)   23  Well adult on routine health check (V70 0) (Z00 00)    Past Medical History    1  History of Acute bronchitis (466 0) (J20 9)   2  History of Acute pharyngitis (462) (J02 9)   3  History of Acute sinusitis (461 9) (J01 90)   4  History of Acute sinusitis (461 9) (J01 90)   5  History of Acute tonsillitis (463) (J03 90)   6  History of Acute upper respiratory infection (465 9) (J06 9)   7   History of Acute upper respiratory infection (465 9) (J06 9)   8  History of Acute UTI (599 0) (N39 0)   9  History of Cervicalgia (723 1) (M54 2)   10  History of acute pharyngitis (V12 69) (Z87 09)   11  History of migraine (V12 49) (Z86 69)   12  History of Nonallopathic lesion (739 9) (M99 9)   13  History of Palpitations (785 1) (R00 2)   14  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)   15  History of Shoulder joint pain, unspecified laterality   16  Urinary tract infection (599 0) (N39 0)    Allergies    1  Percocet TABS    Current Meds   1  Albuterol 90 MCG/ACT AERS; INHALE 1 TO 2 PUFFS EVERY 4 TO 6 HOURS AS   NEEDED; Therapy: (Recorded:34Ekh1116) to Recorded   2  BuPROPion HCl ER (XL) 300 MG Oral Tablet Extended Release 24 Hour; TAKE 1   TABLET Daily thity tablets; Therapy: 08GWT7696 to (Evaluate:23Jun2016)  Requested for: 47THO8627; Last   Rx:24May2016 Ordered   3  ClonazePAM 1 MG Oral Tablet; TAKE 1 TABLET AT BEDTIME; Therapy: 03NSV6416 to (Evaluate:99Jau7207); Last Rx:10Jun2016 Ordered   4  Griselda 0 35 MG Oral Tablet; TAKE 1 TABLET DAILY; Therapy: 34ZCP0468 to Recorded   5  Flonase 50 MCG/ACT SUSP; USE 1 SPRAY IN EACH NOSTRIL ONCE DAILY; Therapy: (Recorded:77Qfm2935) to Recorded   6  HydrOXYzine HCl - 25 MG Oral Tablet; TAKE 1 TO 2 TABLETS AT BEDTIME; Last   Rx:24May2016 Ordered   7  Metoprolol Succinate ER 50 MG Oral Tablet Extended Release 24 Hour; TAKE 1 TABLET   DAILY; Therapy: 69FXZ5534 to (Evaluate:16Oct2016)  Requested for: 80IFK6406; Last   Rx:19Apr2016 Ordered   8  Multivitamins TABS; 1 Every Day; Therapy: 02XZL3027 to  Requested for: 75ZZD5268 Recorded   9  Pristiq 100 MG Oral Tablet Extended Release 24 Hour; Take 1 tablet daily; Therapy: 16XCT2238 to  Requested for: 67GOZ9312 Recorded   10  Vyvanse 50 MG Oral Capsule; 1 capsule at 2:00pm;    Therapy: 89ZDU1728 to  Requested for: 00BHY0133 Recorded   11  Vyvanse 60 MG Oral Capsule; take 1 capsule daily;     Therapy: (Recorded:76Xow3523) to Recorded   12  Zaleplon 10 MG Oral Capsule; TAKE 1 TO 2 CAPSULES AT BEDTIME AS NEEDED; Last    SL:87VGJ1364 Ordered    Family Psych History  Son    1  Family history of attention deficit hyperactivity disorder (ADHD) (V17 0) (Z81 8)  Sister    2  Family history of Bipolar 1 disorder  Family History    3  Family history of Asbestosis   4  Family history of Cancer   5  Family history of Diabetes Mellitus (V18 0)   6  Family history of Emphysema   7  Family history of Glaucoma   8  Family history of Heart Disease (V17 49)   9  Family history of Hypertension (V17 49)   10   Family history of Mixed Hyperlipoproteinemia    Social History    · College graduate   ·    · Employed   · Never A Smoker   · No history of alcohol use    Future Appointments    Date/Time Provider Specialty Site   07/14/2016 11:00 AM Sweetie Porras     Signatures   Electronically signed by : Mimi Cade RN; Jun 28 2016  3:54PM EST                       (Author)
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
## 2018-01-16 NOTE — PSYCH
History of Present Illness  [facility]:   Specialized Services Documentation - Therapist must complete separate progress note for each specific clinical activity in which the client participated during the day  (834) Group Psychotherapy: (10:45-11:45) Neena participated in psychotherapy group focused on setting boundaries in relationships and adjusting to a new lifestyle when dealing with illness  Neena identified her relationship as a stressor  June Small was actively engaged in group discussion, providing input into discussion about lifestyle changes and health conditions, as well as offering support to peers about how to accept limitations in relationships  Although she provided much input into group discussion, she related very little of the content to her own personal issues  Minimal progress toward goals noted  Continue psychotherapy group to encourage Neena to explore stressors and healthy ways of coping  Treatment Plan Problem(s): 1 1, 1 2  Odell Sue MSW, LSW     (282) Group Psychotherapy: [id number] Neena participated in wellness group focused on learning about the role of light and change of seasons on mood and SAD (Seasonal Affective Disorder ) Neena shared that it is difficult for her children to get up for school, especially her teen daughter and that a lot of research has been done on improving school schedules so teenagers start school later as they need the additional sleep in the morning but that nothing has changed in response to these research findings  Neena did not share her personal experiences with light affecting mood except to say that it is difficult for her to get up in the morning when it is dark  June Small made slow progress toward goal  Continue group to educate and provide opportunities to learn and practice cognitive and behavioral strategies to positively improve mood in low light and adverse weather environments  Treatment Plan Problem(s): 1 1,1 2   Annabelle Gomez Bob Don, RN       (186) Education Therapy Goals set - review "Let - Go" handout    Treatment Plan Problem(s): 1 2  Education Therapy Time - 0900 - 0930 Previous goal was met  Readiness to Learning:  She is receptive to learning  There are  no barriers to learning  Learning Assessment Time - 1330 - 1400   Education completed on  illness and wellness tools  The teaching method was  verbal and demonstration  Shared area of learning: Yes  FAYE Champagne     (717) Allied Therapy 7027-7193 Neena actively shared in relaxation group focused on skill development and the concept of âletting goâ  She engaged in therapist led exercises  During group discussion on âletting goâ, she participated in group reading and offered feedback related to patterns of enabling self and others that can lead to what one justifies as âprotectionâ but leads to isolation  Group reinforced importance of consistently caring for one's self and use of strategies explored to refocus to the present  Some effort toward goal noted  Continue AT to increase skill awareness and use of skills consistently  Treatment Plan Problem(s): 1 1, 1 2  FAYE Champagne     ( ) Other [phone] with Torrey Turner at [address] - 3 additional days through 9/12/16 with review on 9/12/16 at noon  [phone]  FAYE Champagne     Case Management Note:   2718-8067 Spoke with Neena  She reported not coming in yesterday due to upset stomach and a headache  She reported she ran out of her Clonidine and missed 2 or 3 doses, but did refill yesterday afternoon  She was reminded to make sure she is hydrated and taking medication as prescribed as she noted some lower BP and higher pulse rate this morning   She wanted information on neuropsych testing - she was given information, yet this speaks to her limited judgement and insight as she is focused on these requests, yet over weekend spent Sunday in her pajamas and did not leave home and is only averaging 1 meal a day  She did contact her children yesterday on their first day back to school and does feel glad that she "cares" that she is not there versus anhedonia  She also reports reconnecting with her boyfriend  Discussed her lack of support leads her to latch on to him - she reports "paranoia" prevents her from making friends  Neena explained that for "many years" she has felt 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 wanted her out and she is afraid to do things in the community because if "she can volunteer, why can't she work" and she fears running into people at support groups - reinforced she look outside of her direct community for book clubs, support groups or volunteering  She did begin to work on treatment plan goals, but inconsistent with follow through  UR outcome shared with Neena  TREATMENT SESSION NUMBER: 4   Current suicide risk is low  Medications not changed/added/denied  Rohini Susanna, [facility]      Active Problems    1  ADHD, predominantly inattentive type (314 00) (F90 0)   2  Allergic rhinitis (477 9) (J30 9)   3  Anxiety and depression (300 00,311) (F41 9,F32 9)   4  Benign essential hypertension (401 1) (I10)   5  BMI 31 0-31 9,adult (V85 31) (Z68 31)   6  Cervicalgia (723 1) (M54 2)   7  Constipation (564 00) (K59 00)   8  Dysfunction of eustachian tube, unspecified laterality (381 81) (H69 80)   9  Encounter for screening mammogram for malignant neoplasm of breast (V76 12)   (Z12 31)   10  FORTINO (generalized anxiety disorder) (300 02) (F41 1)   11  Genital herpes simplex (054 10) (A60 00)   12  Headache (784 0) (R51)   13  Hip pain, unspecified laterality   14  Hypokalemia (276 8) (E87 6)   15  Lumbago (724 2) (M54 5)   16  Lyme disease (088 81) (A69 20)   17  Motion sickness (994 6) (T75 3XXA)   18  Obesity, Class I, BMI 30-34 9 (278 00) (E66 9)   19  Other muscle spasm (728 85) (M62 838)   20   Severe episode of recurrent major depressive disorder, without psychotic features    (296 33) (F33 2) 21  Tachycardia (785 0) (R00 0)   22  Thyroid disorder (246 9) (E07 9)   23  Well adult on routine health check (V70 0) (Z00 00)    Past Medical History    1  History of Acute bronchitis (466 0) (J20 9)   2  History of Acute pharyngitis (462) (J02 9)   3  History of Acute sinusitis (461 9) (J01 90)   4  History of Acute sinusitis (461 9) (J01 90)   5  History of Acute tonsillitis (463) (J03 90)   6  History of Acute upper respiratory infection (465 9) (J06 9)   7  History of Acute upper respiratory infection (465 9) (J06 9)   8  History of Acute UTI (599 0) (N39 0)   9  History of Cervicalgia (723 1) (M54 2)   10  Denied: History of Head trauma   11  History of acute pharyngitis (V12 69) (Z87 09)   12  History of migraine (V12 49) (Z86 69)   13  History of Nonallopathic lesion (739 9) (M99 9)   14  History of Palpitations (785 1) (R00 2)   15  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)   16  Denied: History of Seizure   17  History of Shoulder joint pain, unspecified laterality   18  Urinary tract infection (599 0) (N39 0)    Allergies    1  Percocet TABS    Current Meds   1  Albuterol 90 MCG/ACT AERS; INHALE 1 TO 2 PUFFS EVERY 4 TO 6 HOURS AS   NEEDED; Therapy: (Recorded:02Tah9958) to Recorded   2  BuPROPion HCl ER (XL) 300 MG Oral Tablet Extended Release 24 Hour; TAKE 1   TABLET Daily thity tablets; Therapy: 60SHD2933 to (Evaluate:23Jun2016)  Requested for: 31CZP7233; Last   Rx:57Bal3089 Ordered   3  CloNIDine HCl - 0 1 MG Oral Tablet; Take one tablet PO  AT 6:00 PM and 1 tablet PO AT   9:00 pm;   Therapy: (Recorded:12Xyl7296) to Recorded   4  Griselda 0 35 MG Oral Tablet; TAKE 1 TABLET DAILY; Therapy: 68SIO6101 to Recorded   5  Flonase 50 MCG/ACT SUSP; USE 1 SPRAY IN EACH NOSTRIL ONCE DAILY; Therapy: (Recorded:95Jbz1492) to Recorded   6  Gabapentin 300 MG Oral Capsule; TAKE 3 CAPSULE Bedtime; Therapy: (Recorded:16Lpy5353) to Recorded   7   HydrOXYzine HCl - 25 MG Oral Tablet; TAKE 1 TO 2 TABLETS AT BEDTIME; Last   OP:57VAH8387 Ordered   8  Metoprolol Succinate ER 50 MG Oral Tablet Extended Release 24 Hour; TAKE 1 TABLET   DAILY; Therapy: 16KCH4646 to (Evaluate:38Pav5938)  Requested for: 87UHY1958; Last   Rx:30Dnt2906 Ordered   9  Multivitamins TABS; 1 Every Day; Therapy: 91DJV7216 to  Requested for: 72TCP6121 Recorded   10  Pristiq 100 MG Oral Tablet Extended Release 24 Hour; Take 1 tablet daily; Therapy: 31GOI7854 to  Requested for: 89GGT5119 Recorded   11  Vyvanse 50 MG Oral Capsule; 1 capsule at 2:00pm;    Therapy: 71NQJ4996 to  Requested for: 05SYH3204 Recorded   12  Vyvanse 60 MG Oral Capsule; take 1 capsule daily; Therapy: (Recorded:96Pni0421) to Recorded   13  Zaleplon 10 MG Oral Capsule; TAKE 1 TO 2 CAPSULES AT BEDTIME AS NEEDED; Last    OX:64ASD0872 Ordered    Family Psych History  Son    1  Family history of attention deficit hyperactivity disorder (ADHD) (V17 0) (Z81 8)  Sister    2  Family history of Bipolar 1 disorder  Family History    3  Family history of Asbestosis   4  Family history of Cancer   5  Family history of Diabetes Mellitus (V18 0)   6  Family history of Emphysema   7  Family history of Glaucoma   8  Family history of Heart Disease (V17 49)   9  Family history of Hypertension (V17 49)   10  Family history of Mixed Hyperlipoproteinemia    Social History    · College graduate   ·    · Employed   · Never A Smoker   · No history of alcohol use    Future Appointments    Date/Time Provider Specialty Site   09/08/2016 11:15 AM GEORGETTE Russell  Psychiatry [facility]   09/09/2016 11:30 AM GEORGETTE Russell   Psychiatry [facility]   09/20/2016 11:00 AM Paulette Hankins [place]     Signatures   Electronically signed by : JONATHAN Marie; Sep  7 2016  1:37PM EST                       (Author)    Electronically signed by : FAYE Pugh; Sep  7 2016  2:33PM EST (Author)    Electronically signed by : Floyd Oropeza RN; Sep  7 2016  4:06PM EST                       (Author)

## 2018-01-16 NOTE — PSYCH
History of Present Illness  [facility] Clinical Progress Note [facility]:   Specialized Services Documentation - Therapist must complete separate progress note for each specific clinical activity in which the client participated during the day  (915) Group Psychotherapy: [phone] participated in psychotherapy group dealing with themes of unhealthy relationships, boundaries and triggers  Neena offered supportive and constructive feedback to her peers regarding healthy boundaries in relationships and taking care of oneself  She also discussed stressor of dealing with her ex- and their changing living/ arrangments  Minimal progress noted toward goals  Continue to offer group to allow patient to explore stressors and ways of coping  Treatment Plan Problem(s): 1 1, 1 2  [facility] MSW, LSW     (088) Group Psychotherapy: [phone] [address] participated in wellness group focused on identifying one nutritional change patient can make for better health  Handout was reviewed including weight gain, dry mouth and other potential side effects of certain medications  Pt shared that she grew up with "frozen TV dinners" so she makes an effort not to serve them to her children  She did state that she tries to drink water throughout the day by bringing her water bottle with her wherever she goes  Peers discussed importance of drinking water to hydrate and curb appetite and pt identified that she needs to drink more water but has decreased her intake of soda this way  Pt made moderate progress toward goal  Continue group to educate patient on importance of nutritionally healthy diet and lifestyle in recovery from mental illness  Treatment Plan Problem(s): 1 1,1 2  Keyonna Trinh RN       (250) Education Therapy Goals set - practice relaxation    Treatment Plan Problem(s): 1 2  Education Therapy Time - 0900 - 0930 Previous goal was met  Readiness to Learning:  She is receptive to learning     There are  no barriers to learning  Learning Assessment Time - 1330 - 1400   Education completed on  illness and wellness tools  The teaching method was  verbal and demonstration  Shared area of learning: Yes  FAYE Eubanks     (568) Allied Therapy [phone] Neena actively shared in [facility] group focused on relaxation techniques and anxiety reduction  She was observed to be engaged in therapist led relaxation exercises  She reported potential benefits of use of strategies to calm down outside of program  Group discussed specific items that could help self soothe if in an âanxiety crisisâ with encouragement to use these things regularly to manage stressors consistently  Patient identified she would put a stuffed animal and art materials in her ârelaxation kitâ  Some positive effort noted toward tx goal  Continue AT to encourage development of wellness skills and consistent practice  Treatment Plan Problem(s): 1 2  FAYE Eubanks     ( ) Other [phone] Concurrent PHP review today with Joey Tejeda at [employer]  Three additional days authorized; June 2,3,4 with CM calling on Mon June 6th to review again for IOP  No additional PHP days will be authorized  IOP days may be authorized per Joey Tejeda and depending on patient's progress  Keith Nesbitt RN     Case Management Note:   [phone] [address] met with CM and signed JONATHAN for Maricruz Sevilla and requested a letter be written on letterhead and signed by MD and this writer to attest that she is attending PHP so her medical insurance can be continued  She requested letter be faxed to: [phone] and that she be given a copy  Completed today  Updated treatment plan discussed, reviewed and signed today  No AH, no VH, no SI, no HI  Pt has no complaints of SE from medications  Pt has not gone to Pharmacy to  her prescription of Sonata as yet she states she will do this evening  Updated treatment plan reviewed, signed and copy given to pt today     TREATMENT SESSION NUMBER: 7   Current suicide risk is low  Medications not changed/added/denied  Ishmael Landau, RN      Active Problems    1  ADHD, predominantly inattentive type (314 00) (F90 0)   2  Allergic rhinitis (477 9) (J30 9)   3  Anxiety and depression (300 00,311) (F41 9,F32 9)   4  Benign essential hypertension (401 1) (I10)   5  BMI 31 0-31 9,adult (V85 31) (Z68 31)   6  Cervicalgia (723 1) (M54 2)   7  Constipation (564 00) (K59 00)   8  Dysfunction of eustachian tube, unspecified laterality (381 81) (H69 80)   9  Encounter for screening mammogram for malignant neoplasm of breast (V76 12)   (Z12 31)   10  FORTINO (generalized anxiety disorder) (300 02) (F41 1)   11  Genital herpes simplex (054 10) (A60 00)   12  Headache (784 0) (R51)   13  Hip pain, unspecified laterality   14  Hypokalemia (276 8) (E87 6)   15  Lumbago (724 2) (M54 5)   16  Lyme disease (088 81) (A69 20)   17  Motion sickness (994 6) (T75 3XXA)   18  Obesity, Class I, BMI 30-34 9 (278 00) (E66 9)   19  Other muscle spasm (728 85) (M62 838)   20  Severe episode of recurrent major depressive disorder, without psychotic features    (296 33) (F33 2)   21  Tachycardia (785 0) (R00 0)   22  Thyroid disorder (246 9) (E07 9)   23  Well adult on routine health check (V70 0) (Z00 00)    Past Medical History    1  History of Acute bronchitis (466 0) (J20 9)   2  History of Acute pharyngitis (462) (J02 9)   3  History of Acute sinusitis (461 9) (J01 90)   4  History of Acute sinusitis (461 9) (J01 90)   5  History of Acute tonsillitis (463) (J03 90)   6  History of Acute upper respiratory infection (465 9) (J06 9)   7  History of Acute upper respiratory infection (465 9) (J06 9)   8  History of Acute UTI (599 0) (N39 0)   9  History of Cervicalgia (723 1) (M54 2)   10  History of acute pharyngitis (V12 69) (Z87 09)   11  History of migraine (V12 49) (Z86 69)   12  History of Nonallopathic lesion (739 9) (M99 9)   13   History of Palpitations (785 1) (R00 2) 14  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)   15  History of Shoulder joint pain, unspecified laterality   16  Urinary tract infection (599 0) (N39 0)    Allergies    1  Percocet TABS    Current Meds   1  Albuterol 90 MCG/ACT AERS; INHALE 1 TO 2 PUFFS EVERY 4 TO 6 HOURS AS   NEEDED; Therapy: (Recorded:92Jpe2762) to Recorded   2  BuPROPion HCl ER (XL) 300 MG Oral Tablet Extended Release 24 Hour; TAKE 1   TABLET Daily thity tablets; Therapy: 57UNW8075 to (Evaluate:23Jun2016)  Requested for: 23QCN7182; Last   Rx:02Yxj7154 Ordered   3  Griselda 0 35 MG Oral Tablet; TAKE 1 TABLET DAILY; Therapy: 34LJV9943 to Recorded   4  Flonase 50 MCG/ACT Nasal Suspension; USE 1 SPRAY IN EACH NOSTRIL ONCE   DAILY; Therapy: (Recorded:20May2016) to Recorded   5  HydrOXYzine HCl - 25 MG Oral Tablet; TAKE 1 TO 2 TABLETS AT BEDTIME; Last   Rx:64Trm9244 Ordered   6  Metoprolol Succinate ER 50 MG Oral Tablet Extended Release 24 Hour; TAKE 1 TABLET   DAILY; Therapy: 64KTH4911 to (Evaluate:16Oct2016)  Requested for: 03MBZ4364; Last   Rx:43Ytp7004 Ordered   7  Multivitamins TABS; 1 Every Day; Therapy: 37HOR6815 to  Requested for: 24KQM7817 Recorded   8  Pristiq 100 MG Oral Tablet Extended Release 24 Hour; Take 1 tablet daily; Therapy: 80SAX3912 to  Requested for: 90TTA1933 Recorded   9  Vyvanse 50 MG Oral Capsule; 1 capsule at 2:00pm;   Therapy: 52KTA8378 to  Requested for: 56RPN8282 Recorded   10  Vyvanse 60 MG Oral Capsule; take 1 capsule daily; Therapy: (Recorded:45Cig9788) to Recorded   11  Zaleplon 10 MG Oral Capsule; TAKE 1 TO 2 CAPSULES AT BEDTIME AS NEEDED; Last    XL:47QEV1524 Ordered    Family Psych History  Son    1  Family history of attention deficit hyperactivity disorder (ADHD) (V17 0) (Z81 8)  Sister    2  Family history of Bipolar 1 disorder  Family History    3  Family history of Asbestosis   4  Family history of Cancer   5  Family history of Diabetes Mellitus (V18 0)   6  Family history of Emphysema   7  Family history of Glaucoma   8  Family history of Heart Disease (V17 49)   9  Family history of Hypertension (V17 49)   10  Family history of Mixed Hyperlipoproteinemia    Social History    · College graduate   ·    · Employed   · Never A Smoker   · No history of alcohol use    Future Appointments    Date/Time Provider Specialty Site   06/03/2016 10:30 AM Ashtyn Enciso MD Psychiatry [facility]   06/02/2016 10:15 AM GEORGETTE Bush   Psychiatry [facility]     Signatures   Electronically signed by : JONATHAN Burton; Jun 1 2016 11:59AM EST                       (Author)    Electronically signed by : Atul Beck RN; Jun 1 2016 12:25PM EST                       (Author)    Electronically signed by : Atul Beck RN; Jun 1 2016 12:45PM EST                       (Author)    Electronically signed by : Atul Beck RN; Jun 1 2016  2:30PM EST                       (Author)    Electronically signed by : Atul Beck RN; Jun 1 2016  2:34PM EST                       (Author)    Electronically signed by : FAYE Castro; Jun 1 2016  3:15PM EST                       (Author)    Electronically signed by : Atul Beck RN; Jun 2 2016  2:52PM EST                       (Author)    Electronically signed by : Atul Beck RN; Jun 6 2016  2:29PM EST                       (Author)

## 2018-01-16 NOTE — PSYCH
History of Present Illness  [facility]:   Specialized Services Documentation - Therapist must complete separate progress note for each specific clinical activity in which the client participated during the day  Case Management Note:   0800 Neena did not attend Program today due to IOP status  Medications not changed/added/denied  Manuel Pressley RN      Active Problems    1  ADHD, predominantly inattentive type (314 00) (F90 0)   2  Allergic rhinitis (477 9) (J30 9)   3  Anxiety and depression (300 00,311) (F41 9,F32 9)   4  Benign essential hypertension (401 1) (I10)   5  BMI 31 0-31 9,adult (V85 31) (Z68 31)   6  Cervicalgia (723 1) (M54 2)   7  Constipation (564 00) (K59 00)   8  Dysfunction of eustachian tube, unspecified laterality (381 81) (H69 80)   9  Encounter for screening mammogram for malignant neoplasm of breast (V76 12)   (Z12 31)   10  FORTINO (generalized anxiety disorder) (300 02) (F41 1)   11  Genital herpes simplex (054 10) (A60 00)   12  Headache (784 0) (R51)   13  Hip pain, unspecified laterality   14  Hypokalemia (276 8) (E87 6)   15  Lumbago (724 2) (M54 5)   16  Lyme disease (088 81) (A69 20)   17  Motion sickness (994 6) (T75 3XXA)   18  Obesity, Class I, BMI 30-34 9 (278 00) (E66 9)   19  Other muscle spasm (728 85) (M62 838)   20  Severe episode of recurrent major depressive disorder, without psychotic features    (296 33) (F33 2)   21  Tachycardia (785 0) (R00 0)   22  Thyroid disorder (246 9) (E07 9)   23  Well adult on routine health check (V70 0) (Z00 00)    Past Medical History    1  History of Acute bronchitis (466 0) (J20 9)   2  History of Acute pharyngitis (462) (J02 9)   3  History of Acute sinusitis (461 9) (J01 90)   4  History of Acute sinusitis (461 9) (J01 90)   5  History of Acute tonsillitis (463) (J03 90)   6  History of Acute upper respiratory infection (465 9) (J06 9)   7   History of Acute upper respiratory infection (465 9) (J06 9)   8  History of Acute UTI (599 0) (N39 0)   9  History of Cervicalgia (723 1) (M54 2)   10  History of acute pharyngitis (V12 69) (Z87 09)   11  History of migraine (V12 49) (Z86 69)   12  History of Nonallopathic lesion (739 9) (M99 9)   13  History of Palpitations (785 1) (R00 2)   14  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)   15  History of Shoulder joint pain, unspecified laterality   16  Urinary tract infection (599 0) (N39 0)    Allergies    1  Percocet TABS    Current Meds   1  Albuterol 90 MCG/ACT AERS; INHALE 1 TO 2 PUFFS EVERY 4 TO 6 HOURS AS   NEEDED; Therapy: (Recorded:20May2016) to Recorded   2  BuPROPion HCl ER (XL) 300 MG Oral Tablet Extended Release 24 Hour (Wellbutrin XL);   TAKE 1 TABLET Daily thity tablets; Therapy: 00NAB0784 to (Evaluate:23Jun2016)  Requested for: 61CYE6794; Last   Rx:24May2016 Ordered   3  ClonazePAM 1 MG Oral Tablet; TAKE 1 TABLET AT BEDTIME; Therapy: 56DPO8989 to (Evaluate:05Qfm8882); Last Rx:10Jun2016 Ordered   4  Griselda 0 35 MG Oral Tablet; TAKE 1 TABLET DAILY; Therapy: 95UMH6769 to Recorded   5  Flonase 50 MCG/ACT SUSP (Fluticasone Propionate); USE 1 SPRAY IN EACH NOSTRIL   ONCE DAILY; Therapy: (Recorded:20May2016) to Recorded   6  HydrOXYzine HCl - 25 MG Oral Tablet; TAKE 1 TO 2 TABLETS AT BEDTIME; Last   Rx:24May2016 Ordered   7  Metoprolol Succinate ER 50 MG Oral Tablet Extended Release 24 Hour; TAKE 1 TABLET   DAILY; Therapy: 91CKY1283 to (Evaluate:16Oct2016)  Requested for: 70QCR9081; Last   Rx:19Apr2016 Ordered   8  Multivitamins TABS; 1 Every Day; Therapy: 23AFC6813 to  Requested for: 63RFW3719 Recorded   9  Pristiq 100 MG Oral Tablet Extended Release 24 Hour; Take 1 tablet daily; Therapy: 83IAY6723 to  Requested for: 88DOK0500 Recorded   10  Vyvanse 50 MG Oral Capsule; 1 capsule at 2:00pm;    Therapy: 93ZIW7285 to  Requested for: 61EFI1750 Recorded   11  Vyvanse 60 MG Oral Capsule; take 1 capsule daily;     Therapy: (Recorded:11Kvq9894) to Recorded   12  Zaleplon 10 MG Oral Capsule; TAKE 1 TO 2 CAPSULES AT BEDTIME AS NEEDED; Last    Rx:18Ejl9559 Ordered    Family Psych History  Son    1  Family history of attention deficit hyperactivity disorder (ADHD) (V17 0) (Z81 8)  Sister    2  Family history of Bipolar 1 disorder  Family History    3  Family history of Asbestosis   4  Family history of Cancer   5  Family history of Diabetes Mellitus (V18 0)   6  Family history of Emphysema   7  Family history of Glaucoma   8  Family history of Heart Disease (V17 49)   9  Family history of Hypertension (V17 49)   10  Family history of Mixed Hyperlipoproteinemia    Social History    · College graduate   ·    · Employed   · Never A Smoker   · No history of alcohol use    Future Appointments    Date/Time Provider Specialty Site   06/20/2016 10:15 AM GEORGETTE Rebollar  Psychiatry [facility]   06/22/2016 10:15 AM GEORGETTE Rebollar  Psychiatry [facility]   06/24/2016 10:15 AM GEORGETTE Rebollar   Psychiatry [facility]   07/14/2016 11:00 AM Paulette Hankins [place]     Signatures   Electronically signed by : Luz Elena Castillo RN; Jun 20 2016  9:49AM EST                       (Author)

## 2018-01-16 NOTE — PSYCH
History of Present Illness  [facility]:   Specialized Services Documentation - Therapist must complete separate progress note for each specific clinical activity in which the client participated during the day  (350) Group Psychotherapy: [phone] [address] participated in creating a Crisis Card according to a format listed on handout and to personalize with individual information  Also, an educational handout was shared to self-assess what additional information would be necessary to add to Crisis Card such as medications, warning signs and triggers  Neena shared that she has identified three distractions she can use when stressed or in crisis  She completed the Crisis Card with a minimal amount of sharing today  [address] made moderate progress toward goal     Treatment Plan Problem(s): 1 1,1 2  Daniela Pugh RN     (293) Group Psychotherapy: (9:30-10:30) Neena participated in group psychotherapy focused on work stressors and handling general fears  Neena identified her job and her mood swings as a stressor  She talked about how she feels she is not ready to go back to work because her moods are still so unstable, and 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%  She expressed worry about not being able to keep track of her children during transitions, and she fears that something would happen  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, and would like a position either with older grade 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 or teaching in college  However, she said that she has been pursuing a different position for years and has not been granted the opportunity, which she said frustrates her and contributes to her low self-esteem  Peers provided her support and feedback about alternatives to explore and prioritizing benefits and pension with being satisfied in a different position    Moderate progress toward goals   Continue psychotherapy group to encourage Neena to further explore stressors and healthy ways of coping  Treatment Plan Problem(s): 1 1, 1 2  [facility]       (418) Education Therapy Goals set - work on self-esteem handout    Treatment Plan Problem(s): 1 6  Education Therapy Time - 0900 - 0930 Previous goal was met  Readiness to Learning:  She is receptive to learning  There are  no barriers to learning  Learning Assessment Time - 1330 - 1400   Education completed on  illness and wellness tools  The teaching method was  verbal and written  Shared area of learning: Yes  FAYE Castro     (151) Allied Therapy 5100-9008 Neena actively shared in [facility] group focused on WRAP development and use  She engaged in instrument improvisation exploring triggers, warning signs and wellness tools  She shared that she was previously in program and used her WRAP to support wellness, but once discharged stopped reviewing it  She shared feeling had she been consistent with the tool âthings may have been differentâ  Group explored the benefits of WRAP development and strategies to using this tool to support ongoing recovery  Some progress noted toward goal  Continue AT to explore recovery strategies and use  Treatment Plan Problem(s): 1 2  FAYE Castro       Case Management Note:   4122-6203 IOP 1 Neena reported mood decline today and tearfulness yesterday  She reports it is hard for her to work on her own issues and easier for her to offer help to others  Encouraged her to pull out self-esteem exercises given yesterday  She identifies this seems 'so hard'  She was asked to recognize that whatever she chooses to work on - basic self-care, self-esteem, confidence building - that it will be hard and take consistency  She was asked to 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 and what can grow her confidence in herself and her life now  Mood and affect depressed/constricted     TREATMENT SESSION NUMBER: 8   Current suicide risk is low  Medications not changed/added/denied  FAYE Núñez   [facility] Follow-up Physician's Orders:   9/13/2016  8:35 AM IOP Today   MD Yajaira Carney, NUZHAT      Active Problems    1  ADHD, predominantly inattentive type (314 00) (F90 0)   2  Allergic rhinitis (477 9) (J30 9)   3  Anxiety and depression (300 00,311) (F41 9,F32 9)   4  Benign essential hypertension (401 1) (I10)   5  BMI 31 0-31 9,adult (V85 31) (Z68 31)   6  Cervicalgia (723 1) (M54 2)   7  Constipation (564 00) (K59 00)   8  Dysfunction of eustachian tube, unspecified laterality (381 81) (H69 80)   9  Encounter for screening mammogram for malignant neoplasm of breast (V76 12)   (Z12 31)   10  FORTINO (generalized anxiety disorder) (300 02) (F41 1)   11  Genital herpes simplex (054 10) (A60 00)   12  Headache (784 0) (R51)   13  Hip pain, unspecified laterality   14  Hypokalemia (276 8) (E87 6)   15  Lumbago (724 2) (M54 5)   16  Lyme disease (088 81) (A69 20)   17  Motion sickness (994 6) (T75 3XXA)   18  Obesity, Class I, BMI 30-34 9 (278 00) (E66 9)   19  Other muscle spasm (728 85) (M62 838)   20  Severe episode of recurrent major depressive disorder, without psychotic features    (296 33) (F33 2)   21  Tachycardia (785 0) (R00 0)   22  Thyroid disorder (246 9) (E07 9)   23  Well adult on routine health check (V70 0) (Z00 00)    Past Medical History    1  History of Acute bronchitis (466 0) (J20 9)   2  History of Acute pharyngitis (462) (J02 9)   3  History of Acute sinusitis (461 9) (J01 90)   4  History of Acute sinusitis (461 9) (J01 90)   5  History of Acute tonsillitis (463) (J03 90)   6  History of Acute upper respiratory infection (465 9) (J06 9)   7  History of Acute upper respiratory infection (465 9) (J06 9)   8  History of Acute UTI (599 0) (N39 0)   9  History of Cervicalgia (723 1) (M54 2)   10  Denied: History of Head trauma   11  History of acute pharyngitis (V12 69) (Z87 09)   12   History of migraine (V12 49) (Z86 69)   13  History of Nonallopathic lesion (739 9) (M99 9)   14  History of Palpitations (785 1) (R00 2)   15  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)   16  Denied: History of Seizure   17  History of Shoulder joint pain, unspecified laterality   18  Urinary tract infection (599 0) (N39 0)    Allergies    1  Percocet TABS    Current Meds   1  Albuterol 90 MCG/ACT AERS; INHALE 1 TO 2 PUFFS EVERY 4 TO 6 HOURS AS   NEEDED; Therapy: (Recorded:93Qtg0048) to Recorded   2  BuPROPion HCl ER (XL) 300 MG Oral Tablet Extended Release 24 Hour; TAKE 1   TABLET Daily thity tablets; Therapy: 23GLB3955 to (Evaluate:23Jun2016)  Requested for: 57UNI6050; Last   Rx:24May2016 Ordered   3  CloNIDine HCl - 0 1 MG Oral Tablet; Take one tablet PO  AT 6:00 PM and 1 tablet PO AT   9:00 pm;   Therapy: (Recorded:36Pno6737) to Recorded   4  Griselda 0 35 MG Oral Tablet; TAKE 1 TABLET DAILY; Therapy: 55VBW0709 to Recorded   5  Flonase 50 MCG/ACT SUSP; USE 1 SPRAY IN EACH NOSTRIL ONCE DAILY; Therapy: (Recorded:96Eiv0009) to Recorded   6  Gabapentin 300 MG Oral Capsule; TAKE 3 CAPSULE Bedtime; Therapy: (Recorded:85Cdv9818) to Recorded   7  HydrOXYzine HCl - 25 MG Oral Tablet; TAKE 1 TO 2 TABLETS AT BEDTIME; Last   Rx:28Zny2737 Ordered   8  Metoprolol Succinate ER 50 MG Oral Tablet Extended Release 24 Hour; TAKE 1 TABLET   DAILY; Therapy: 88ITA3944 to (Evaluate:16Oct2016)  Requested for: 97QTI0509; Last   Rx:19Apr2016 Ordered   9  Multivitamins TABS; 1 Every Day; Therapy: 98AZI6692 to  Requested for: 74ZKN4945 Recorded   10  Pristiq 100 MG Oral Tablet Extended Release 24 Hour; Take 1 tablet daily; Therapy: 09PUL1204 to  Requested for: 94LTC8993 Recorded   11  Vyvanse 50 MG Oral Capsule; 1 capsule at 2:00pm;    Therapy: 07PAC8523 to  Requested for: 11KXN4977 Recorded   12  Vyvanse 60 MG Oral Capsule; take 1 capsule daily; Therapy: (Recorded:79Znu7263) to Recorded   13   Zaleplon 10 MG Oral Capsule; TAKE 1 TO 2 CAPSULES AT BEDTIME AS NEEDED; Last    LO:86VBA7812 Ordered    Family Psych History  Son    1  Family history of attention deficit hyperactivity disorder (ADHD) (V17 0) (Z81 8)  Sister    2  Family history of Bipolar 1 disorder  Family History    3  Family history of Asbestosis   4  Family history of Cancer   5  Family history of Diabetes Mellitus (V18 0)   6  Family history of Emphysema   7  Family history of Glaucoma   8  Family history of Heart Disease (V17 49)   9  Family history of Hypertension (V17 49)   10  Family history of Mixed Hyperlipoproteinemia    Social History    · College graduate   ·    · Employed   · Never A Smoker   · No history of alcohol use    Future Appointments    Date/Time Provider Specialty Site   09/13/2016 11:00 AM GEORGETTE Chen  Psychiatry [facility]   09/14/2016 10:15 AM GEORGETTE Chen  Psychiatry [facility]   09/15/2016 10:45 AM GEORGETTE Chen  Psychiatry [facility]   09/16/2016 10:30 AM GEORGETTE Chen   Psychiatry [facility]   09/20/2016 11:00 AM [facility]     Signatures   Electronically signed by : GEORGETTE Mendieta ; Sep 13 2016  8:35AM EST                       (Author)    Electronically signed by : Manuel Pressley RN; Sep 13 2016  8:37AM EST                       (Author)    Electronically signed by : Manuel Pressley RN; Sep 13 2016  2:24PM EST                       (Author)    Electronically signed by : AFYE Valles; Sep 13 2016  2:35PM EST                       (Author)    Electronically signed by : JONATHAN Rodriguez; Sep 13 2016  3:24PM EST                       (Author)

## 2018-01-17 NOTE — PSYCH
History of Present Illness  [facility] Clinical Progress Note [facility]:   Specialized Services Documentation - Therapist must complete separate progress note for each specific clinical activity in which the client participated during the day  Case Management Note:   0800 Neena is not in Program today due to IOP attendance - off today  Medications not changed/added/denied  Krystina Liu RN      Active Problems    1  ADHD, predominantly inattentive type (314 00) (F90 0)   2  Allergic rhinitis (477 9) (J30 9)   3  Anxiety and depression (300 00,311) (F41 9,F32 9)   4  Benign essential hypertension (401 1) (I10)   5  BMI 31 0-31 9,adult (V85 31) (Z68 31)   6  Cervicalgia (723 1) (M54 2)   7  Constipation (564 00) (K59 00)   8  Dysfunction of eustachian tube, unspecified laterality (381 81) (H69 80)   9  Encounter for screening mammogram for malignant neoplasm of breast (V76 12)   (Z12 31)   10  FORTINO (generalized anxiety disorder) (300 02) (F41 1)   11  Genital herpes simplex (054 10) (A60 00)   12  Headache (784 0) (R51)   13  Hip pain, unspecified laterality   14  Hypokalemia (276 8) (E87 6)   15  Lumbago (724 2) (M54 5)   16  Lyme disease (088 81) (A69 20)   17  Motion sickness (994 6) (T75 3XXA)   18  Obesity, Class I, BMI 30-34 9 (278 00) (E66 9)   19  Other muscle spasm (728 85) (M62 838)   20  Severe episode of recurrent major depressive disorder, without psychotic features    (296 33) (F33 2)   21  Tachycardia (785 0) (R00 0)   22  Thyroid disorder (246 9) (E07 9)   23  Well adult on routine health check (V70 0) (Z00 00)    Past Medical History    1  History of Acute bronchitis (466 0) (J20 9)   2  History of Acute pharyngitis (462) (J02 9)   3  History of Acute sinusitis (461 9) (J01 90)   4  History of Acute sinusitis (461 9) (J01 90)   5  History of Acute tonsillitis (463) (J03 90)   6  History of Acute upper respiratory infection (465 9) (J06 9)   7   History of Acute upper respiratory infection (465 9) (J06 9)   8  History of Acute UTI (599 0) (N39 0)   9  History of Cervicalgia (723 1) (M54 2)   10  History of acute pharyngitis (V12 69) (Z87 09)   11  History of migraine (V12 49) (Z86 69)   12  History of Nonallopathic lesion (739 9) (M99 9)   13  History of Palpitations (785 1) (R00 2)   14  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)   15  History of Shoulder joint pain, unspecified laterality   16  Urinary tract infection (599 0) (N39 0)    Allergies    1  Percocet TABS    Current Meds   1  Albuterol 90 MCG/ACT AERS; INHALE 1 TO 2 PUFFS EVERY 4 TO 6 HOURS AS   NEEDED; Therapy: (Recorded:93Wcj3162) to Recorded   2  BuPROPion HCl ER (XL) 300 MG Oral Tablet Extended Release 24 Hour (Wellbutrin XL);   TAKE 1 TABLET Daily thity tablets; Therapy: 59PUV7284 to (Evaluate:23Jun2016)  Requested for: 03FCU2982; Last   Rx:24May2016 Ordered   3  ClonazePAM 1 MG Oral Tablet; TAKE 1 TABLET AT BEDTIME; Therapy: 33YFO7084 to (Evaluate:16Xty8062); Last Rx:10Jun2016 Ordered   4  Griselda 0 35 MG Oral Tablet; TAKE 1 TABLET DAILY; Therapy: 61HZW1835 to Recorded   5  Flonase 50 MCG/ACT SUSP (Fluticasone Propionate); USE 1 SPRAY IN EACH NOSTRIL   ONCE DAILY; Therapy: (Recorded:20May2016) to Recorded   6  HydrOXYzine HCl - 25 MG Oral Tablet; TAKE 1 TO 2 TABLETS AT BEDTIME; Last   Rx:09Wuv6301 Ordered   7  Metoprolol Succinate ER 50 MG Oral Tablet Extended Release 24 Hour; TAKE 1 TABLET   DAILY; Therapy: 03WDJ3207 to (Evaluate:16Oct2016)  Requested for: 99BJY7995; Last   Rx:19Apr2016 Ordered   8  Multivitamins TABS; 1 Every Day; Therapy: 97VWD5340 to  Requested for: 55MPA5452 Recorded   9  Pristiq 100 MG Oral Tablet Extended Release 24 Hour; Take 1 tablet daily; Therapy: 47UFQ5648 to  Requested for: 35ZRZ1444 Recorded   10  Vyvanse 50 MG Oral Capsule; 1 capsule at 2:00pm;    Therapy: 52BAW4025 to  Requested for: 93IQC0146 Recorded   11  Vyvanse 60 MG Oral Capsule; take 1 capsule daily;     Therapy: (Recorded:45Emo6040) to Recorded   12  Zaleplon 10 MG Oral Capsule; TAKE 1 TO 2 CAPSULES AT BEDTIME AS NEEDED; Last    Rx:51Jkk0758 Ordered    Family Psych History  Son    1  Family history of attention deficit hyperactivity disorder (ADHD) (V17 0) (Z81 8)  Sister    2  Family history of Bipolar 1 disorder  Family History    3  Family history of Asbestosis   4  Family history of Cancer   5  Family history of Diabetes Mellitus (V18 0)   6  Family history of Emphysema   7  Family history of Glaucoma   8  Family history of Heart Disease (V17 49)   9  Family history of Hypertension (V17 49)   10  Family history of Mixed Hyperlipoproteinemia    Social History    · College graduate   ·    · Employed   · Never A Smoker   · No history of alcohol use    Future Appointments    Date/Time Provider Specialty Site   06/17/2016 10:30 AM GEORGETTE Lan   Psychiatry [facility] PARTIAL HOSPITALIZATION   07/14/2016 11:00 AM Paulette Hankins [place]     Signatures   Electronically signed by : Floyd Oropeza RN; Jun 16 2016  4:44PM EST                       (Author)
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
## 2018-01-17 NOTE — PSYCH
History of Present Illness  [facility] Clinical Progress Note [facility]:   Specialized Services Documentation - Therapist must complete separate progress note for each specific clinical activity in which the client participated during the day  (863) Group Psychotherapy: ([id number]) Client attended group focused on self-sabotage and how it can hinder the group from self-care, and group members discussed their common excuses for avoiding activity, while finally reflecting on some self-care methods that would benefit them  Neena stated that she requires affirmations from others and requests them at all times, and even when hearing positive feedback questions it  Client was prompted to consider if requesting feedback even helps her, as feedback considered not positive elicits a negative response  client was encouraged to think about what it would be like for her to not request feedback on certain actions and practice mindfulness  Minimal progress toward goal  Continue to attend group to offer opportunities to relate to peers appropriately  GEORGETTE Acharya    Treatment Plan Problem(s): 1 2      (788) Group Psychotherapy: 1423-2419 Neena participated in wellness group focused on identifying self-esteem and the relationship of self-esteem to mental illness  Pt shared that after completing the self-esteem assessment, she can relate to several of the questions related to low self-esteem such as being introverted, especially when meeting new people and looking for excuses not to change  Pt made slow progress toward goal  Continue group to educate patient on the impact of low self-esteem on mental health and methods to increase self-esteem for more positive outcomes in recovery  Treatment Plan Problem(s): 1 2  Royce Saavedra RN       (752) Education Therapy Goals set - take kids to the [place]    Treatment Plan Problem(s): 1 1  Education Therapy Time - 0900 - 0930 Previous goal was not met  Readiness to Learning:  She is receptive to learning  Learning Assessment Time - 1330 - 1400   Education completed on  illness and wellness tools  The teaching method was  verbal and written  Shared area of learning: Yes  Lisa HoFAYE     (020) Allied Therapy [id number] Neena actively shared in [facility] group focused on boundary setting  She engaged in improvisation and discussion exploring value of and ways to set healthy limits with self and others  She was attentive during the discussion and was spontaneous as group engaged in task practicing boundary setting with given scenarios  She was able to identify the challenge of setting limits with her children and herself and group explored various ways to do so to support wellness  Group discussed reality of feeling guilty at times, yet the anxiety and chaos left if one chooses not to set limits  Some initial exploration toward treatment goal noted  Continue AT to encourage skill development and use  Treatment Plan Problem(s): 1 1  Chrisadela FAYE Ho       Case Management Note:   [id number] Neena met with CM this afternoon, at her request, to discuss that she has "a very important decision to make " Pt stated that she spoke with her ex-, over the weekend, and discussed her  moving back into the house to take care of the children while patient moved into an apt of her own  She stated that she would have visitation with her children every other weekend (as her ex- now has) and he would take care of the children days and the weekends they were not with her  Pt stated that she also discussed this arrangement with her mother  Neena stated that "I feel I need to do this at this time with my depression but I feel guilty about it " Pt is tearful relating her decision that is already made, but shared that she wanted to "make sure I am not being impulsive " Neena states she feels "overwhelmed" and needs to take care of herself at this time   Neena stated that she was thinking of returning back inpatient and spoke with someone at [facility] over the weekend about possibly returning inpatient but she stated that she was not suicidal and feels she can receive treatment outpatient at [facility]  She stated that she will consult a legal authority regarding custody before she goes ahead and agrees to a new custody arrangement and is not sure how long it will be necessary to have this arrangement  Pt denies SI, HI but did state that she is had several auditory hallucinations such as hearing her sister's voice and talking back to it, she also stated that she heard her dog barking but then realized he was laying next to her  Neena also stated that she saw her mother walking through her home carrying something although her mother lives in [place]  Pt stated that none of these auditory or visual hallucinations were "fearful" or command hallucinations  When asked if pt was taking her medications, Neena stated that she has not reduced her Wellbutrin dose from 300 mg to 150 mg as she was directed to do  She stated that she stayed on the higher dose of Wellbutrin, as she "didn't want to waste the pills " Pt also complained about taking Klonipiin for anxiety on Friday, before leaving Program, and that she felt her driving skills were impaired  Pt states she will not take Klonipin before driving again  Pt requested to meet tomorrow with Dr Kemi Cruz to discuss medication non-compliance with dosages, auditory and visual hallucinations Neena denies she has ever had AH or VH before  Pt reviewed treatment plan with CM, signed and received a copy  TREATMENT SESSION NUMBER: 2   Current suicide risk is low  Medications not changed/added/denied  Asmita Christiansen, RN      Active Problems    1  ADHD, predominantly inattentive type (314 00) (F90 0)   2  Allergic rhinitis (477 9) (J30 9)   3  Benign essential hypertension (401 1) (I10)   4  BMI 31 0-31 9,adult (V85 31) (Z68 31)   5   Cervicalgia (723 1) (M54 2)   6  Constipation (564 00) (K59 00)   7  Dysfunction of eustachian tube, unspecified laterality (381 81) (H69 80)   8  Encounter for screening mammogram for malignant neoplasm of breast (V76 12)   (Z12 31)   9  FORTINO (generalized anxiety disorder) (300 02) (F41 1)   10  Genital herpes simplex (054 10) (A60 00)   11  Headache (784 0) (R51)   12  Hip pain, unspecified laterality   13  Lumbago (724 2) (M54 5)   14  Lyme disease (088 81) (A69 20)   15  Motion sickness (994 6) (T75 3XXA)   16  Obesity, Class I, BMI 30-34 9 (278 00) (E66 9)   17  Other muscle spasm (728 85) (M62 838)   18  Severe episode of recurrent major depressive disorder, without psychotic features    (296 33) (F33 2)   19  Tachycardia (785 0) (R00 0)   20  Thyroid disorder (246 9) (E07 9)   21  Well adult on routine health check (V70 0) (Z00 00)    Past Medical History    1  History of Acute bronchitis (466 0) (J20 9)   2  History of Acute pharyngitis (462) (J02 9)   3  History of Acute sinusitis (461 9) (J01 90)   4  History of Acute sinusitis (461 9) (J01 90)   5  History of Acute tonsillitis (463) (J03 90)   6  History of Acute upper respiratory infection (465 9) (J06 9)   7  History of Acute upper respiratory infection (465 9) (J06 9)   8  History of Acute UTI (599 0) (N39 0)   9  History of Cervicalgia (723 1) (M54 2)   10  History of acute pharyngitis (V12 69) (Z87 09)   11  History of migraine (V12 49) (Z86 69)   12  History of Nonallopathic lesion (739 9) (M99 9)   13  History of Palpitations (785 1) (R00 2)   14  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)   15  History of Shoulder joint pain, unspecified laterality   16  Urinary tract infection (599 0) (N39 0)    Allergies    1  Percocet TABS    Current Meds   1  Albuterol 90 MCG/ACT AERS; INHALE 1 TO 2 PUFFS EVERY 4 TO 6 HOURS AS   NEEDED; Therapy: (Recorded:16Dqa5647) to Recorded   2  ClonazePAM 1 MG Oral Tablet; Take 1 tablet twice daily;    Therapy: 62EYA1801 to  Requested for: 68NAI7483 Recorded   3  Griselda 0 35 MG Oral Tablet; TAKE 1 TABLET DAILY; Therapy: 74JUT2745 to Recorded   4  Flonase 50 MCG/ACT Nasal Suspension; USE 1 SPRAY IN EACH NOSTRIL ONCE   DAILY; Therapy: (Recorded:36Tgo4379) to Recorded   5  HydrOXYzine HCl - 25 MG Oral Tablet; TAKE 1 TO 2 TABLETS AT BEDTIME Recorded   6  Metoprolol Succinate ER 50 MG Oral Tablet Extended Release 24 Hour; TAKE 1 TABLET   DAILY; Therapy: 71ZTY0756 to (Evaluate:16Oct2016)  Requested for: 19Apr2016; Last   Rx:19Apr2016 Ordered   7  Multivitamins TABS; 1 Every Day; Therapy: 38ITI8864 to  Requested for: 38WQI4797 Recorded   8  Pristiq 100 MG Oral Tablet Extended Release 24 Hour; Take 1 tablet daily; Therapy: 98ABK2564 to  Requested for: 50DDF8922 Recorded   9  Vyvanse 50 MG Oral Capsule; 1 capsule at 2:00pm;   Therapy: 75AOP7182 to  Requested for: 35PFR5049 Recorded   10  Vyvanse 60 MG Oral Capsule; take 1 capsule daily; Therapy: (Recorded:91Wtt7225) to Recorded   11  Wellbutrin  MG Oral Tablet Extended Release 24 Hour; Take 1 tablet daily; Therapy: (Recorded:73Jxi4911) to Recorded   12  Wellbutrin  MG Oral Tablet Extended Release 24 Hour; 1 Every Day; Therapy: 15SPN7670 to  Requested for: 42VMI7393 Recorded   13  Zaleplon 10 MG Oral Capsule; TAKE 1 CAPSULE AT BEDTIME; Therapy: (Recorded:10Mln5221) to Recorded    Family Psych History  Son    1  Family history of attention deficit hyperactivity disorder (ADHD) (V17 0) (Z81 8)  Sister    2  Family history of Bipolar 1 disorder  Family History    3  Family history of Asbestosis   4  Family history of Cancer   5  Family history of Diabetes Mellitus (V18 0)   6  Family history of Emphysema   7  Family history of Glaucoma   8  Family history of Heart Disease (V17 49)   9  Family history of Hypertension (V17 49)   10   Family history of Mixed Hyperlipoproteinemia    Social History    · College graduate   ·    · Employed   · Never A Smoker   · No history of alcohol use    Future Appointments    Date/Time Provider Specialty Site   05/24/2016 11:45 AM GEORGETTE Rebollar   Psychiatry [facility]   05/25/2016 10:00 AM Payam Brandon MD Psychiatry [facility]   05/24/2016 03:45 PM Luciano Montejo, [address] [place] [address]   Electronically signed by : Yessica Crenshaw; May 23 2016 10:49AM EST                       (Author)    Electronically signed by : Luz Elena Castillo RN; May 23 2016  2:45PM EST                       (Author)    Electronically signed by : FAYE Jain; May 23 2016  3:02PM EST                       (Author)    Electronically signed by : Luz Elena Castillo RN; May 23 2016  3:07PM EST                       (Author)    Electronically signed by : Luz Elena Castillo RN; May 23 2016  3:22PM EST                       (Author)
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
## 2018-01-17 NOTE — PSYCH
History of Present Illness  [facility] Progress [facility]:   Specialized Services Documentation - Therapist must complete separate progress note for each specific clinical activity in which the client participated during the day  Case Management Note:   0800 Patient does not attend today due to IOP status  Medications not changed/added/denied  Magdalena Shen RN      Active Problems    1  ADHD, predominantly inattentive type (314 00) (F90 0)   2  Allergic rhinitis (477 9) (J30 9)   3  Anxiety and depression (300 00,311) (F41 9,F32 9)   4  Benign essential hypertension (401 1) (I10)   5  BMI 31 0-31 9,adult (V85 31) (Z68 31)   6  Cervicalgia (723 1) (M54 2)   7  Constipation (564 00) (K59 00)   8  Dysfunction of eustachian tube, unspecified laterality (381 81) (H69 80)   9  Encounter for screening mammogram for malignant neoplasm of breast (V76 12)   (Z12 31)   10  FORTINO (generalized anxiety disorder) (300 02) (F41 1)   11  Genital herpes simplex (054 10) (A60 00)   12  Headache (784 0) (R51)   13  Hip pain, unspecified laterality   14  Hypokalemia (276 8) (E87 6)   15  Lumbago (724 2) (M54 5)   16  Lyme disease (088 81) (A69 20)   17  Motion sickness (994 6) (T75 3XXA)   18  Obesity, Class I, BMI 30-34 9 (278 00) (E66 9)   19  Other muscle spasm (728 85) (M62 838)   20  Severe episode of recurrent major depressive disorder, without psychotic features    (296 33) (F33 2)   21  Tachycardia (785 0) (R00 0)   22  Thyroid disorder (246 9) (E07 9)   23  Well adult on routine health check (V70 0) (Z00 00)    Past Medical History    1  History of Acute bronchitis (466 0) (J20 9)   2  History of Acute pharyngitis (462) (J02 9)   3  History of Acute sinusitis (461 9) (J01 90)   4  History of Acute sinusitis (461 9) (J01 90)   5  History of Acute tonsillitis (463) (J03 90)   6  History of Acute upper respiratory infection (465 9) (J06 9)   7   History of Acute upper respiratory infection (024 9) (J06 9)   8  History of Acute UTI (599 0) (N39 0)   9  History of Cervicalgia (723 1) (M54 2)   10  History of acute pharyngitis (V12 69) (Z87 09)   11  History of migraine (V12 49) (Z86 69)   12  History of Nonallopathic lesion (739 9) (M99 9)   13  History of Palpitations (785 1) (R00 2)   14  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)   15  History of Shoulder joint pain, unspecified laterality   16  Urinary tract infection (599 0) (N39 0)    Allergies    1  Percocet TABS    Current Meds   1  Albuterol 90 MCG/ACT AERS; INHALE 1 TO 2 PUFFS EVERY 4 TO 6 HOURS AS   NEEDED; Therapy: (Recorded:80Wue6925) to Recorded   2  BuPROPion HCl ER (XL) 300 MG Oral Tablet Extended Release 24 Hour; TAKE 1   TABLET Daily thity tablets; Therapy: 30GNG6234 to (Evaluate:23Jun2016)  Requested for: 75ENH6417; Last   Rx:24May2016 Ordered   3  ClonazePAM 1 MG Oral Tablet; TAKE 1 TABLET AT BEDTIME; Therapy: 79SOY0876 to (Evaluate:37Bvd2631); Last Rx:10Jun2016 Ordered   4  Griselda 0 35 MG Oral Tablet; TAKE 1 TABLET DAILY; Therapy: 41HIL1707 to Recorded   5  Flonase 50 MCG/ACT SUSP; USE 1 SPRAY IN EACH NOSTRIL ONCE DAILY; Therapy: (Recorded:09Pox6777) to Recorded   6  HydrOXYzine HCl - 25 MG Oral Tablet; TAKE 1 TO 2 TABLETS AT BEDTIME; Last   Rx:80Dof9012 Ordered   7  Metoprolol Succinate ER 50 MG Oral Tablet Extended Release 24 Hour; TAKE 1 TABLET   DAILY; Therapy: 07DWA7793 to (Evaluate:16Oct2016)  Requested for: 03QCS5563; Last   Rx:19Apr2016 Ordered   8  Multivitamins TABS; 1 Every Day; Therapy: 32HYB7356 to  Requested for: 96ETQ1260 Recorded   9  Pristiq 100 MG Oral Tablet Extended Release 24 Hour; Take 1 tablet daily; Therapy: 01ACY4494 to  Requested for: 38OPK1684 Recorded   10  Vyvanse 50 MG Oral Capsule; 1 capsule at 2:00pm;    Therapy: 43EQV7637 to  Requested for: 63IPJ1198 Recorded   11  Vyvanse 60 MG Oral Capsule; take 1 capsule daily;     Therapy: (Recorded:44Grg0079) to Recorded   12  Zaleplon 10 MG Oral Capsule; TAKE 1 TO 2 CAPSULES AT BEDTIME AS NEEDED; Last    KB:09BBO0598 Ordered    Family Psych History  Son    1  Family history of attention deficit hyperactivity disorder (ADHD) (V17 0) (Z81 8)  Sister    2  Family history of Bipolar 1 disorder  Family History    3  Family history of Asbestosis   4  Family history of Cancer   5  Family history of Diabetes Mellitus (V18 0)   6  Family history of Emphysema   7  Family history of Glaucoma   8  Family history of Heart Disease (V17 49)   9  Family history of Hypertension (V17 49)   10  Family history of Mixed Hyperlipoproteinemia    Social History    · College graduate   ·    · Employed   · Never A Smoker   · No history of alcohol use    Future Appointments    Date/Time Provider Specialty Site   06/22/2016 10:15 AM GEORGETTE Rebollar  Psychiatry [facility] PARTIAL HOSPITALIZATION   06/24/2016 10:15 AM GEORGETTE Rebollar   Psychiatry [facility] PART[facility] HOSPITALIZATION   07/14/2016 11:00 AM Paulette Hankins [facility]     Signatures   Electronically signed by : Luz Elena Castillo RN; Jun 20 2016  3:04PM EST                       (Author)

## 2018-01-17 NOTE — PSYCH
Message  Message Free Text Note Form: Patient was discharged on 6/24/2016 but did not come in to Program 6/24/2016   spoke with patient who stated she would come in to complete discharge on 6/27/2016  Patient no call no show on 6/27/2016 but did come in on 6/28/2016 to complete and review discharge  D/C Instructions reviewed, Relapse Prevention Plan reviewed, Outpatient resources reviewed, Copies were also made of medication list and all copies of documents were mailed to patient this afternoon as per her request  JUANITA Quevedo RN      Active Problems    1  ADHD, predominantly inattentive type (314 00) (F90 0)   2  Allergic rhinitis (477 9) (J30 9)   3  Anxiety and depression (300 00,311) (F41 9,F32 9)   4  Benign essential hypertension (401 1) (I10)   5  BMI 31 0-31 9,adult (V85 31) (Z68 31)   6  Cervicalgia (723 1) (M54 2)   7  Constipation (564 00) (K59 00)   8  Dysfunction of eustachian tube, unspecified laterality (381 81) (H69 80)   9  Encounter for screening mammogram for malignant neoplasm of breast (V76 12)   (Z12 31)   10  FORTINO (generalized anxiety disorder) (300 02) (F41 1)   11  Genital herpes simplex (054 10) (A60 00)   12  Headache (784 0) (R51)   13  Hip pain, unspecified laterality   14  Hypokalemia (276 8) (E87 6)   15  Lumbago (724 2) (M54 5)   16  Lyme disease (088 81) (A69 20)   17  Motion sickness (994 6) (T75 3XXA)   18  Obesity, Class I, BMI 30-34 9 (278 00) (E66 9)   19  Other muscle spasm (728 85) (M62 838)   20  Severe episode of recurrent major depressive disorder, without psychotic features    (296 33) (F33 2)   21  Tachycardia (785 0) (R00 0)   22  Thyroid disorder (246 9) (E07 9)   23  Well adult on routine health check (V70 0) (Z00 00)    Current Meds   1  Albuterol 90 MCG/ACT AERS; INHALE 1 TO 2 PUFFS EVERY 4 TO 6 HOURS AS   NEEDED; Therapy: (Recorded:24Bzt9837) to Recorded   2   BuPROPion HCl ER (XL) 300 MG Oral Tablet Extended Release 24 Hour (Wellbutrin XL);   TAKE 1 TABLET Daily thity tablets; Therapy: 91FAO8377 to (Evaluate:23Jun2016)  Requested for: 65NQM8189; Last   Rx:24May2016 Ordered   3  ClonazePAM 1 MG Oral Tablet; TAKE 1 TABLET AT BEDTIME; Therapy: 57PKB9171 to (Evaluate:13Yca9572); Last Rx:10Jun2016 Ordered   4  Griselda 0 35 MG Oral Tablet; TAKE 1 TABLET DAILY; Therapy: 50UNT5754 to Recorded   5  Flonase 50 MCG/ACT SUSP (Fluticasone Propionate); USE 1 SPRAY IN EACH NOSTRIL   ONCE DAILY; Therapy: (Recorded:20May2016) to Recorded   6  HydrOXYzine HCl - 25 MG Oral Tablet; TAKE 1 TO 2 TABLETS AT BEDTIME; Last   Rx:24May2016 Ordered   7  Metoprolol Succinate ER 50 MG Oral Tablet Extended Release 24 Hour; TAKE 1 TABLET   DAILY; Therapy: 77ZCK9686 to (Evaluate:16Oct2016)  Requested for: 05XYB2532; Last   Rx:19Apr2016 Ordered   8  Multivitamins TABS; 1 Every Day; Therapy: 96ZWT5408 to  Requested for: 90QST1444 Recorded   9  Pristiq 100 MG Oral Tablet Extended Release 24 Hour; Take 1 tablet daily; Therapy: 37UTR4638 to  Requested for: 97UMX4441 Recorded   10  Vyvanse 50 MG Oral Capsule; 1 capsule at 2:00pm;    Therapy: 85LWN6543 to  Requested for: 51REP5988 Recorded   11  Vyvanse 60 MG Oral Capsule; take 1 capsule daily; Therapy: (Recorded:81Yja1597) to Recorded   12  Zaleplon 10 MG Oral Capsule; TAKE 1 TO 2 CAPSULES AT BEDTIME AS NEEDED; Last    Rx:24May2016 Ordered    Allergies    1   Percocet TABS    Signatures   Electronically signed by : Silviano Lama RN; Jun 28 2016  4:27PM EST                       (Author)

## 2018-01-17 NOTE — RESULT NOTES
Discussion/Summary   benign lesion, probably skin reaction     Verified Results  (Q) TISSUE PATHOLOGY [id number] 12:00AM Simonne Side     Test Name Result Flag Reference   CLINICAL INFORMATION      Neoplasm   PATHOLOGIST      Crissie Lesch Arvis Ruth, M D  [facility] in Dermatology and Dermatopathology   [phone]  (Electronic Signature)     pb (09/29/17)     TISSUE, SPECIMEN A [id number] 12:00AM [facility] Side     Test Name Result Flag Reference   A SOURCE      RIGHT DISTAL ARM   A PROCEDURE      Biopsy/ies   A GROSS DESCRIPTION      Received in formalin labeled with the patient's   name and right distal arm is a punch biopsy of   skin measuring 2 x 2 x 3 mm  The specimen is   entirely submitted in one cassette  Gross exam(s) performed at:    [facility]    [address] [address] [facility] [address]    : Kip ALVAREZ MD   A MICRO DESCRIPTION      Extravasated erythrocytes are present in the  upper part of the dermis  The features seen are mild spongiosis in the   epidermis, edema in the papillary dermis, and a   superficial, mostly perivascular infiltrate of   lymphocytes, histiocytes, and occasional   eosinophils  The features are interpreted as   those of angiitis, consistent with insect bite   reaction  Papular urticaria, drug eruption, and   other types of hypersensitivity reactions would   be included in the differential diagnosis  A DIAGNOSIS      PURPURA & DERMAL HYPERSENSITIVITY REACTION,   PROBABLE INSECT BITE REACTION   A COMMENT      Deeper sections were also examined

## 2018-01-18 NOTE — PSYCH
History of Present Illness  [facility] Progress [facility]:   Specialized Services Documentation - Therapist must complete separate progress note for each specific clinical activity in which the client participated during the day  Case Management Note:   1000 CM called Neena when she did not arrive to Program and pt stated that she had tried to call [facility] but telephones were out of order  She stated she did not feel well (GI upset today) but would return to Program on Monday  Current suicide risk is low  Medications not changed/added/denied  Betsy Kumar RN      Active Problems    1  ADHD, predominantly inattentive type (314 00) (F90 0)   2  Allergic rhinitis (477 9) (J30 9)   3  Anxiety and depression (300 00,311) (F41 9,F32 9)   4  Benign essential hypertension (401 1) (I10)   5  BMI 31 0-31 9,adult (V85 31) (Z68 31)   6  Cervicalgia (723 1) (M54 2)   7  Constipation (564 00) (K59 00)   8  Dysfunction of eustachian tube, unspecified laterality (381 81) (H69 80)   9  Encounter for screening mammogram for malignant neoplasm of breast (V76 12)   (Z12 31)   10  FORTINO (generalized anxiety disorder) (300 02) (F41 1)   11  Genital herpes simplex (054 10) (A60 00)   12  Headache (784 0) (R51)   13  Hip pain, unspecified laterality   14  Hypokalemia (276 8) (E87 6)   15  Lumbago (724 2) (M54 5)   16  Lyme disease (088 81) (A69 20)   17  Motion sickness (994 6) (T75 3XXA)   18  Obesity, Class I, BMI 30-34 9 (278 00) (E66 9)   19  Other muscle spasm (728 85) (M62 838)   20  Severe episode of recurrent major depressive disorder, without psychotic features    (296 33) (F33 2)   21  Tachycardia (785 0) (R00 0)   22  Thyroid disorder (246 9) (E07 9)   23  Well adult on routine health check (V70 0) (Z00 00)    Past Medical History    1  History of Acute bronchitis (466 0) (J20 9)   2  History of Acute pharyngitis (462) (J02 9)   3  History of Acute sinusitis (461 9) (J01 90)   4   History of Acute sinusitis (461 9) (J01 90)   5  History of Acute tonsillitis (463) (J03 90)   6  History of Acute upper respiratory infection (465 9) (J06 9)   7  History of Acute upper respiratory infection (465 9) (J06 9)   8  History of Acute UTI (599 0) (N39 0)   9  History of Cervicalgia (723 1) (M54 2)   10  History of acute pharyngitis (V12 69) (Z87 09)   11  History of migraine (V12 49) (Z86 69)   12  History of Nonallopathic lesion (739 9) (M99 9)   13  History of Palpitations (785 1) (R00 2)   14  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)   15  History of Shoulder joint pain, unspecified laterality   16  Urinary tract infection (599 0) (N39 0)    Allergies    1  Percocet TABS    Current Meds   1  Albuterol 90 MCG/ACT AERS; INHALE 1 TO 2 PUFFS EVERY 4 TO 6 HOURS AS   NEEDED; Therapy: (Recorded:20May2016) to Recorded   2  BuPROPion HCl ER (XL) 300 MG Oral Tablet Extended Release 24 Hour; TAKE 1   TABLET Daily thity tablets; Therapy: 82MLU0262 to (Evaluate:23Jun2016)  Requested for: 43COL3666; Last   Rx:24May2016 Ordered   3  ClonazePAM 1 MG Oral Tablet; TAKE 1 TABLET AT BEDTIME; Therapy: 20FQE5121 to (Evaluate:91Hlx5765); Last Rx:10Jun2016 Ordered   4  Griselda 0 35 MG Oral Tablet; TAKE 1 TABLET DAILY; Therapy: 41KQU8708 to Recorded   5  Flonase 50 MCG/ACT SUSP; USE 1 SPRAY IN EACH NOSTRIL ONCE DAILY; Therapy: (Recorded:07Xru2062) to Recorded   6  HydrOXYzine HCl - 25 MG Oral Tablet; TAKE 1 TO 2 TABLETS AT BEDTIME; Last   Rx:38Mrj9148 Ordered   7  Metoprolol Succinate ER 50 MG Oral Tablet Extended Release 24 Hour; TAKE 1 TABLET   DAILY; Therapy: 44MJM5031 to (Evaluate:16Oct2016)  Requested for: 24WBQ5016; Last   Rx:19Apr2016 Ordered   8  Multivitamins TABS; 1 Every Day; Therapy: 02ZJU0305 to  Requested for: 25ZUE7484 Recorded   9  Pristiq 100 MG Oral Tablet Extended Release 24 Hour; Take 1 tablet daily; Therapy: 86QGN9005 to  Requested for: 76YIR1917 Recorded   10   Vyvanse 50 MG Oral Capsule; 1 capsule at 2:00pm;    Therapy: 12STR3137 to  Requested for: 16IJU1599 Recorded   11  Vyvanse 60 MG Oral Capsule; take 1 capsule daily; Therapy: (Recorded:20Iro8118) to Recorded   12  Zaleplon 10 MG Oral Capsule; TAKE 1 TO 2 CAPSULES AT BEDTIME AS NEEDED; Last    Rx:84Zqs8185 Ordered    Family Psych History  Son    1  Family history of attention deficit hyperactivity disorder (ADHD) (V17 0) (Z81 8)  Sister    2  Family history of Bipolar 1 disorder  Family History    3  Family history of Asbestosis   4  Family history of Cancer   5  Family history of Diabetes Mellitus (V18 0)   6  Family history of Emphysema   7  Family history of Glaucoma   8  Family history of Heart Disease (V17 49)   9  Family history of Hypertension (V17 49)   10  Family history of Mixed Hyperlipoproteinemia    Social History    · College graduate   ·    · Employed   · Never A Smoker   · No history of alcohol use    Future Appointments    Date/Time Provider Specialty Site   06/20/2016 10:15 AM GEORGETTE Wilde   Psychiatry [facility] PARTIAL HOSPITALIZATION   07/14/2016 11:00 AM Paulette Hankins [facility]     Signatures   Electronically signed by : Ginny Marrufo RN; Jun 17 2016  3:13PM EST                       (Author)

## 2018-01-18 NOTE — PSYCH
History of Present Illness  [facility] Clinical Progress Note [facility]:   Specialized Services Documentation - Therapist must complete separate progress note for each specific clinical activity in which the client participated during the day  (996) Group Psychotherapy: [id number]  Ct participated in psychotherapy group  Each group member stated how they were doing  Shared, "She is anxious today and doesn't know why " She shared a great deal on a major change she made in her life to get her own appt and "give up" primary custody of her children  Last night was night 3 of sleeping in her appt and of living on her on ever  Mod progress on goal  Continue with group  Treatment Plan Problem(s): 1 1  Amber Hardy LCSW     (487) Group Psychotherapy: (1045-11:45) Anthony Conway participated in group weekly wellness assessment followed by reading spiritual card, She chose to share social wellbeing and expressed her helplessness over not having a single friend to share, still she has to rely on her ex, which is not helpful all the time  Peers offered suggestions to attend local support groups and [facility], she remained receptive  She offers spontaneous feedback and support to peers  Moderate progress toward goals noted  Janny Ray RN  Treatment Plan Problem(s): 1 1,1 2        (277) Education Therapy Goals set - work on developing a written schedule     Treatment Plan Problem(s): 1 5  Education Therapy Time - 0900 - 0930 Previous goal was met  Readiness to Learning:  She is receptive to learning  There are  no barriers to learning  Learning Assessment Time - 1330 - 1400   Education completed on  illness and wellness tools  The teaching method was  verbal and written  Shared area of learning: Yes  FAYE Valles     (249) Allied Therapy 9805-1846 Neena actively shared in [facility] group focused on managing unstructured time  She engaged in task sharing healthy options for upcoming weekend   She identified resources for support and easily shared with peers  Group reinforced by professional and personal resources for time outside of program  She shared how she uses a routine in her classroom and now sees how she must implement this for herself to prevent self from continuing to be so scattered and anxious  Tools/handouts provided to assist in creating this  Some positive effort noted toward tx goal  Continue AT to reinforce healthy transfer of skills outside of program to support self and wellness  Treatment Plan Problem(s): 1 5  Fidel Jolly MT-BC       Case Management Note:   (10:30-10:45) CM met with Neena to review progress and weekend plan  She stated feels anxious most of the time and not able to manage time properly as supposed to go to attend support group on Depression yesterday but missed it due to poor time management  Writer encouraged her to make daily schedule and stick with the plan  She has no specific plans for weekend but stated going to find something to socialize  Tried to encourage make one plan but not able to list at this time  She is aware of her IOP days are Monday,Wed and Friday now  Bennie Jordan RN   TREATMENT SESSION NUMBER: 12   Current suicide risk is low  Medications not changed/added/denied  Active Problems    1  ADHD, predominantly inattentive type (314 00) (F90 0)   2  Allergic rhinitis (477 9) (J30 9)   3  Anxiety and depression (300 00,311) (F41 9,F32 9)   4  Benign essential hypertension (401 1) (I10)   5  BMI 31 0-31 9,adult (V85 31) (Z68 31)   6  Cervicalgia (723 1) (M54 2)   7  Constipation (564 00) (K59 00)   8  Dysfunction of eustachian tube, unspecified laterality (381 81) (H69 80)   9  Encounter for screening mammogram for malignant neoplasm of breast (V76 12)   (Z12 31)   10  FORTINO (generalized anxiety disorder) (300 02) (F41 1)   11  Genital herpes simplex (054 10) (A60 00)   12  Headache (784 0) (R51)   13  Hip pain, unspecified laterality   14   Hypokalemia (276  8) (E87 6)   15  Lumbago (724 2) (M54 5)   16  Lyme disease (088 81) (A69 20)   17  Motion sickness (994 6) (T75 3XXA)   18  Obesity, Class I, BMI 30-34 9 (278 00) (E66 9)   19  Other muscle spasm (728 85) (M62 838)   20  Severe episode of recurrent major depressive disorder, without psychotic features    (296 33) (F33 2)   21  Tachycardia (785 0) (R00 0)   22  Thyroid disorder (246 9) (E07 9)   23  Well adult on routine health check (V70 0) (Z00 00)    Past Medical History    1  History of Acute bronchitis (466 0) (J20 9)   2  History of Acute pharyngitis (462) (J02 9)   3  History of Acute sinusitis (461 9) (J01 90)   4  History of Acute sinusitis (461 9) (J01 90)   5  History of Acute tonsillitis (463) (J03 90)   6  History of Acute upper respiratory infection (465 9) (J06 9)   7  History of Acute upper respiratory infection (465 9) (J06 9)   8  History of Acute UTI (599 0) (N39 0)   9  History of Cervicalgia (723 1) (M54 2)   10  History of acute pharyngitis (V12 69) (Z87 09)   11  History of migraine (V12 49) (Z86 69)   12  History of Nonallopathic lesion (739 9) (M99 9)   13  History of Palpitations (785 1) (R00 2)   14  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)   15  History of Shoulder joint pain, unspecified laterality   16  Urinary tract infection (599 0) (N39 0)    Allergies    1  Percocet TABS    Current Meds   1  Albuterol 90 MCG/ACT AERS; INHALE 1 TO 2 PUFFS EVERY 4 TO 6 HOURS AS   NEEDED; Therapy: (Recorded:10Jcf5110) to Recorded   2  BuPROPion HCl ER (XL) 300 MG Oral Tablet Extended Release 24 Hour; TAKE 1   TABLET Daily thity tablets; Therapy: 71XTK3320 to (Evaluate:23Jun2016)  Requested for: 85QRT9655; Last   Rx:36Ibf9500 Ordered   3  Griselda 0 35 MG Oral Tablet; TAKE 1 TABLET DAILY; Therapy: 65UTE7535 to Recorded   4  Flonase 50 MCG/ACT SUSP; USE 1 SPRAY IN EACH NOSTRIL ONCE DAILY; Therapy: (Recorded:84Jzl0872) to Recorded   5   HydrOXYzine HCl - 25 MG Oral Tablet; TAKE 1 TO 2 TABLETS AT BEDTIME; Last   Rx:89Hwb6453 Ordered   6  Metoprolol Succinate ER 50 MG Oral Tablet Extended Release 24 Hour; TAKE 1 TABLET   DAILY; Therapy: 65MOJ8299 to (Evaluate:16Oct2016)  Requested for: 50RXA0109; Last   Rx:17Pkg6087 Ordered   7  Multivitamins TABS; 1 Every Day; Therapy: 17HDP8108 to  Requested for: 75FKL7673 Recorded   8  Pristiq 100 MG Oral Tablet Extended Release 24 Hour; Take 1 tablet daily; Therapy: 28YNY2839 to  Requested for: 20YLV2424 Recorded   9  Vyvanse 50 MG Oral Capsule; 1 capsule at 2:00pm;   Therapy: 74NTR8303 to  Requested for: 29HBY3772 Recorded   10  Vyvanse 60 MG Oral Capsule; take 1 capsule daily; Therapy: (Recorded:96Qip7871) to Recorded   11  Zaleplon 10 MG Oral Capsule; TAKE 1 TO 2 CAPSULES AT BEDTIME AS NEEDED; Last    HB:42NPQ0016 Ordered    Family Psych History  Son    1  Family history of attention deficit hyperactivity disorder (ADHD) (V17 0) (Z81 8)  Sister    2  Family history of Bipolar 1 disorder  Family History    3  Family history of Asbestosis   4  Family history of Cancer   5  Family history of Diabetes Mellitus (V18 0)   6  Family history of Emphysema   7  Family history of Glaucoma   8  Family history of Heart Disease (V17 49)   9  Family history of Hypertension (V17 49)   10  Family history of Mixed Hyperlipoproteinemia    Social History    · College graduate   ·    · Employed   · Never A Smoker   · No history of alcohol use    Future Appointments    Date/Time Provider Specialty Site   06/13/2016 10:30 AM [facility]GETTE Ferguson  [facility]   06/15/2016 10:15 AM GE[facility]GETTE Ferguson  [facility]   06/17/2016 10:30 AM GEORGETTE Ferguson   Psychiatry [facility] PARTIAL HOSPITALIZATION   07/14/2016 11:00 AM Paulette Hankins [facility]     Signatures   Electronically signed by : Cate Albrecht LCSW; Larry 10 2016  2:21PM EST                       (Author) Electronically signed by : Author FAYE Monterroso; Larry 10 2016  3:00PM EST                       (Author)    Electronically signed by : Paloma Sauer RN; Larry 10 2016  4:26PM EST                       (Author)    Electronically signed by : Paloma Sauer RN; Larry 10 2016  4:33PM EST                       (Author)

## 2018-01-18 NOTE — PSYCH
History of Present Illness  [facility] Clinical Progress Note [facility]:   Specialized Services Documentation - Therapist must complete separate progress note for each specific clinical activity in which the client participated during the day  (178) Group Psychotherapy: [id number] [address] participated in wellness group focused on assessment of weekly goal work in each area of functioning; including physical, emotional, cognitive, social and spiritual  [address] shared that she feels "stuck" in the vocational area of wellness as she feels she no longer can "give what I need to give to my students because I am not strong enough " 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" and she feels 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 do this but that, due to financial needs and having done this for so long, she has no alternative but to keep doing this job  Peers encouraged Neena to address negative thinking about "being stuck" and to open her mind to problem solving that may inspire her to create a more satisfying career-life for herself  [address] made moderate progress toward goal  Continue group to educate and encourage patient to identify areas of functioning requiring ongoing attention for healthier functioning  Treatment Plan Problem(s): 1 1,1 2  Lobo Gamble RN     (825) Group Psychotherapy: (10:45-11:45) Neena participated in group psychotherapy focused on family roles and communication in relationships  Neena identified trying to limit contact with her ex as a stressor, and stated that she is trying to do it herself for her own benefit  She did not discuss further personal issues, but did participate in group discussion and provided peers with some support and feedback about trying not to take on responsibility for other people's feelings  Some moderate progress toward goals today  Continue psychotherapy group to allow Neena to explore stressors and healthy ways of coping  Treatment Plan Problem(s): 1 1, 1 2   [place]  [place]vie [place], [place]       (288) Education Therapy Goals set - not isolate all weekend    Treatment Plan Problem(s): 1 1, 1 4  Education Therapy Time - 0900 - 0930 Previous goal was not met  Readiness to Learning:  She is receptive to learning  There are  no barriers to learning  Learning Assessment Time - 1330 - 1400   Education completed on  illness and wellness tools  The teaching method was  verbal  Shared area of learning: Yes  FAYE Diaz     (828) Allied Therapy [id number] Neena actively shared in [facility] group focused on managing unstructured time  She engaged in task sharing healthy options for upcoming weekend  She identified resources for support and easily shared with peers  She identified learning the importance of sticking to a routine and the need to be mindful of not isolating this weekend in addition to [facility] exercises like eating  Group reinforced professional and personal resources for time outside of program with special note to preparing for a long, holiday weekend  Some beginning effort noted toward tx goal  Continue AT to reinforce healthy transfer of skills outside of program to support self and wellness  Treatment Plan Problem(s): 1 1, 1 4  FAYE Diaz       Case Management Note:   [id number] Met with Neena  She shared not taking daughter shopping as daughter changed the plans  Her limits was appropriate  She also saw on social media some inappropriate postings that daughter shared and spoke with daughter about this  Neena was very reasonable, calm and appropriate while discussing  Shared impression related to her care and concern of her daughter being opposite of her inconsistent, lack of present parent she shared she feared being earlier this week  Reviewed opportunities for weekend and supports  She was encouraged to being to list answers to 1 1 and work on an activity log   She did report despite disappointment with daughter last night, she did force herself to eat dinner  Some effort toward wellness noted  UR due Tuesday 9/6/16  TREATMENT SESSION NUMBER: 3   Current suicide risk is low  Medications not changed/added/denied  [employer], [facility]      Active Problems    1  ADHD, predominantly inattentive type (314 00) (F90 0)   2  Allergic rhinitis (477 9) (J30 9)   3  Anxiety and depression (300 00,311) (F41 9,F32 9)   4  Benign essential hypertension (401 1) (I10)   5  BMI 31 0-31 9,adult (V85 31) (Z68 31)   6  Cervicalgia (723 1) (M54 2)   7  Constipation (564 00) (K59 00)   8  Dysfunction of eustachian tube, unspecified laterality (381 81) (H69 80)   9  Encounter for screening mammogram for malignant neoplasm of breast (V76 12)   (Z12 31)   10  FORTINO (generalized anxiety disorder) (300 02) (F41 1)   11  Genital herpes simplex (054 10) (A60 00)   12  Headache (784 0) (R51)   13  Hip pain, unspecified laterality   14  Hypokalemia (276 8) (E87 6)   15  Lumbago (724 2) (M54 5)   16  Lyme disease (088 81) (A69 20)   17  Motion sickness (994 6) (T75 3XXA)   18  Obesity, Class I, BMI 30-34 9 (278 00) (E66 9)   19  Other muscle spasm (728 85) (M62 838)   20  Severe episode of recurrent major depressive disorder, without psychotic features    (296 33) (F33 2)   21  Tachycardia (785 0) (R00 0)   22  Thyroid disorder (246 9) (E07 9)   23  Well adult on routine health check (V70 0) (Z00 00)    Past Medical History    1  History of Acute bronchitis (466 0) (J20 9)   2  History of Acute pharyngitis (462) (J02 9)   3  History of Acute sinusitis (461 9) (J01 90)   4  History of Acute sinusitis (461 9) (J01 90)   5  History of Acute tonsillitis (463) (J03 90)   6  History of Acute upper respiratory infection (465 9) (J06 9)   7  History of Acute upper respiratory infection (465 9) (J06 9)   8  History of Acute UTI (599 0) (N39 0)   9  History of Cervicalgia (723 1) (M54 2)   10  Denied: History of Head trauma   11  History of acute pharyngitis (V12 69) (Z87 09)   12   History of migraine (V12 49) (Z86 69)   13  History of Nonallopathic lesion (739 9) (M99 9)   14  History of Palpitations (785 1) (R00 2)   15  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)   16  Denied: History of Seizure   17  History of Shoulder joint pain, unspecified laterality   18  Urinary tract infection (599 0) (N39 0)    Allergies    1  Percocet TABS    Current Meds   1  Albuterol 90 MCG/ACT AERS; INHALE 1 TO 2 PUFFS EVERY 4 TO 6 HOURS AS   NEEDED; Therapy: (Recorded:53Cjl0959) to Recorded   2  BuPROPion HCl ER (XL) 300 MG Oral Tablet Extended Release 24 Hour; TAKE 1   TABLET Daily thity tablets; Therapy: 56XPJ8077 to (Evaluate:23Jun2016)  Requested for: 06ZTG7227; Last   Rx:24May2016 Ordered   3  CloNIDine HCl - 0 1 MG Oral Tablet; Take one tablet PO  AT 6:00 PM and 1 tablet PO AT   9:00 pm;   Therapy: (Recorded:95Mhn7590) to Recorded   4  Griselda 0 35 MG Oral Tablet; TAKE 1 TABLET DAILY; Therapy: 85NGD7877 to Recorded   5  Flonase 50 MCG/ACT SUSP; USE 1 SPRAY IN EACH NOSTRIL ONCE DAILY; Therapy: (Recorded:20May2016) to Recorded   6  Gabapentin 300 MG Oral Capsule; TAKE 3 CAPSULE Bedtime; Therapy: (Recorded:28Dzz7331) to Recorded   7  HydrOXYzine HCl - 25 MG Oral Tablet; TAKE 1 TO 2 TABLETS AT BEDTIME; Last   Rx:24May2016 Ordered   8  Metoprolol Succinate ER 50 MG Oral Tablet Extended Release 24 Hour; TAKE 1 TABLET   DAILY; Therapy: 34TST4062 to (Evaluate:16Oct2016)  Requested for: 06ZSL0188; Last   Rx:19Apr2016 Ordered   9  Multivitamins TABS; 1 Every Day; Therapy: 01KNT4313 to  Requested for: 66WFW3831 Recorded   10  Pristiq 100 MG Oral Tablet Extended Release 24 Hour; Take 1 tablet daily; Therapy: 83LBN5144 to  Requested for: 73RFN1688 Recorded   11  Vyvanse 50 MG Oral Capsule; 1 capsule at 2:00pm;    Therapy: 25UIX7410 to  Requested for: 41DQU4034 Recorded   12  Vyvanse 60 MG Oral Capsule; take 1 capsule daily; Therapy: (Recorded:21Xax6851) to Recorded   13   Zaleplon 10 MG Oral Capsule; TAKE 1 TO 2 CAPSULES AT BEDTIME AS NEEDED; Last    KR:32BJO1066 Ordered    Family Psych History  Son    1  Family history of attention deficit hyperactivity disorder (ADHD) (V17 0) (Z81 8)  Sister    2  Family history of Bipolar 1 disorder  Family History    3  Family history of Asbestosis   4  Family history of Cancer   5  Family history of Diabetes Mellitus (V18 0)   6  Family history of Emphysema   7  Family history of Glaucoma   8  Family history of Heart Disease (V17 49)   9  Family history of Hypertension (V17 49)   10   Family history of Mixed Hyperlipoproteinemia    Social History    · College graduate   ·    · Employed   · Never A Smoker   · No history of alcohol use    Future Appointments    Date/Time Provider Specialty Site   [facility]     Signatures   Electronically signed by : JONATHAN Dorman; Sep  2 2016 12:58PM EST                       (Author)    Electronically signed by : Aspen Vásquez RN; Sep  2 2016  1:46PM EST                       (Author)    Electronically signed by : FAYE Ceron; Sep  2 2016  2:34PM EST                       (Author)
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
## 2018-01-18 NOTE — PSYCH
History of Present Illness  [facility] Clinical Progress Note [facility]:   Specialized Services Documentation - Therapist must complete separate progress note for each specific clinical activity in which the client participated during the day  Case Management Note:   0800 Alejandro Hogan is excused from Program today due to IOP status  Referral was made to [address] [place] [address] "Mindfulness group" per patient's request yesterday  Alejandro Hogan will be contacted with group start date after her first OP therapist appointment with Paulette flaherty, 7/14/2016  Current suicide risk is low  Medications not changed/added/denied  Anaya Tejeda RN      Active Problems    1  ADHD, predominantly inattentive type (314 00) (F90 0)   2  Allergic rhinitis (477 9) (J30 9)   3  Anxiety and depression (300 00,311) (F41 9,F32 9)   4  Benign essential hypertension (401 1) (I10)   5  BMI 31 0-31 9,adult (V85 31) (Z68 31)   6  Cervicalgia (723 1) (M54 2)   7  Constipation (564 00) (K59 00)   8  Dysfunction of eustachian tube, unspecified laterality (381 81) (H69 80)   9  Encounter for screening mammogram for malignant neoplasm of breast (V76 12)   (Z12 31)   10  FORTINO (generalized anxiety disorder) (300 02) (F41 1)   11  Genital herpes simplex (054 10) (A60 00)   12  Headache (784 0) (R51)   13  Hip pain, unspecified laterality   14  Hypokalemia (276 8) (E87 6)   15  Lumbago (724 2) (M54 5)   16  Lyme disease (088 81) (A69 20)   17  Motion sickness (994 6) (T75 3XXA)   18  Obesity, Class I, BMI 30-34 9 (278 00) (E66 9)   19  Other muscle spasm (728 85) (M62 838)   20  Severe episode of recurrent major depressive disorder, without psychotic features    (296 33) (F33 2)   21  Tachycardia (785 0) (R00 0)   22  Thyroid disorder (246 9) (E07 9)   23  Well adult on routine health check (V70 0) (Z00 00)    Past Medical History    1  History of Acute bronchitis (466 0) (J20 9)   2  History of Acute pharyngitis (462) (J02 9)   3   History of Acute sinusitis (461 9) (J01 90)   4  History of Acute sinusitis (461 9) (J01 90)   5  History of Acute tonsillitis (463) (J03 90)   6  History of Acute upper respiratory infection (465 9) (J06 9)   7  History of Acute upper respiratory infection (465 9) (J06 9)   8  History of Acute UTI (599 0) (N39 0)   9  History of Cervicalgia (723 1) (M54 2)   10  History of acute pharyngitis (V12 69) (Z87 09)   11  History of migraine (V12 49) (Z86 69)   12  History of Nonallopathic lesion (739 9) (M99 9)   13  History of Palpitations (785 1) (R00 2)   14  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)   15  History of Shoulder joint pain, unspecified laterality   16  Urinary tract infection (599 0) (N39 0)    Allergies    1  Percocet TABS    Current Meds   1  Albuterol 90 MCG/ACT AERS; INHALE 1 TO 2 PUFFS EVERY 4 TO 6 HOURS AS   NEEDED; Therapy: (Recorded:20May2016) to Recorded   2  BuPROPion HCl ER (XL) 300 MG Oral Tablet Extended Release 24 Hour; TAKE 1   TABLET Daily thity tablets; Therapy: 34SVE5507 to (Evaluate:23Jun2016)  Requested for: 36BNS8636; Last   Rx:24May2016 Ordered   3  ClonazePAM 1 MG Oral Tablet; TAKE 1 TABLET AT BEDTIME; Therapy: 69NOM1334 to (Evaluate:16Lsw8322); Last Rx:10Jun2016 Ordered   4  Griselda 0 35 MG Oral Tablet; TAKE 1 TABLET DAILY; Therapy: 44OKP4379 to Recorded   5  Flonase 50 MCG/ACT SUSP; USE 1 SPRAY IN EACH NOSTRIL ONCE DAILY; Therapy: (Recorded:03Rvx8569) to Recorded   6  HydrOXYzine HCl - 25 MG Oral Tablet; TAKE 1 TO 2 TABLETS AT BEDTIME; Last   Rx:24May2016 Ordered   7  Metoprolol Succinate ER 50 MG Oral Tablet Extended Release 24 Hour; TAKE 1 TABLET   DAILY; Therapy: 34ZIC9427 to (Evaluate:16Oct2016)  Requested for: 76HHM9594; Last   Rx:19Apr2016 Ordered   8  Multivitamins TABS; 1 Every Day; Therapy: 36EFI5307 to  Requested for: 51PAM6803 Recorded   9  Pristiq 100 MG Oral Tablet Extended Release 24 Hour; Take 1 tablet daily;    Therapy: 93NIG3923 to  Requested for: 24SPB5402 Recorded 10  Vyvanse 50 MG Oral Capsule; 1 capsule at 2:00pm;    Therapy: 13RMI8754 to  Requested for: 79GXJ4732 Recorded   11  Vyvanse 60 MG Oral Capsule; take 1 capsule daily; Therapy: (Recorded:91Ilf2340) to Recorded   12  Zaleplon 10 MG Oral Capsule; TAKE 1 TO 2 CAPSULES AT BEDTIME AS NEEDED; Last    Rx:58Zlf8514 Ordered    Family Psych History  Son    1  Family history of attention deficit hyperactivity disorder (ADHD) (V17 0) (Z81 8)  Sister    2  Family history of Bipolar 1 disorder  Family History    3  Family history of Asbestosis   4  Family history of Cancer   5  Family history of Diabetes Mellitus (V18 0)   6  Family history of Emphysema   7  Family history of Glaucoma   8  Family history of Heart Disease (V17 49)   9  Family history of Hypertension (V17 49)   10  Family history of Mixed Hyperlipoproteinemia    Social History    · College graduate   ·    · Employed   · Never A Smoker   · No history of alcohol use    Future Appointments    Date/Time Provider Specialty Site   06/22/2016 10:15 AM GEORGETTE Marie  Psychiatry [facility] PARTIAL HOSPITALIZATION   06/24/2016 10:15 AM GEORGETTE Marie   Psychiatry [facility] PARTIAL HOSPITALIZATION   07/14/2016 11:00 AM Paulette Hankins [place]     Signatures   Electronically signed by : Roland Waters RN; Jun 21 2016  2:02PM EST                       (Author)

## 2018-01-19 ENCOUNTER — GENERIC CONVERSION - ENCOUNTER (OUTPATIENT)
Dept: OTHER | Facility: OTHER | Age: 43
End: 2018-01-19

## 2018-01-19 ENCOUNTER — TRANSCRIBE ORDERS (OUTPATIENT)
Dept: ADMINISTRATIVE | Facility: HOSPITAL | Age: 43
End: 2018-01-19

## 2018-01-19 ENCOUNTER — APPOINTMENT (OUTPATIENT)
Dept: LAB | Facility: HOSPITAL | Age: 43
End: 2018-01-19
Attending: FAMILY MEDICINE
Payer: COMMERCIAL

## 2018-01-19 DIAGNOSIS — Z13.6 ENCOUNTER FOR SCREENING FOR CARDIOVASCULAR DISORDERS: ICD-10-CM

## 2018-01-19 DIAGNOSIS — F90.0 ATTENTION-DEFICIT HYPERACTIVITY DISORDER, PREDOMINANTLY INATTENTIVE TYPE: ICD-10-CM

## 2018-01-19 DIAGNOSIS — I10 ESSENTIAL (PRIMARY) HYPERTENSION: ICD-10-CM

## 2018-01-19 DIAGNOSIS — F41.8 OTHER SPECIFIED ANXIETY DISORDERS: ICD-10-CM

## 2018-01-19 LAB
ALBUMIN SERPL BCP-MCNC: 3.6 G/DL (ref 3.5–5)
ALP SERPL-CCNC: 77 U/L (ref 46–116)
ALT SERPL W P-5'-P-CCNC: 48 U/L (ref 12–78)
ANION GAP SERPL CALCULATED.3IONS-SCNC: 6 MMOL/L (ref 4–13)
AST SERPL W P-5'-P-CCNC: 34 U/L (ref 5–45)
BILIRUB SERPL-MCNC: 0.8 MG/DL (ref 0.2–1)
BUN SERPL-MCNC: 17 MG/DL (ref 5–25)
CALCIUM SERPL-MCNC: 8.5 MG/DL (ref 8.3–10.1)
CHLORIDE SERPL-SCNC: 101 MMOL/L (ref 100–108)
CHOLEST SERPL-MCNC: 211 MG/DL (ref 50–200)
CO2 SERPL-SCNC: 31 MMOL/L (ref 21–32)
CREAT SERPL-MCNC: 0.74 MG/DL (ref 0.6–1.3)
GFR SERPL CREATININE-BSD FRML MDRD: 99 ML/MIN/1.73SQ M
GLUCOSE P FAST SERPL-MCNC: 275 MG/DL (ref 65–99)
HDLC SERPL-MCNC: 41 MG/DL (ref 40–60)
LDLC SERPL CALC-MCNC: 126 MG/DL (ref 0–100)
POTASSIUM SERPL-SCNC: 3.5 MMOL/L (ref 3.5–5.3)
PROT SERPL-MCNC: 7 G/DL (ref 6.4–8.2)
SODIUM SERPL-SCNC: 138 MMOL/L (ref 136–145)
TRIGL SERPL-MCNC: 220 MG/DL
TSH SERPL DL<=0.05 MIU/L-ACNC: 1.71 UIU/ML (ref 0.36–3.74)

## 2018-01-19 PROCEDURE — 80061 LIPID PANEL: CPT

## 2018-01-19 PROCEDURE — 36415 COLL VENOUS BLD VENIPUNCTURE: CPT

## 2018-01-19 PROCEDURE — 80053 COMPREHEN METABOLIC PANEL: CPT

## 2018-01-19 PROCEDURE — 84443 ASSAY THYROID STIM HORMONE: CPT

## 2018-01-22 VITALS
DIASTOLIC BLOOD PRESSURE: 70 MMHG | WEIGHT: 159 LBS | HEART RATE: 76 BPM | BODY MASS INDEX: 32.05 KG/M2 | SYSTOLIC BLOOD PRESSURE: 110 MMHG | HEIGHT: 59 IN | TEMPERATURE: 97.2 F | RESPIRATION RATE: 18 BRPM

## 2018-01-23 NOTE — PROGRESS NOTES
Assessment    1  Suspicious nevus (238 2) (D22 9)    Plan  Suspicious nevus    · (Q) TISSUE PATHOLOGY; Status:Active; Requested EKY:[id number];    · Biopsy skin 1st lesion - POC; Status:Active - Perform Order; Requested ZGW:[id number];     Discussion/Summary    Lesion biopsied will follow results  Chief Complaint  R wrist area lesion noted rmklpn      History of Present Illness  HPI: pt states about a monrth or so ago she found a blister on her arm  states it seemed she had fluid in it  states it flattened out and changed color  states this has happened once before and ended up with it being cut out of her  pt states she called derm but they told her to see her doc or they would not be able to see her till march  Review of Systems    Constitutional: No fever, no chills, feels well, no tiredness, no recent weight gain or loss  ENT: no ear ache, no loss of hearing, no nosebleeds or nasal discharge, no sore throat or hoarseness  Cardiovascular: no complaints of slow or fast heart rate, no chest pain, no palpitations, no leg claudication or lower extremity edema  Respiratory: no complaints of shortness of breath, no wheezing, no dyspnea on exertion, no orthopnea or PND  Breasts: no complaints of breast pain, breast lump or nipple discharge  Gastrointestinal: no complaints of abdominal pain, no constipation, no nausea or diarrhea, no vomiting, no bloody stools  Genitourinary: no complaints of dysuria, no incontinence, no pelvic pain, no dysmenorrhea, no vaginal discharge or abnormal vaginal bleeding  Integumentary: skin lesion  Active Problems    1  Acute URI (465 9) (J06 9)   2  ADHD, predominantly inattentive type (314 00) (F90 0)   3  Allergic rhinitis (477 9) (J30 9)   4  Anxiety and depression (300 00,311) (F41 8)   5  Benign essential hypertension (401 1) (I10)   6  BMI 31 0-31 9,adult (V85 31) (Z68 31)   7  Cervicalgia (723 1) (M54 2)   8  Constipation (564 00) (K59 00)   9  Dysfunction of Eustachian tube, unspecified laterality (381 81) (H69 80)   10  Encounter for screening mammogram for malignant neoplasm of breast (V76 12)    (Z12 31)   11  FORTINO (generalized anxiety disorder) (300 02) (F41 1)   12  Genital herpes simplex (054 10) (A60 00)   13  Headache (784 0) (R51)   14  Hip pain, unspecified laterality   15  Hypokalemia (276 8) (E87 6)   16  Lumbago (724 2) (M54 5)   17  Lyme disease (088 81) (A69 20)   18  Motion sickness (994 6) (T75 3XXA)   19  Obesity, Class I, BMI 30-34 9 (278 00) (E66 9)   20  Other muscle spasm (728 85) (M62 838)   21  Pityriasis rosea (696 3) (L42)   22  Severe episode of recurrent major depressive disorder, without psychotic features    (296 33) (F33 2)   23  Tachycardia (785 0) (R00 0)   24  Thyroid disorder (246 9) (E07 9)   25  Well adult on routine health check (V70 0) (Z00 00)    Past Medical History    1  History of Acute bronchitis (466 0) (J20 9)   2  History of Acute pharyngitis (462) (J02 9)   3  History of Acute sinusitis (461 9) (J01 90)   4  History of Acute sinusitis (461 9) (J01 90)   5  History of Acute tonsillitis (463) (J03 90)   6  History of Acute upper respiratory infection (465 9) (J06 9)   7  History of Acute upper respiratory infection (465 9) (J06 9)   8  History of Acute UTI (599 0) (N39 0)   9  History of Cervicalgia (723 1) (M54 2)   10  Denied: History of Head trauma   11  History of acute pharyngitis (V12 69) (Z87 09)   12  History of migraine (V12 49) (Z86 69)   13  History of Nonallopathic lesion (739 9) (M99 9)   14  History of Palpitations (785 1) (R00 2)   15  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)   16  Denied: History of Seizure   17  History of Shoulder joint pain, unspecified laterality   18  Urinary tract infection (599 0) (N39 0)  Active Problems And Past Medical History Reviewed: The active problems and past medical history were reviewed and updated today  Family History  Son    1   Family history of attention deficit hyperactivity disorder (ADHD) (V17 0) (Z81 8)  Sister    2  Family history of Bipolar 1 disorder  Family History    3  Family history of Asbestosis   4  Family history of Cancer   5  Family history of Diabetes Mellitus (V18 0)   6  Family history of Emphysema   7  Family history of Glaucoma   8  Family history of Heart Disease (V17 49)   9  Family history of Hypertension (V17 49)   10  Family history of Mixed Hyperlipoproteinemia  Family History Reviewed: The family history was reviewed and updated today  Social History    · College graduate   ·    · Employed   · Never a smoker   · Never A Smoker   · No history of alcohol use  The social history was reviewed and updated today  Surgical History    1  History of  Section   2  History of Cholecystectomy   3  History of Nasal Septal Deviation Repair  Surgical History Reviewed: The surgical history was reviewed and updated today  Current Meds   1  Albuterol 90 MCG/ACT AERS; INHALE 1 TO 2 PUFFS EVERY 4 TO 6 HOURS AS   NEEDED; Therapy: (Recorded:2016) to Recorded   2  BuPROPion HCl ER (XL) 300 MG Oral Tablet Extended Release 24 Hour; TAKE 1   TABLET Daily thity tablets; Therapy: 42AFG5349 to (Evaluate:2016)  Requested for: 96KWY1635; Last   Rx:2016 Ordered   3  CloNIDine HCl - 0 1 MG Oral Tablet; Take one tablet PO  AT 6:00 PM and 1 tablet PO AT   9:00 pm;   Therapy: (Recorded:12Loo4902) to Recorded   4  Griselda 0 35 MG Oral Tablet; TAKE 1 TABLET DAILY; Therapy: 09EEL7143 to Recorded   5  Flonase 50 MCG/ACT SUSP; USE 1 SPRAY IN EACH NOSTRIL ONCE DAILY; Therapy: (Recorded:2016) to Recorded   6  Gabapentin 300 MG Oral Capsule; TAKE 3 CAPSULE Bedtime; Last Rx:2016   Ordered   7  HydrOXYzine HCl - 50 MG Oral Tablet; TAKE 2 TABLET Bedtime; Last Rx:45Miq9552   Ordered   8  Metoprolol Succinate ER 50 MG Oral Tablet Extended Release 24 Hour; TAKE 1 TABLET   DAILY;    Therapy: 90DZC7291 to (Evaluate:34Tud1226)  Requested for: 13Tsp3258; Last   Rx:18Stf6568 Ordered   9  Multivitamins TABS; 1 Every Day; Therapy: 49YLU7952 to  Requested for: 66ABM1581 Recorded   10  Mydayis 50 MG Oral Capsule Extended Release 24 Hour; 1 DAILY; Therapy: 57WHY3079 to Recorded   11  Pristiq 100 MG Oral Tablet Extended Release 24 Hour; Take 1 tablet daily; Therapy: 35BKO0826 to  Requested for: 73ERO4655 Recorded   12  Zaleplon 10 MG Oral Capsule; TAKE 1 TO 2 CAPSULES AT BEDTIME AS NEEDED; Last    Rx:67Nvk6490 Ordered    The medication list was reviewed and updated today  Allergies    1  Percocet TABS    Vitals   Recorded: 54RIS8630 09:31AM   Temperature 97 2 F   Heart Rate 76   Respiration 18   Systolic 401   Diastolic 70   Height 4 ft 11 in   Weight 159 lb    BMI Calculated 32 11   BSA Calculated 1 67     Physical Exam    Constitutional   General appearance: No acute distress, well appearing and well nourished  Eyes   Conjunctiva and lids: No swelling, erythema or discharge  Pupils and irises: Equal, round and reactive to light  Ears, Nose, Mouth, and Throat   External inspection of ears and nose: Normal     Otoscopic examination: Tympanic membranes translucent with normal light reflex  Canals patent without erythema  Pulmonary   Auscultation of lungs: Clear to auscultation  Cardiovascular   Auscultation of heart: Normal rate and rhythm, normal S1 and S2, without murmurs  Abdomen   Abdomen: Non-tender, no masses  Lymphatic   Palpation of lymph nodes in neck: No lymphadenopathy  Skin   Examination of the skin for lesions: Abnormal   small red lesion on distal rt arm, regular boarder, jose rafael  Procedure          Procedure: skin biopsy  Indications for the procedure include the lesion has changed and squamous cell carcinoma suspected  Risks were discussed with the patient  Written consent was obtained prior to the procedure  Verbal consent    Procedure Note:  Anesthesia: 2 ml of lidocaine 1% with epinephrine  The lesion was located on the rt distal arm  The patient was prepped and draped in the usual sterile fashion using alcohol  a 2 mm punch biopsy of the lesion was taken  Dressing: The wound was cleaned and polysporin ointment was applied  Specimen: the excised lesion was place in buffered formalin and sent for pathology  Post-Procedure:   Patient Status: the patient tolerated the procedure well  Complications: there were no complications  Follow-up in the office as needed                Signatures   Electronically signed by : Willow Connolly DO; Sep 26 2017  9:59AM EST                       (Author)

## 2018-01-23 NOTE — RESULT NOTES
Discussion/Summary   lipids are elevated as well as sugar  i am adding an additional lab to draw to test for diabetes  Would like follow up if diabetes is positive     Verified Results  (1) COMPREHENSIVE METABOLIC PANEL 35ZIO6013 59:72XL Michael Doyle Order Number: [id number]     Test Name Result Flag Reference   SODIUM 138 mmol/L  136-145   POTASSIUM 3 5 mmol/L  3 5-5 3   CHLORIDE 101 mmol/L  100-108   CARBON DIOXIDE 31 mmol/L  21-32   ANION GAP (CALC) 6 mmol/L  4-13   BLOOD UREA NITROGEN 17 mg/dL  5-25   CREATININE 0 74 mg/dL  0 60-1 30   Standardized to IDMS reference method   CALCIUM 8 5 mg/dL  8 3-10 1   BILI, TOTAL 0 80 mg/dL  0 20-1 00   ALK PHOSPHATAS 77 U/L     ALT (SGPT) 48 U/L  12-78   Specimen collection should occur prior to Sulfasalazine administration due to the potential for falsely depressed results  AST(SGOT) 34 U/L  5-45   Specimen collection should occur prior to Sulfasalazine administration due to the potential for falsely depressed results  ALBUMIN 3 6 g/dL  3 5-5 0   TOTAL PROTEIN 7 0 g/dL  6 4-8 2   eGFR 99 ml/min/1 73sq m     National Kidney Disease Education Program recommendations are as follows:  GFR calculation is accurate only with a steady state creatinine  Chronic Kidney disease less than 60 ml/min/1 73 sq  meters  Kidney failure less than 15 ml/min/1 73 sq  meters  GLUCOSE FASTING 275 mg/dL H 65-99   Specimen collection should occur prior to Sulfasalazine administration due to the potential for falsely depressed results  Specimen collection should occur prior to Sulfapyridine administration due to the potential for falsely elevated results       (1) LIPID PANEL, FASTING 72WMY0026 10:46AM Michael Doyle Order Number: [id number]     Test Name Result Flag Reference   CHOLESTEROL 211 mg/dL H    Cholesterol:    Desirable <200 mg/dl    Borderline 200-239 mg/dl    High>239   HDL,DIRECT 41 mg/dL  40-60   HDL Cholesterol:    High>59 mg/dL Low <41 mg/dL   LDL CHOLESTEROL CALCULATED 126 mg/dL H 0-100   This screening LDL is a calculated result  It does not have the accuracy of the Direct Measured LDL in the monitoring of patients with hyperlipidemia and/or statin therapy  Direct Measure LDL (XXE213) must be ordered separately in these patients  Triglyceride:        Normal <150 mg/dl   Borderline High 150-199 mg/dl   High 200-499 mg/dl   Very High >499 mg/dl   TRIGLYCERIDES 220 mg/dL H <=150   Specimen collection should occur prior to N-Acetylcysteine or Metamizole administration due to the potential for falsely depressed results  (1) TSH 78LJD0860 10:46AM Wilman Paez Order Number: [id number]     Test Name Result Flag Reference   TSH 1 713 uIU/mL  0 358-3 740   Patients undergoing fluorescein dye angiography may retain small amounts of fluorescein in the body for 48-72 hours post procedure  Samples containing fluorescein can produce falsely depressed TSH values  If the patient had this procedure,a specimen should be resubmitted post fluorescein clearance  The recommended reference ranges for TSH during pregnancy are as follows:  First trimester 0 1 to 2 5 uIU/mL  Second trimester  0 2 to 3 0 uIU/mL  Third trimester 0 3 to 3 0 uIU/m       Plan  Abnormal glucose    · (LC) Hemoglobin A1c; Status:Active;  Requested XTK:[id number];

## 2018-01-24 VITALS
HEIGHT: 59 IN | BODY MASS INDEX: 31.04 KG/M2 | WEIGHT: 154 LBS | DIASTOLIC BLOOD PRESSURE: 70 MMHG | RESPIRATION RATE: 18 BRPM | SYSTOLIC BLOOD PRESSURE: 120 MMHG | TEMPERATURE: 97.2 F | HEART RATE: 78 BPM

## 2018-01-26 LAB
EST. AVERAGE GLUCOSE BLD GHB EST-MCNC: 295 MG/DL
HBA1C MFR BLD: 11.9 %HB

## 2018-02-01 ENCOUNTER — TELEPHONE (OUTPATIENT)
Dept: FAMILY MEDICINE CLINIC | Facility: CLINIC | Age: 43
End: 2018-02-01

## 2018-02-02 NOTE — TELEPHONE ENCOUNTER
Looks like the A1c came back in diabetic range she should have an appt to discuss and initiate treatment

## 2018-02-02 NOTE — TELEPHONE ENCOUNTER
I called the lab and they said there was no order  I do shee the order in al scripts should we re-order?

## 2018-02-05 ENCOUNTER — OFFICE VISIT (OUTPATIENT)
Dept: FAMILY MEDICINE CLINIC | Facility: CLINIC | Age: 43
End: 2018-02-05
Payer: COMMERCIAL

## 2018-02-05 VITALS
BODY MASS INDEX: 30.84 KG/M2 | RESPIRATION RATE: 16 BRPM | HEIGHT: 59 IN | HEART RATE: 84 BPM | TEMPERATURE: 97.8 F | SYSTOLIC BLOOD PRESSURE: 122 MMHG | WEIGHT: 153 LBS | DIASTOLIC BLOOD PRESSURE: 76 MMHG

## 2018-02-05 DIAGNOSIS — I10 BENIGN ESSENTIAL HYPERTENSION: ICD-10-CM

## 2018-02-05 DIAGNOSIS — E11.9 TYPE 2 DIABETES MELLITUS WITHOUT COMPLICATION, WITHOUT LONG-TERM CURRENT USE OF INSULIN (HCC): Primary | ICD-10-CM

## 2018-02-05 DIAGNOSIS — E78.2 MIXED HYPERLIPIDEMIA: ICD-10-CM

## 2018-02-05 PROBLEM — R73.09 ABNORMAL GLUCOSE: Status: ACTIVE | Noted: 2018-01-19

## 2018-02-05 PROBLEM — F32.A MODERATELY SEVERE DEPRESSION: Status: ACTIVE | Noted: 2018-01-12

## 2018-02-05 PROBLEM — R41.89 COGNITIVE DEFICITS: Status: ACTIVE | Noted: 2018-01-12

## 2018-02-05 PROBLEM — R73.09 ABNORMAL GLUCOSE: Status: RESOLVED | Noted: 2018-01-19 | Resolved: 2018-02-05

## 2018-02-05 PROBLEM — E28.2 PCOS (POLYCYSTIC OVARIAN SYNDROME): Status: ACTIVE | Noted: 2018-01-12

## 2018-02-05 PROCEDURE — 99214 OFFICE O/P EST MOD 30 MIN: CPT | Performed by: FAMILY MEDICINE

## 2018-02-05 RX ORDER — GABAPENTIN 300 MG/1
3 CAPSULE ORAL DAILY
COMMUNITY

## 2018-02-05 RX ORDER — RAMIPRIL 1.25 MG/1
1.25 CAPSULE ORAL DAILY
Qty: 90 CAPSULE | Refills: 1 | Status: SHIPPED | OUTPATIENT
Start: 2018-02-05 | End: 2018-08-03 | Stop reason: SDUPTHER

## 2018-02-05 RX ORDER — ATORVASTATIN CALCIUM 20 MG/1
20 TABLET, FILM COATED ORAL DAILY
Qty: 90 TABLET | Refills: 1 | Status: SHIPPED | OUTPATIENT
Start: 2018-02-05 | End: 2018-08-07 | Stop reason: SDUPTHER

## 2018-02-05 RX ORDER — CLONIDINE HYDROCHLORIDE 0.1 MG/1
1 TABLET ORAL DAILY
COMMUNITY

## 2018-02-05 RX ORDER — ASPIRIN 81 MG/1
81 TABLET ORAL DAILY
Qty: 1 TABLET | Refills: 0
Start: 2018-02-05

## 2018-02-05 RX ORDER — HYDROXYZINE 50 MG/1
2 TABLET, FILM COATED ORAL DAILY
COMMUNITY
End: 2021-12-17

## 2018-02-05 NOTE — PATIENT INSTRUCTIONS
Diabetes in the Older Adult   WHAT YOU NEED TO KNOW:   What do I need to know if I am an older adult with diabetes? Older adults with diabetes are at risk for heart disease, stroke, kidney disease, blindness, and nerve damage  You may also be at risk for any of the following:  · Poor nutrition or low blood sugar levels    · Confusion or problems with memory, attention, or learning new things    · Trouble controlling urination or frequent urinary tract infections    · Trouble with coordination or balance    · Falls and injuries    · Pain    · Depression    · Open sores on your legs or feet  What are the ABCs of diabetes? The ABCs stand for certain things you can do to manage or prevent problems caused by diabetes:  · A  stands for A1c test   This test shows the average amount of sugar in your blood over the past 2 to 3 months  High levels of sugar in your blood can cause damage to your heart, blood vessels, kidneys, feet, and eyes  Most older adults with diabetes should have an A1c level less than 7 5  Ask your healthcare provider if this A1c goal is right for you  Your provider can help you make changes if your A1c is too high  · B  stands for blood pressure   High blood pressure can increase your risk for a heart attack, stroke, or kidney disease  Most older adults with diabetes should have a systolic blood pressure (first number) of 140  Your diastolic blood pressure (second number) should be below 90  Ask your healthcare provider if these blood pressure goals are right for you  · C  stands for cholesterol   High levels of cholesterol can block your arteries and cause a heart attack or stroke  Ask your healthcare provider what your cholesterol levels should be  · S  stands for stop smoking   Nicotine and other chemicals in cigarettes and cigars can cause lung damage and make it more difficult to manage your diabetes  What can I do to manage the ABCs and prevent problems caused by diabetes?    · Check your blood sugar levels as directed  Your healthcare provider will tell you when and how often to check during the day  Your healthcare provider will also tell you what your blood sugar levels should be before and after a meal  You may need to check for ketones in your urine or blood if your level is higher than directed  Write down your results and show them to your healthcare provider  Your provider may use the results to make changes to your medicine, food, or exercise schedules  Ask your healthcare provider for more information about how to treat a high or low blood sugar level  · Follow your meal plan as directed  A dietitian will help you make a meal plan to keep your blood sugar level steady and make sure you get enough nutrition  Do not skip meals  Your blood sugar level may drop too low if you have taken diabetes medicine and do not eat  Ask your healthcare provider about programs in your community that can deliver the meals to your home  · Try to be active for 30 to 60 minutes most days of the week  Exercise can help keep your blood sugar level steady, decrease your risk of heart disease, and help you lose weight  It can also help improve your balance and decrease your risk for falls  Work with your healthcare provider to create an exercise plan  Ask a family member or friend to exercise with you  Start slow and exercise for 5 to 10 minutes at a time  Examples of activities include walking or swimming  Include muscle strengthening activities 2 to 3 days each week  Include balance training 2 to 3 times each week  Activities that help increase balance include yoga and salvatore chi      · Maintain a healthy weight  Ask your healthcare provider how much you should weigh  A healthy weight can help you control your diabetes and prevent heart disease  Ask your provider to help you create a weight loss plan if you are overweight  Together you can set manageable weight loss goals  · Do not smoke  Ask your healthcare provider for information if you currently smoke and need help to quit  Do not use e-cigarettes or smokeless tobacco in place of cigarettes or to help you quit  They still contain nicotine  · Manage stress  Stress may increase your blood sugar level  Deep breathing, muscle relaxation, and music may help you relax  Ask your healthcare provider for more information about these practices  What else can I do to manage my diabetes? · Check your feet every day for sores  Look at your whole foot, including the bottom, and between and under your toes  Check for wounds, corns, and calluses  Use a mirror to see the bottom of your feet  The skin on your feet may be shiny, tight, dry, or darker than normal  Your feet may also be cold and pale  Feel your feet by running your hands along the tops, bottoms, sides, and between your toes  Redness, swelling, and warmth are signs of blood flow problems that can lead to a foot ulcer  Do not try to remove corns or calluses yourself  · Wear medical alert identification  Wear medical alert jewelry or carry a card that says you have diabetes  Ask your healthcare provider where to get these items  · Ask about vaccines  You have a higher risk for serious illness if you get the flu, pneumonia, or hepatitis  Ask your healthcare provider if you should get a flu, pneumonia, shingles, or hepatitis B vaccine, and when to get the vaccine  · Keep all appointments  You may need to return to have your A1c checked every 3 months  You will need to return at least once each year to have your feet checked  You will need an eye exam once a year to check for retinopathy  You will also need urine tests every year to check for kidney problems  You may need tests to monitor for heart disease  Write down your questions so you remember to ask them during your visits  · Get help from family and friends    You may need help checking your blood sugar level, giving insulin injections, or preparing your meals  Ask your family and friends to help you with these tasks  Talk to your healthcare provider if you do not have someone at home to help you  A healthcare provider can come to your home to help you with these tasks  Call 911 for any of the following:   · You have any of the following signs of a stroke:      ¨ Numbness or drooping on one side of your face     ¨ Weakness in an arm or leg    ¨ Confusion or difficulty speaking    ¨ Dizziness, a severe headache, or vision loss    · You have any of the following signs of a heart attack:      ¨ Squeezing, pressure, or pain in your chest that lasts longer than 5 minutes or returns    ¨ Discomfort or pain in your back, neck, jaw, stomach, or arm     ¨ Trouble breathing    ¨ Nausea or vomiting    ¨ Lightheadedness or a sudden cold sweat, especially with chest pain or trouble breathing  When should I seek immediate care? · You have severe abdominal pain, or the pain spreads to your back  You may also be vomiting  · You have trouble staying awake or focusing  · You are shaking or sweating  · You have blurred or double vision  · Your breath has a fruity, sweet smell  · Your breathing is deep and labored, or rapid and shallow  · Your heartbeat is fast and weak  · You fall and get hurt  When should I contact my healthcare provider? · You are vomiting or have diarrhea  · You have an upset stomach and cannot eat the foods on your meal plan  · You feel weak or more tired than usual      · You feel dizzy, have headaches, or are easily irritated  · Your skin is red, warm, dry, or swollen  · You have a wound that does not heal      · You have numbness in your arms or legs  · You have trouble coping with your illness, or you feel anxious or depressed  · You have problems with your memory  · You have changes in your vision       · You have questions or concerns about your condition or care  CARE AGREEMENT:   You have the right to help plan your care  Learn about your health condition and how it may be treated  Discuss treatment options with your caregivers to decide what care you want to receive  You always have the right to refuse treatment  The above information is an  only  It is not intended as medical advice for individual conditions or treatments  Talk to your doctor, nurse or pharmacist before following any medical regimen to see if it is safe and effective for you  © 2017 [employer]  Information is for End User's use only and may not be sold, redistributed or otherwise used for commercial purposes  All illustrations and images included in CareNotes® are the copyrighted property of [employer]  or [employer]

## 2018-02-05 NOTE — PROGRESS NOTES
Assessment/Plan:    No problem-specific Assessment & Plan notes found for this encounter  Diagnoses and all orders for this visit:    Type 2 diabetes mellitus without complication, without long-term current use of insulin (HCC)  -     aspirin (ECOTRIN LOW STRENGTH) 81 mg EC tablet; Take 1 tablet (81 mg total) by mouth daily  -     ramipril (ALTACE) 1 25 mg capsule; Take 1 capsule (1 25 mg total) by mouth daily for 180 days  -     atorvastatin (LIPITOR) 20 mg tablet; Take 1 tablet (20 mg total) by mouth daily for 180 days  -     metFORMIN (GLUCOPHAGE) 1000 MG tablet; Take 1 tablet (1,000 mg total) by mouth 2 (two) times a day with meals for 180 days  -     Ambulatory referral to Podiatry; Future  -     Hemoglobin A1c; Future  -     Microalbumin / creatinine urine ratio  -     Comprehensive metabolic panel; Future  -     Lipid Panel with Direct LDL reflex; Future    Benign essential hypertension  -     ramipril (ALTACE) 1 25 mg capsule; Take 1 capsule (1 25 mg total) by mouth daily for 180 days    Mixed hyperlipidemia  -     atorvastatin (LIPITOR) 20 mg tablet; Take 1 tablet (20 mg total) by mouth daily for 180 days  -     Lipid Panel with Direct LDL reflex; Future          Patient Instructions     Diabetes in the Older Adult   WHAT YOU NEED TO KNOW:   What do I need to know if I am an older adult with diabetes? Older adults with diabetes are at risk for heart disease, stroke, kidney disease, blindness, and nerve damage  You may also be at risk for any of the following:  · Poor nutrition or low blood sugar levels    · Confusion or problems with memory, attention, or learning new things    · Trouble controlling urination or frequent urinary tract infections    · Trouble with coordination or balance    · Falls and injuries    · Pain    · Depression    · Open sores on your legs or feet  What are the ABCs of diabetes?   The ABCs stand for certain things you can do to manage or prevent problems caused by diabetes:  · A  stands for A1c test   This test shows the average amount of sugar in your blood over the past 2 to 3 months  High levels of sugar in your blood can cause damage to your heart, blood vessels, kidneys, feet, and eyes  Most older adults with diabetes should have an A1c level less than 7 5  Ask your healthcare provider if this A1c goal is right for you  Your provider can help you make changes if your A1c is too high  · B  stands for blood pressure   High blood pressure can increase your risk for a heart attack, stroke, or kidney disease  Most older adults with diabetes should have a systolic blood pressure (first number) of 140  Your diastolic blood pressure (second number) should be below 90  Ask your healthcare provider if these blood pressure goals are right for you  · C  stands for cholesterol   High levels of cholesterol can block your arteries and cause a heart attack or stroke  Ask your healthcare provider what your cholesterol levels should be  · S  stands for stop smoking   Nicotine and other chemicals in cigarettes and cigars can cause lung damage and make it more difficult to manage your diabetes  What can I do to manage the ABCs and prevent problems caused by diabetes? · Check your blood sugar levels as directed  Your healthcare provider will tell you when and how often to check during the day  Your healthcare provider will also tell you what your blood sugar levels should be before and after a meal  You may need to check for ketones in your urine or blood if your level is higher than directed  Write down your results and show them to your healthcare provider  Your provider may use the results to make changes to your medicine, food, or exercise schedules  Ask your healthcare provider for more information about how to treat a high or low blood sugar level  · Follow your meal plan as directed    A dietitian will help you make a meal plan to keep your blood sugar level steady and make sure you get enough nutrition  Do not skip meals  Your blood sugar level may drop too low if you have taken diabetes medicine and do not eat  Ask your healthcare provider about programs in your community that can deliver the meals to your home  · Try to be active for 30 to 60 minutes most days of the week  Exercise can help keep your blood sugar level steady, decrease your risk of heart disease, and help you lose weight  It can also help improve your balance and decrease your risk for falls  Work with your healthcare provider to create an exercise plan  Ask a family member or friend to exercise with you  Start slow and exercise for 5 to 10 minutes at a time  Examples of activities include walking or swimming  Include muscle strengthening activities 2 to 3 days each week  Include balance training 2 to 3 times each week  Activities that help increase balance include yoga and salvatore chi      · Maintain a healthy weight  Ask your healthcare provider how much you should weigh  A healthy weight can help you control your diabetes and prevent heart disease  Ask your provider to help you create a weight loss plan if you are overweight  Together you can set manageable weight loss goals  · Do not smoke  Ask your healthcare provider for information if you currently smoke and need help to quit  Do not use e-cigarettes or smokeless tobacco in place of cigarettes or to help you quit  They still contain nicotine  · Manage stress  Stress may increase your blood sugar level  Deep breathing, muscle relaxation, and music may help you relax  Ask your healthcare provider for more information about these practices  What else can I do to manage my diabetes? · Check your feet every day for sores  Look at your whole foot, including the bottom, and between and under your toes  Check for wounds, corns, and calluses  Use a mirror to see the bottom of your feet   The skin on your feet may be shiny, tight, dry, or darker than normal  Your feet may also be cold and pale  Feel your feet by running your hands along the tops, bottoms, sides, and between your toes  Redness, swelling, and warmth are signs of blood flow problems that can lead to a foot ulcer  Do not try to remove corns or calluses yourself  · Wear medical alert identification  Wear medical alert jewelry or carry a card that says you have diabetes  Ask your healthcare provider where to get these items  · Ask about vaccines  You have a higher risk for serious illness if you get the flu, pneumonia, or hepatitis  Ask your healthcare provider if you should get a flu, pneumonia, shingles, or hepatitis B vaccine, and when to get the vaccine  · Keep all appointments  You may need to return to have your A1c checked every 3 months  You will need to return at least once each year to have your feet checked  You will need an eye exam once a year to check for retinopathy  You will also need urine tests every year to check for kidney problems  You may need tests to monitor for heart disease  Write down your questions so you remember to ask them during your visits  · Get help from family and friends  You may need help checking your blood sugar level, giving insulin injections, or preparing your meals  Ask your family and friends to help you with these tasks  Talk to your healthcare provider if you do not have someone at home to help you  A healthcare provider can come to your home to help you with these tasks    Call 911 for any of the following:   · You have any of the following signs of a stroke:      ¨ Numbness or drooping on one side of your face     ¨ Weakness in an arm or leg    ¨ Confusion or difficulty speaking    ¨ Dizziness, a severe headache, or vision loss    · You have any of the following signs of a heart attack:      ¨ Squeezing, pressure, or pain in your chest that lasts longer than 5 minutes or returns    ¨ Discomfort or pain in your back, neck, jaw, stomach, or arm     ¨ Trouble breathing    ¨ Nausea or vomiting    ¨ Lightheadedness or a sudden cold sweat, especially with chest pain or trouble breathing  When should I seek immediate care? · You have severe abdominal pain, or the pain spreads to your back  You may also be vomiting  · You have trouble staying awake or focusing  · You are shaking or sweating  · You have blurred or double vision  · Your breath has a fruity, sweet smell  · Your breathing is deep and labored, or rapid and shallow  · Your heartbeat is fast and weak  · You fall and get hurt  When should I contact my healthcare provider? · You are vomiting or have diarrhea  · You have an upset stomach and cannot eat the foods on your meal plan  · You feel weak or more tired than usual      · You feel dizzy, have headaches, or are easily irritated  · Your skin is red, warm, dry, or swollen  · You have a wound that does not heal      · You have numbness in your arms or legs  · You have trouble coping with your illness, or you feel anxious or depressed  · You have problems with your memory  · You have changes in your vision  · You have questions or concerns about your condition or care  CARE AGREEMENT:   You have the right to help plan your care  Learn about your health condition and how it may be treated  Discuss treatment options with your caregivers to decide what care you want to receive  You always have the right to refuse treatment  The above information is an  only  It is not intended as medical advice for individual conditions or treatments  Talk to your doctor, nurse or pharmacist before following any medical regimen to see if it is safe and effective for you  © 2017 [facility] Information is for End User's use only and may not be sold, redistributed or otherwise used for commercial purposes   All illustrations and images included in CareNotes® are the copyrighted property of [employer]  or [employer]  Return in about 3 months (around 5/5/2018) for Recheck  Subjective:      Patient ID: Dayton Gambino is a 38 y o  female  Chief Complaint   Patient presents with    Follow-up     review new labs       Pt is here to discuss labs at out request    Pt states she went through her symptoms and feels she may have metabolic    Pt has been seeing dr Enrrique Castellanos since having gest diabetes  she4 has seen him within the last 6 months      The a1c was 11 9 as per verbal from lab cor        The following portions of the patient's history were reviewed and updated as appropriate: allergies, current medications, past family history, past medical history, past social history, past surgical history and problem list     Review of Systems   Constitutional: Negative for activity change, appetite change, chills, diaphoresis, fatigue and fever  HENT: Negative for congestion, facial swelling, rhinorrhea, sinus pressure and sore throat  Eyes: Negative for discharge and itching  Respiratory: Negative for apnea and shortness of breath  Gastrointestinal: Negative for abdominal distention and abdominal pain  Endocrine: Negative for cold intolerance and heat intolerance  Genitourinary: Negative for difficulty urinating  Musculoskeletal: Negative for arthralgias  Neurological: Negative for dizziness  Current Outpatient Prescriptions   Medication Sig Dispense Refill    albuterol (PROVENTIL HFA,VENTOLIN HFA) 90 mcg/act inhaler Inhale 2 puffs every 4 (four) hours as needed for wheezing Indications: Asthma  1 Inhaler 0    Amphet-Dextroamphet 3-Bead ER (MYDAYIS) 50 MG CP24 Take by mouth daily      buPROPion (FORFIVO XL) 450 MG 24 hr tablet Take 1 tablet (450 mg total) by mouth daily Indications: Major Depressive Disorder   At 9AM 30 tablet 1    cloNIDine (CATAPRES) 0 1 mg tablet Take by mouth      desvenlafaxine (PRISTIQ) 100 mg 24 hr tablet Take 1 tablet (100 mg total) by mouth daily Indications: Major Depressive Disorder  At 9AM 30 tablet 1    fluticasone (FLONASE) 50 mcg/act nasal spray 1 spray into each nostril daily as needed for rhinitis Indications: Hayfever  16 g 0    gabapentin (NEURONTIN) 300 mg capsule Take 3 capsules by mouth      hydrOXYzine HCL (ATARAX) 50 mg tablet Take 2 tablets by mouth      metoprolol succinate (TOPROL-XL) 50 mg 24 hr tablet Take 1 tablet (50 mg total) by mouth daily Indications: High Blood Pressure  At 9AM 30 tablet 1    Multiple Vitamin (MULTIVITAMINS PO) Take by mouth daily      norethindrone (MICRONOR) 0 35 MG tablet Take 1 tablet (0 35 mg total) by mouth daily at bedtime Indications: Birth Control  28 tablet 0    zaleplon (SONATA) 10 MG capsule Take 1 capsule (10 mg total) by mouth daily at bedtime Indications: Trouble Sleeping  10 capsule 0    aspirin (ECOTRIN LOW STRENGTH) 81 mg EC tablet Take 1 tablet (81 mg total) by mouth daily 1 tablet 0    atorvastatin (LIPITOR) 20 mg tablet Take 1 tablet (20 mg total) by mouth daily for 180 days 90 tablet 1    clonazePAM (KlonoPIN) 1 mg tablet Take 1 tablet (1 mg total) by mouth 2 (two) times a day Indications: Anxiety  At 12:30PM & 10:30PM 30 tablet 0    lisdexamfetamine (VYVANSE) 60 MG capsule Take 1 capsule (60 mg total) by mouth daily Indications: ADHD Earliest Fill Date: 5/19/16  Max Daily Amount: 60 mg 10 capsule 0    metFORMIN (GLUCOPHAGE) 1000 MG tablet Take 1 tablet (1,000 mg total) by mouth 2 (two) times a day with meals for 180 days 180 tablet 1    ramipril (ALTACE) 1 25 mg capsule Take 1 capsule (1 25 mg total) by mouth daily for 180 days 90 capsule 1     No current facility-administered medications for this visit          Objective:    /76   Pulse 84   Temp 97 8 °F (36 6 °C)   Resp 16   Ht 4' 11" (1 499 m)   Wt 69 4 kg (153 lb)   BMI 30 90 kg/m²        Physical Exam   Constitutional: She appears well-developed and well-nourished  HENT:   Head: Normocephalic  Eyes: Conjunctivae are normal  Pupils are equal, round, and reactive to light  Neck: Normal range of motion  No thyromegaly present  Cardiovascular: Normal rate  Pulses are no weak pulses  Pulses:       Dorsalis pedis pulses are 2+ on the right side, and 2+ on the left side  Posterior tibial pulses are 2+ on the right side, and 2+ on the left side  Pulmonary/Chest: Effort normal    Abdominal: Soft  Musculoskeletal: Normal range of motion  Feet:   Right Foot:   Skin Integrity: Negative for ulcer, skin breakdown, erythema, warmth, callus or dry skin  Left Foot:   Skin Integrity: Negative for ulcer, skin breakdown, erythema, warmth, callus or dry skin  Neurological: She is alert  Skin: Skin is warm  She is not diaphoretic  Nursing note and vitals reviewed  Patient's shoes and socks removed  Right Foot/Ankle   Right Foot Inspection  Skin Exam: skin normal and skin intact no dry skin, no warmth, no callus, no erythema, no maceration, no abnormal color, no pre-ulcer, no ulcer and no callus                          Toe Exam: ROM and strength within normal limits  Sensory   Vibration: intact  Proprioception: intact   Monofilament testing: intact  Vascular  Capillary refills: < 3 seconds  The right DP pulse is 2+  The right PT pulse is 2+  Left Foot/Ankle  Left Foot Inspection  Skin Exam: skin normal and skin intactno dry skin, no warmth, no erythema, no maceration, normal color, no pre-ulcer, no ulcer and no callus                         Toe Exam: ROM and strength within normal limits                   Sensory   Vibration: intact  Proprioception: intact  Monofilament: intact  Vascular  Capillary refills: < 3 seconds  The left DP pulse is 2+  The left PT pulse is 2+  Assign Risk Category:  No deformity present; No loss of protective sensation;  No weak pulses       Risk: 0    converstaion had with pt about daibetc diet as well as seeing podiatry as well as cathi Hinkle, DO

## 2018-02-27 ENCOUNTER — OFFICE VISIT (OUTPATIENT)
Dept: FAMILY MEDICINE CLINIC | Facility: CLINIC | Age: 43
End: 2018-02-27
Payer: COMMERCIAL

## 2018-02-27 VITALS
RESPIRATION RATE: 20 BRPM | HEIGHT: 59 IN | BODY MASS INDEX: 30.84 KG/M2 | HEART RATE: 88 BPM | SYSTOLIC BLOOD PRESSURE: 134 MMHG | TEMPERATURE: 98.6 F | WEIGHT: 153 LBS | DIASTOLIC BLOOD PRESSURE: 84 MMHG

## 2018-02-27 DIAGNOSIS — J01.10 ACUTE NON-RECURRENT FRONTAL SINUSITIS: ICD-10-CM

## 2018-02-27 DIAGNOSIS — F33.2 SEVERE EPISODE OF RECURRENT MAJOR DEPRESSIVE DISORDER, WITHOUT PSYCHOTIC FEATURES (HCC): ICD-10-CM

## 2018-02-27 DIAGNOSIS — N10 ACUTE PYELONEPHRITIS: Primary | ICD-10-CM

## 2018-02-27 LAB
SL AMB  POCT GLUCOSE, UA: 100
SL AMB LEUKOCYTE ESTERASE,UA: 500
SL AMB POCT BILIRUBIN,UA: 1
SL AMB POCT BLOOD,UA: 50
SL AMB POCT CLARITY,UA: CLEAR
SL AMB POCT COLOR,UA: ABNORMAL
SL AMB POCT KETONES,UA: ABNORMAL
SL AMB POCT NITRITE,UA: ABNORMAL
SL AMB POCT PH,UA: 5
SL AMB POCT SPECIFIC GRAVITY,UA: 1.02
SL AMB POCT URINE PROTEIN: ABNORMAL
SL AMB POCT UROBILINOGEN: 1

## 2018-02-27 PROCEDURE — 81003 URINALYSIS AUTO W/O SCOPE: CPT | Performed by: NURSE PRACTITIONER

## 2018-02-27 PROCEDURE — 99213 OFFICE O/P EST LOW 20 MIN: CPT | Performed by: NURSE PRACTITIONER

## 2018-02-27 RX ORDER — CIPROFLOXACIN 500 MG/1
500 TABLET, FILM COATED ORAL EVERY 12 HOURS SCHEDULED
Qty: 14 TABLET | Refills: 0 | Status: SHIPPED | OUTPATIENT
Start: 2018-02-27 | End: 2018-03-06

## 2018-02-27 RX ORDER — ONDANSETRON HYDROCHLORIDE 8 MG/1
8 TABLET, FILM COATED ORAL EVERY 8 HOURS PRN
Qty: 20 TABLET | Refills: 0 | Status: SHIPPED | OUTPATIENT
Start: 2018-02-27 | End: 2019-04-30 | Stop reason: ALTCHOICE

## 2018-02-27 RX ORDER — BUPROPION HYDROCHLORIDE 450 MG/1
TABLET, FILM COATED, EXTENDED RELEASE ORAL
COMMUNITY
Start: 2018-02-27 | End: 2018-06-19 | Stop reason: SDUPTHER

## 2018-02-27 NOTE — PROGRESS NOTES
Assessment/Plan:         Diagnoses and all orders for this visit:    Acute pyelonephritis  -     ciprofloxacin (CIPRO) 500 mg tablet; Take 1 tablet (500 mg total) by mouth every 12 (twelve) hours for 7 days  -     ondansetron (ZOFRAN) 8 mg tablet; Take 1 tablet (8 mg total) by mouth every 8 (eight) hours as needed for nausea or vomiting  -     POCT urine dip auto non-scope  -     Urine culture    Acute non-recurrent frontal sinusitis  Coricidin for congestion and start Flonase daily  Patient Instructions     Urinary Tract Infection in Women   AMBULATORY CARE:   A urinary tract infection (UTI)  is caused by bacteria that get inside your urinary tract  Most bacteria that enter your urinary tract come out when you urinate  If the bacteria stay in your urinary tract, you may get an infection  Your urinary tract includes your kidneys, ureters, bladder, and urethra  Urine is made in your kidneys, and it flows from the ureters to the bladder  Urine leaves the bladder through the urethra  A UTI is more common in your lower urinary tract, which includes your bladder and urethra  Common symptoms include the following:   · Urinating more often or waking from sleep to urinate    · Pain or burning when you urinate    · Pain or pressure in your lower abdomen     · Urine that smells bad    · Blood in your urine    · Leaking urine  Seek care immediately if:   · You are urinating very little or not at all  · You have a high fever with shaking chills  · You have side or back pain that gets worse  Contact your healthcare provider if:   · You have a fever  · You do not feel better after 2 days of taking antibiotics  · You are vomiting  · You have questions or concerns about your condition or care  Treatment for a UTI  may include medicines to treat a bacterial infection  You may also need medicines to decrease pain and burning, or decrease the urge to urinate often    Prevent a UTI:   · Empty your bladder often  Urinate and empty your bladder as soon as you feel the need  Do not hold your urine for long periods of time  · Wipe from front to back after you urinate or have a bowel movement  This will help prevent germs from getting into your urinary tract through your urethra  · Drink liquids as directed  Ask how much liquid to drink each day and which liquids are best for you  You may need to drink more liquids than usual to help flush out the bacteria  Do not drink alcohol, caffeine, or citrus juices  These can irritate your bladder and increase your symptoms  Your healthcare provider may recommend cranberry juice to help prevent a UTI  · Urinate after you have sex  This can help flush out bacteria passed during sex  · Do not douche or use feminine deodorants  These can change the chemical balance in your vagina  · Change sanitary pads or tampons often  This will help prevent germs from getting into your urinary tract  · Do pelvic muscle exercises often  Pelvic muscle exercises may help you start and stop urinating  Strong pelvic muscles may help you empty your bladder easier  Squeeze these muscles tightly for 5 seconds like you are trying to hold back urine  Then relax for 5 seconds  Gradually work up to squeezing for 10 seconds  Do 3 sets of 15 repetitions a day, or as directed  Follow up with your healthcare provider as directed:  Write down your questions so you remember to ask them during your visits  © 2017 [employer] Information is for End User's use only and may not be sold, redistributed or otherwise used for commercial purposes  All illustrations and images included in CareNotes® are the copyrighted property of A D A M [employer] Inc  or [employer]  The above information is an  only  It is not intended as medical advice for individual conditions or treatments   Talk to your doctor, nurse or pharmacist before following any medical regimen to see if it is safe and effective for you  No Follow-up on file  Subjective:      Patient ID: Yessica Astudillo is a 38 y o  female  Chief Complaint   Patient presents with    Nausea     Started last pm    Back Pain       A week ago she thought she was getting a UTI, but symptoms resolved  For the past few days she has been experiencing nausea and now has flank pain  She a dullness in her back  Denies dysuria, hesitancy, or flank pain  Denies fevers or vomiting, but has had chills  She has not taken anything OTC for symptoms  She has also had sinus congestion for the past 2 weeks  Has green nasal discharge, worse first thing in the morning upon awakening  Not taking anything OTC fo this  The following portions of the patient's history were reviewed and updated as appropriate: allergies, current medications, past family history, past medical history, past social history, past surgical history and problem list     Review of Systems   Constitutional: Positive for chills  Negative for fatigue and fever  HENT: Positive for congestion and sinus pressure  Respiratory: Negative for cough and shortness of breath  Cardiovascular: Negative for chest pain  Gastrointestinal: Positive for nausea  Negative for abdominal pain, diarrhea and vomiting  Genitourinary: Positive for flank pain  Negative for dysuria, frequency, hematuria, pelvic pain, urgency and vaginal discharge  Musculoskeletal: Negative for arthralgias and back pain  Neurological: Positive for headaches  Current Outpatient Prescriptions   Medication Sig Dispense Refill    albuterol (PROVENTIL HFA,VENTOLIN HFA) 90 mcg/act inhaler Inhale 2 puffs every 4 (four) hours as needed for wheezing Indications: Asthma   1 Inhaler 0    Amphet-Dextroamphet 3-Bead ER (MYDAYIS) 50 MG CP24 Take by mouth daily      aspirin (ECOTRIN LOW STRENGTH) 81 mg EC tablet Take 1 tablet (81 mg total) by mouth daily 1 tablet 0    atorvastatin (LIPITOR) 20 mg tablet Take 1 tablet (20 mg total) by mouth daily for 180 days 90 tablet 1    buPROPion (FORFIVO XL) 450 MG 24 hr tablet Take 1 tablet (450 mg total) by mouth daily Indications: Major Depressive Disorder  At [address] [place] [address] (Patient taking differently: Take 300 mg by mouth daily At 9AM ) 30 tablet 1    cloNIDine (CATAPRES) 0 1 mg tablet Take by mouth daily        desvenlafaxine (PRISTIQ) 100 mg 24 hr tablet Take 1 tablet (100 mg total) by mouth daily Indications: Major Depressive Disorder  At 9AM 30 tablet 1    fluticasone (FLONASE) 50 mcg/act nasal spray 1 spray into each nostril daily as needed for rhinitis Indications: Hayfever  16 g 0    gabapentin (NEURONTIN) 300 mg capsule Take 3 capsules by mouth      hydrOXYzine HCL (ATARAX) 50 mg tablet Take 2 tablets by mouth daily        metFORMIN (GLUCOPHAGE) 1000 MG tablet Take 1 tablet (1,000 mg total) by mouth 2 (two) times a day with meals for 180 days 180 tablet 1    metoprolol succinate (TOPROL-XL) 50 mg 24 hr tablet Take 1 tablet (50 mg total) by mouth daily Indications: High Blood Pressure  At 9AM 30 tablet 1    Multiple Vitamin (MULTIVITAMINS PO) Take by mouth daily      norethindrone (MICRONOR) 0 35 MG tablet Take 1 tablet (0 35 mg total) by mouth daily at bedtime Indications: Birth Control  28 tablet 0    ramipril (ALTACE) 1 25 mg capsule Take 1 capsule (1 25 mg total) by mouth daily for 180 days 90 capsule 1    zaleplon (SONATA) 10 MG capsule Take 1 capsule (10 mg total) by mouth daily at bedtime Indications: Trouble Sleeping  10 capsule 0    ciprofloxacin (CIPRO) 500 mg tablet Take 1 tablet (500 mg total) by mouth every 12 (twelve) hours for 7 days 14 tablet 0    ondansetron (ZOFRAN) 8 mg tablet Take 1 tablet (8 mg total) by mouth every 8 (eight) hours as needed for nausea or vomiting 20 tablet 0     No current facility-administered medications for this visit          Objective:    /84   Pulse 88   Temp 98 6 °F (37 °C) Resp 20   Ht 4' 11" (1 499 m)   Wt 69 4 kg (153 lb)   BMI 30 90 kg/m²        Physical Exam   Constitutional: She appears well-developed and well-nourished  HENT:   Right Ear: Tympanic membrane and ear canal normal    Left Ear: Tympanic membrane and ear canal normal    Nose: Mucosal edema and rhinorrhea (mucoid) present  Cardiovascular: Normal rate, regular rhythm, S1 normal and S2 normal     Pulmonary/Chest: Effort normal and breath sounds normal    Abdominal: Bowel sounds are normal  She exhibits no distension  There is no hepatosplenomegaly  There is no tenderness  There is no CVA tenderness  Skin: Skin is warm, dry and intact  Psychiatric: She has a normal mood and affect                Kris Wilson

## 2018-02-27 NOTE — PATIENT INSTRUCTIONS
Urinary Tract Infection in Women   AMBULATORY CARE:   A urinary tract infection (UTI)  is caused by bacteria that get inside your urinary tract  Most bacteria that enter your urinary tract come out when you urinate  If the bacteria stay in your urinary tract, you may get an infection  Your urinary tract includes your kidneys, ureters, bladder, and urethra  Urine is made in your kidneys, and it flows from the ureters to the bladder  Urine leaves the bladder through the urethra  A UTI is more common in your lower urinary tract, which includes your bladder and urethra  Common symptoms include the following:   · Urinating more often or waking from sleep to urinate    · Pain or burning when you urinate    · Pain or pressure in your lower abdomen     · Urine that smells bad    · Blood in your urine    · Leaking urine  Seek care immediately if:   · You are urinating very little or not at all  · You have a high fever with shaking chills  · You have side or back pain that gets worse  Contact your healthcare provider if:   · You have a fever  · You do not feel better after 2 days of taking antibiotics  · You are vomiting  · You have questions or concerns about your condition or care  Treatment for a UTI  may include medicines to treat a bacterial infection  You may also need medicines to decrease pain and burning, or decrease the urge to urinate often  Prevent a UTI:   · Empty your bladder often  Urinate and empty your bladder as soon as you feel the need  Do not hold your urine for long periods of time  · Wipe from front to back after you urinate or have a bowel movement  This will help prevent germs from getting into your urinary tract through your urethra  · Drink liquids as directed  Ask how much liquid to drink each day and which liquids are best for you  You may need to drink more liquids than usual to help flush out the bacteria  Do not drink alcohol, caffeine, or citrus juices  These can irritate your bladder and increase your symptoms  Your healthcare provider may recommend cranberry juice to help prevent a UTI  · Urinate after you have sex  This can help flush out bacteria passed during sex  · Do not douche or use feminine deodorants  These can change the chemical balance in your vagina  · Change sanitary pads or tampons often  This will help prevent germs from getting into your urinary tract  · Do pelvic muscle exercises often  Pelvic muscle exercises may help you start and stop urinating  Strong pelvic muscles may help you empty your bladder easier  Squeeze these muscles tightly for 5 seconds like you are trying to hold back urine  Then relax for 5 seconds  Gradually work up to squeezing for 10 seconds  Do 3 sets of 15 repetitions a day, or as directed  Follow up with your healthcare provider as directed:  Write down your questions so you remember to ask them during your visits  © 2017 [facility] Information is for End User's use only and may not be sold, redistributed or otherwise used for commercial purposes  All illustrations and images included in CareNotes® are the copyrighted property of A D A [facility]  or [employer]smar Norris  The above information is an  only  It is not intended as medical advice for individual conditions or treatments  Talk to your doctor, nurse or pharmacist before following any medical regimen to see if it is safe and effective for you

## 2018-03-01 ENCOUNTER — TELEPHONE (OUTPATIENT)
Dept: FAMILY MEDICINE CLINIC | Facility: CLINIC | Age: 43
End: 2018-03-01
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
## 2018-03-07 NOTE — PSYCH
History of Present Illness    Presenting Problems: Stressors: PATIENT IS STRESSED ABOUT RETURNING TO WORK FEELING GUILTY OF NOT SEEING CHILDREN WHO SHE GAVE UP USTODY TO HER EX PATIENT HAS BEEN ISOLATING  Referral Source: DR Dat Jernigan  She is employed  at [employer]  She is not a smoker  Symptoms: suicidal ideation, no self abusive behaviors, no homicidal thoughts, no history of violence toward others, depressed mood, anxiety, no psychosis, no medication noncompliance, sleep disturbances, no change in appetite, no agitation, no hypomania and HOPELESSNESSS HELPLESSNESS  Provisional Diagnosis: Axis I: DEPRESSION WITH ANXIETY and Axis II: ADHD FORTINO  Substance Abuse: No substance abuse  Psychiatric Treatment History: INNOVATION 5/2016, Current Psychiatrist: DR Dat Jernigan and Therapist: Harish Holloway   The patient does not require ambulatory assistance  Legal Issues: The patient does not have legal issues  ACCEPTED  Appointment Date: 08/31/2016        Signatures   Electronically signed by : Eugene Blue, ; Aug 26 2016 11:02AM EST                       (Author)

## 2018-06-19 ENCOUNTER — OFFICE VISIT (OUTPATIENT)
Dept: FAMILY MEDICINE CLINIC | Facility: CLINIC | Age: 43
End: 2018-06-19
Payer: COMMERCIAL

## 2018-06-19 VITALS
HEART RATE: 80 BPM | SYSTOLIC BLOOD PRESSURE: 114 MMHG | BODY MASS INDEX: 31.25 KG/M2 | RESPIRATION RATE: 16 BRPM | TEMPERATURE: 97.7 F | DIASTOLIC BLOOD PRESSURE: 80 MMHG | WEIGHT: 155 LBS | HEIGHT: 59 IN

## 2018-06-19 DIAGNOSIS — E11.9 TYPE 2 DIABETES MELLITUS WITHOUT COMPLICATION, WITHOUT LONG-TERM CURRENT USE OF INSULIN (HCC): ICD-10-CM

## 2018-06-19 DIAGNOSIS — N39.0 ACUTE UTI: Primary | ICD-10-CM

## 2018-06-19 DIAGNOSIS — I10 BENIGN ESSENTIAL HYPERTENSION: ICD-10-CM

## 2018-06-19 LAB
SL AMB  POCT GLUCOSE, UA: 1000
SL AMB LEUKOCYTE ESTERASE,UA: 75
SL AMB POCT BILIRUBIN,UA: 1
SL AMB POCT BLOOD,UA: 250
SL AMB POCT CLARITY,UA: NORMAL
SL AMB POCT COLOR,UA: NORMAL
SL AMB POCT KETONES,UA: 15
SL AMB POCT NITRITE,UA: NORMAL
SL AMB POCT PH,UA: 5
SL AMB POCT SPECIFIC GRAVITY,UA: 1.02
SL AMB POCT URINE PROTEIN: 30
SL AMB POCT UROBILINOGEN: NORMAL

## 2018-06-19 PROCEDURE — 81003 URINALYSIS AUTO W/O SCOPE: CPT | Performed by: NURSE PRACTITIONER

## 2018-06-19 PROCEDURE — 99214 OFFICE O/P EST MOD 30 MIN: CPT | Performed by: NURSE PRACTITIONER

## 2018-06-19 RX ORDER — CIPROFLOXACIN 250 MG/1
250 TABLET, FILM COATED ORAL EVERY 12 HOURS SCHEDULED
Qty: 6 TABLET | Refills: 0 | Status: SHIPPED | OUTPATIENT
Start: 2018-06-19 | End: 2018-06-22

## 2018-06-19 RX ORDER — BUPROPION HYDROCHLORIDE 300 MG/1
300 TABLET ORAL EVERY MORNING
COMMUNITY
Start: 2018-05-25

## 2018-06-19 RX ORDER — PRAZOSIN HYDROCHLORIDE 1 MG/1
CAPSULE ORAL
COMMUNITY
Start: 2018-05-09 | End: 2019-04-30 | Stop reason: ALTCHOICE

## 2018-06-19 RX ORDER — CARIPRAZINE 1.5 MG/1
1.5 CAPSULE, GELATIN COATED ORAL DAILY
COMMUNITY
Start: 2018-05-25 | End: 2021-12-17

## 2018-06-19 NOTE — PATIENT INSTRUCTIONS
Take antibiotics until finished with food  Drink plenty of fluids  Follow up advised for persistent urinary symptoms or if you develop flank pain, fevers, nausea, or vomiting  Please call the office if symptoms do not improve after completion of the antibiotics  Repeat HbA1c and see pcp   Supportive care discussed and advised  Follow up for no improvement and worsening of conditions  Patient advised and educated when to see immediate medical care  Urinary Tract Infection in Women   AMBULATORY CARE:   A urinary tract infection (UTI)  is caused by bacteria that get inside your urinary tract  Most bacteria that enter your urinary tract come out when you urinate  If the bacteria stay in your urinary tract, you may get an infection  Your urinary tract includes your kidneys, ureters, bladder, and urethra  Urine is made in your kidneys, and it flows from the ureters to the bladder  Urine leaves the bladder through the urethra  A UTI is more common in your lower urinary tract, which includes your bladder and urethra  Common symptoms include the following:   · Urinating more often or waking from sleep to urinate    · Pain or burning when you urinate    · Pain or pressure in your lower abdomen     · Urine that smells bad    · Blood in your urine    · Leaking urine  Seek care immediately if:   · You are urinating very little or not at all  · You have a high fever with shaking chills  · You have side or back pain that gets worse  Contact your healthcare provider if:   · You have a fever  · You do not feel better after 2 days of taking antibiotics  · You are vomiting  · You have questions or concerns about your condition or care  Treatment for a UTI  may include medicines to treat a bacterial infection  You may also need medicines to decrease pain and burning, or decrease the urge to urinate often  Prevent a UTI:   · Empty your bladder often    Urinate and empty your bladder as soon as you feel the need  Do not hold your urine for long periods of time  · Wipe from front to back after you urinate or have a bowel movement  This will help prevent germs from getting into your urinary tract through your urethra  · Drink liquids as directed  Ask how much liquid to drink each day and which liquids are best for you  You may need to drink more liquids than usual to help flush out the bacteria  Do not drink alcohol, caffeine, or citrus juices  These can irritate your bladder and increase your symptoms  Your healthcare provider may recommend cranberry juice to help prevent a UTI  · Urinate after you have sex  This can help flush out bacteria passed during sex  · Do not douche or use feminine deodorants  These can change the chemical balance in your vagina  · Change sanitary pads or tampons often  This will help prevent germs from getting into your urinary tract  · Do pelvic muscle exercises often  Pelvic muscle exercises may help you start and stop urinating  Strong pelvic muscles may help you empty your bladder easier  Squeeze these muscles tightly for 5 seconds like you are trying to hold back urine  Then relax for 5 seconds  Gradually work up to squeezing for 10 seconds  Do 3 sets of 15 repetitions a day, or as directed  Follow up with your healthcare provider as directed:  Write down your questions so you remember to ask them during your visits  © 2017 [employer] Information is for End User's use only and may not be sold, redistributed or otherwise used for commercial purposes  All illustrations and images included in CareNotes® are the copyrighted property of A D A M , Inc  or [employer]smar Norris  The above information is an  only  It is not intended as medical advice for individual conditions or treatments  Talk to your doctor, nurse or pharmacist before following any medical regimen to see if it is safe and effective for you

## 2018-06-19 NOTE — PROGRESS NOTES
Assessment/Plan:  Take antibiotics until finished with food  Drink plenty of fluids  Follow up advised for persistent urinary symptoms or if you develop flank pain, fevers, nausea, or vomiting  Please call the office if symptoms do not improve after completion of the antibiotics  Repeat HbA1c and see pcp   Supportive care discussed and advised  Follow up for no improvement and worsening of conditions  Patient advised and educated when to see immediate medical care  Diagnoses and all orders for this visit:    Acute UTI  -     ciprofloxacin (CIPRO) 250 mg tablet; Take 1 tablet (250 mg total) by mouth every 12 (twelve) hours for 3 days  -     POCT urine dip auto non-scope  -     Urine culture    Type 2 diabetes mellitus without complication, without long-term current use of insulin (HCC)  Comments:  Glucose >1000 noted in urine with ketones and will do HbA1c and will follow up back in office  On ACE  Orders:  -     POCT urine dip auto non-scope  -     Urine culture    Benign essential hypertension  Comments:  Stable with ACE and continue same regimen    BMI 31 0-31 9,adult    Other orders  -     buPROPion (WELLBUTRIN XL) 300 mg 24 hr tablet;   -     VRAYLAR 1 5 MG capsule;   -     prazosin (MINIPRESS) 1 mg capsule;           Return in about 1 week (around 6/26/2018), or if symptoms worsen or fail to improve  Subjective:      Patient ID: Judge Harper is a 38 y o  female  Chief Complaint   Patient presents with    UTI     Started two days ago, discomfort, blood in urine  drhlpn       HPI   Patient started with lower abdomen discomfort yesterday and stated that noticed blood in urine today  Denies fever, chills, n/v/d and flank pain  On metformin BID and tolerating it well and supposed to do HbA1c and have not done yet       The following portions of the patient's history were reviewed and updated as appropriate: allergies, current medications, past family history, past medical history, past social history, past surgical history and problem list       Review of Systems   Constitutional: Negative  Negative for chills, fatigue, fever and unexpected weight change  HENT: Negative  Respiratory: Negative  Cardiovascular: Negative  Gastrointestinal: Negative for abdominal pain, nausea and vomiting  As noted in HPI   Endocrine: Negative  Genitourinary: Negative for decreased urine volume, difficulty urinating, dysuria, flank pain, frequency, genital sores, hematuria and urgency  Musculoskeletal: Negative  Skin: Negative  Neurological: Negative for dizziness and headaches  Psychiatric/Behavioral: Negative  Objective:    History   Smoking Status    Never Smoker   Smokeless Tobacco    Never Used       Allergies: Allergies   Allergen Reactions    Oxycodone-Acetaminophen Nausea Only and Vomiting     Reaction Date: 13Dec2006;        Vitals:  /80   Pulse 80   Temp 97 7 °F (36 5 °C)   Resp 16   Ht 4' 11" (1 499 m)   Wt 70 3 kg (155 lb)   LMP 02/14/2017   BMI 31 31 kg/m²     Current Outpatient Prescriptions   Medication Sig Dispense Refill    albuterol (PROVENTIL HFA,VENTOLIN HFA) 90 mcg/act inhaler Inhale 2 puffs every 4 (four) hours as needed for wheezing Indications: Asthma  1 Inhaler 0    Amphet-Dextroamphet 3-Bead ER (MYDAYIS) 50 MG CP24 Take by mouth daily      aspirin (ECOTRIN LOW STRENGTH) 81 mg EC tablet Take 1 tablet (81 mg total) by mouth daily 1 tablet 0    atorvastatin (LIPITOR) 20 mg tablet Take 1 tablet (20 mg total) by mouth daily for 180 days 90 tablet 1    buPROPion (WELLBUTRIN XL) 300 mg 24 hr tablet       cloNIDine (CATAPRES) 0 1 mg tablet Take 1 tablet by mouth daily       desvenlafaxine (PRISTIQ) 100 mg 24 hr tablet Take 1 tablet (100 mg total) by mouth daily Indications: Major Depressive Disorder   At 9AM 30 tablet 1    fluticasone (FLONASE) 50 mcg/act nasal spray 1 spray into each nostril daily as needed for rhinitis Indications: Hayfever  16 g 0    gabapentin (NEURONTIN) 300 mg capsule Take 3 capsules by mouth      hydrOXYzine HCL (ATARAX) 50 mg tablet Take 2 tablets by mouth daily        metFORMIN (GLUCOPHAGE) 1000 MG tablet Take 1 tablet (1,000 mg total) by mouth 2 (two) times a day with meals for 180 days 180 tablet 1    metoprolol succinate (TOPROL-XL) 50 mg 24 hr tablet Take 1 tablet (50 mg total) by mouth daily Indications: High Blood Pressure  At 9AM 30 tablet 1    Multiple Vitamin (MULTIVITAMINS PO) Take by mouth daily      norethindrone (MICRONOR) 0 35 MG tablet Take 1 tablet (0 35 mg total) by mouth daily at bedtime Indications: Birth Control  28 tablet 0    prazosin (MINIPRESS) 1 mg capsule       ramipril (ALTACE) 1 25 mg capsule Take 1 capsule (1 25 mg total) by mouth daily for 180 days 90 capsule 1    VRAYLAR 1 5 MG capsule       ciprofloxacin (CIPRO) 250 mg tablet Take 1 tablet (250 mg total) by mouth every 12 (twelve) hours for 3 days 6 tablet 0    ondansetron (ZOFRAN) 8 mg tablet Take 1 tablet (8 mg total) by mouth every 8 (eight) hours as needed for nausea or vomiting 20 tablet 0     No current facility-administered medications for this visit  Physical Exam   Constitutional: She is oriented to person, place, and time  She appears well-developed and well-nourished  HENT:   Head: Normocephalic  Right Ear: External ear normal    Left Ear: External ear normal    Nose: Nose normal    Mouth/Throat: Oropharynx is clear and moist    Eyes: Conjunctivae are normal  Pupils are equal, round, and reactive to light  Neck: Normal range of motion  Neck supple  Cardiovascular: Normal rate, regular rhythm, S1 normal, S2 normal and normal heart sounds  No murmur heard  Pulmonary/Chest: Effort normal and breath sounds normal    Abdominal: Soft  Normal appearance and bowel sounds are normal  There is no tenderness  There is no CVA tenderness  Musculoskeletal: Normal range of motion   She exhibits no edema, tenderness or deformity  Neurological: She is alert and oriented to person, place, and time  Skin: Skin is warm, dry and intact  Psychiatric: She has a normal mood and affect  Her speech is normal and behavior is normal  Judgment and thought content normal    Vitals reviewed          EBENEZER Galo

## 2018-06-22 LAB
BACTERIA UR CULT: ABNORMAL
Lab: ABNORMAL
SL AMB ANTIMICROBIAL SUSCEPTIBILITY: ABNORMAL

## 2018-06-28 ENCOUNTER — TELEPHONE (OUTPATIENT)
Dept: FAMILY MEDICINE CLINIC | Facility: CLINIC | Age: 43
End: 2018-06-28

## 2018-06-28 DIAGNOSIS — E11.9 TYPE 2 DIABETES MELLITUS WITHOUT COMPLICATION, WITHOUT LONG-TERM CURRENT USE OF INSULIN (HCC): Primary | ICD-10-CM

## 2018-06-28 LAB
ALBUMIN/CREAT UR: 9 MG/G CREAT (ref 0–30)
CREAT UR-MCNC: 170.2 MG/DL
HBA1C MFR BLD: 9.6 % (ref 4.8–5.6)
MICROALBUMIN UR-MCNC: 15.3 UG/ML

## 2018-06-28 NOTE — TELEPHONE ENCOUNTER
PT NEEDS AN ORDER FOR HER A1C, SHE SAID SHE THOUGHT YOU GAVE HER ONE AT HER LAST VISIT BUT I DIDN'T SEE IT IN HER CHART JUST PAST TESTS THAT HAVE ALREADY BEEN DONE, PLEASE CREATE ANOTHER ORDER PT IS HERE WAITING

## 2018-07-12 ENCOUNTER — OFFICE VISIT (OUTPATIENT)
Dept: FAMILY MEDICINE CLINIC | Facility: CLINIC | Age: 43
End: 2018-07-12
Payer: COMMERCIAL

## 2018-07-12 VITALS
HEART RATE: 78 BPM | SYSTOLIC BLOOD PRESSURE: 110 MMHG | TEMPERATURE: 97 F | BODY MASS INDEX: 30.84 KG/M2 | WEIGHT: 153 LBS | RESPIRATION RATE: 16 BRPM | HEIGHT: 59 IN | DIASTOLIC BLOOD PRESSURE: 68 MMHG

## 2018-07-12 DIAGNOSIS — I10 BENIGN ESSENTIAL HYPERTENSION: ICD-10-CM

## 2018-07-12 DIAGNOSIS — E11.9 TYPE 2 DIABETES MELLITUS WITHOUT COMPLICATION, WITHOUT LONG-TERM CURRENT USE OF INSULIN (HCC): ICD-10-CM

## 2018-07-12 DIAGNOSIS — E78.2 MIXED HYPERLIPIDEMIA: Primary | ICD-10-CM

## 2018-07-12 PROCEDURE — 99214 OFFICE O/P EST MOD 30 MIN: CPT | Performed by: FAMILY MEDICINE

## 2018-07-12 PROCEDURE — 3074F SYST BP LT 130 MM HG: CPT | Performed by: FAMILY MEDICINE

## 2018-07-12 PROCEDURE — 3008F BODY MASS INDEX DOCD: CPT | Performed by: FAMILY MEDICINE

## 2018-07-12 PROCEDURE — 3078F DIAST BP <80 MM HG: CPT | Performed by: FAMILY MEDICINE

## 2018-07-12 PROCEDURE — 1036F TOBACCO NON-USER: CPT | Performed by: FAMILY MEDICINE

## 2018-07-12 NOTE — PROGRESS NOTES
Pt is here for a three month follow up to [facility] for diabetes  Assessment/Plan:    Problem List Items Addressed This Visit     Benign essential hypertension    Type 2 diabetes mellitus without complication, without long-term current use of insulin (HCC)    Relevant Medications    sitaGLIPtin-metFORMIN (JANUMET)  MG per tablet    Other Relevant Orders    Hemoglobin A1C    Mixed hyperlipidemia - Primary    Relevant Orders    Lipid Panel with Direct LDL reflex    Comprehensive metabolic panel          There are no Patient Instructions on file for this visit  Return in about 3 months (around 10/12/2018) for Recheck  Subjective:      Patient ID: Phil Arteaga is a 38 y o  female  Chief Complaint   Patient presents with    Follow-up     review [facility]       Pt is here for a three month follow up to [facility] for diabetes  Pt denies chest pain no sob  Does have some complaints of excessive thirst not so much polyuria    Pt has the order for her mammo    Pt states she did notice one time when she laid down on her chest she felt a litle flutter - lasted for a few seconds  The following portions of the patient's history were reviewed and updated as appropriate: allergies, current medications, past family history, past medical history, past social history, past surgical history and problem list     Review of Systems   Constitutional: Negative  Negative for activity change, appetite change, chills, diaphoresis and fatigue  HENT: Negative  Negative for dental problem, ear pain, sinus pressure and sore throat  Eyes: Negative  Negative for photophobia, pain, discharge, redness, itching and visual disturbance  Respiratory: Negative for apnea and chest tightness  Cardiovascular: Negative  Negative for chest pain, palpitations and leg swelling  Flutter one time   Gastrointestinal: Negative  Negative for abdominal distention, abdominal pain, constipation and diarrhea  Endocrine: Negative  Negative for cold intolerance and heat intolerance  Genitourinary: Negative  Negative for difficulty urinating and dyspareunia  Musculoskeletal: Negative  Negative for arthralgias and back pain  Skin: Negative  Allergic/Immunologic: Negative for environmental allergies  Neurological: Negative  Negative for dizziness  Psychiatric/Behavioral: Negative  Negative for agitation  Current Outpatient Prescriptions   Medication Sig Dispense Refill    albuterol (PROVENTIL HFA,VENTOLIN HFA) 90 mcg/act inhaler Inhale 2 puffs every 4 (four) hours as needed for wheezing Indications: Asthma  1 Inhaler 0    Amphet-Dextroamphet 3-Bead ER (MYDAYIS) 50 MG CP24 Take by mouth daily      aspirin (ECOTRIN LOW STRENGTH) 81 mg EC tablet Take 1 tablet (81 mg total) by mouth daily 1 tablet 0    atorvastatin (LIPITOR) 20 mg tablet Take 1 tablet (20 mg total) by mouth daily for 180 days 90 tablet 1    buPROPion (WELLBUTRIN XL) 300 mg 24 hr tablet       cloNIDine (CATAPRES) 0 1 mg tablet Take 1 tablet by mouth daily       desvenlafaxine (PRISTIQ) 100 mg 24 hr tablet Take 1 tablet (100 mg total) by mouth daily Indications: Major Depressive Disorder  At 9AM 30 tablet 1    fluticasone (FLONASE) 50 mcg/act nasal spray 1 spray into each nostril daily as needed for rhinitis Indications: Hayfever  16 g 0    gabapentin (NEURONTIN) 300 mg capsule Take 3 capsules by mouth      hydrOXYzine HCL (ATARAX) 50 mg tablet Take 2 tablets by mouth daily        metoprolol succinate (TOPROL-XL) 50 mg 24 hr tablet Take 1 tablet (50 mg total) by mouth daily Indications: High Blood Pressure  At 9AM 30 tablet 1    Multiple Vitamin (MULTIVITAMINS PO) Take by mouth daily      norethindrone (MICRONOR) 0 35 MG tablet Take 1 tablet (0 35 mg total) by mouth daily at bedtime Indications: Birth Control   28 tablet 0    ondansetron (ZOFRAN) 8 mg tablet Take 1 tablet (8 mg total) by mouth every 8 (eight) hours as needed for nausea or vomiting 20 tablet 0    Probiotic Product (PROBIOTIC DAILY PO) Take by mouth      ramipril (ALTACE) 1 25 mg capsule Take 1 capsule (1 25 mg total) by mouth daily for 180 days 90 capsule 1    VRAYLAR 1 5 MG capsule       prazosin (MINIPRESS) 1 mg capsule       sitaGLIPtin-metFORMIN (JANUMET)  MG per tablet Take 1 tablet by mouth 2 (two) times a day with meals 180 tablet 1     No current facility-administered medications for this visit  Objective:    /68   Pulse 78   Temp (!) 97 °F (36 1 °C)   Resp 16   Ht 4' 11" (1 499 m)   Wt 69 4 kg (153 lb)   LMP 02/14/2017   BMI 30 90 kg/m²        Physical Exam   Constitutional: She appears well-developed and well-nourished  No distress  HENT:   Head: Normocephalic and atraumatic  Right Ear: External ear normal    Left Ear: External ear normal    Nose: Nose normal    Mouth/Throat: Oropharynx is clear and moist  No oropharyngeal exudate  Eyes: EOM are normal  Pupils are equal, round, and reactive to light  Right eye exhibits no discharge  Left eye exhibits no discharge  No scleral icterus  Neck: No thyromegaly present  Cardiovascular: Normal rate and normal heart sounds  No murmur heard  Pulmonary/Chest: Effort normal and breath sounds normal  No respiratory distress  She has no wheezes  Abdominal: Soft  Bowel sounds are normal  She exhibits no distension and no mass  There is no tenderness  There is no rebound and no guarding  Musculoskeletal: Normal range of motion  Neurological: She is alert  She displays normal reflexes  Coordination normal    Skin: Skin is warm and dry  No rash noted  She is not diaphoretic  No erythema  Psychiatric: She has a normal mood and affect  Her behavior is normal    Nursing note and vitals reviewed             Recent Results (from the past 672 hour(s))   POCT urine dip auto non-scope    Collection Time: 06/19/18  2:54 PM   Result Value Ref Range     COLOR,UA ambar      CLARITY,UA cloudy      SPECIFIC GRAVITY,UA 1 020      PH,UA 5     LEUKOCYTE ESTERASE,UA 75      NITRITE,UA neg     GLUCOSE, UA 1,000      KETONES,UA 15      BILIRUBIN,UA 1      BLOOD,     SL AMB POCT URINE PROTEIN 30     SL AMB POCT UROBILINOGEN norm    Urine culture    Collection Time: 06/19/18  2:55 PM   Result Value Ref Range    SL AMB LAB URINE CULTURE RESULT Final report (A)    Result    Collection Time: 06/19/18  2:55 PM   Result Value Ref Range    Result 1 Escherichia coli (A)     SL AMB ANTIMICROBIAL SUSCEPTIBILITY Comment    Microalbumin / creatinine urine ratio    Collection Time: 06/28/18 10:30 AM   Result Value Ref Range    Creatinine, Urine 170 2 Not Estab  mg/dL    Microalbum  ,U,Random 15 3 Not Estab  ug/mL    Microalb/Creat Ratio 9 0 0 0 - 30 0 mg/g creat   Hemoglobin A1c (w/out EAG)    Collection Time: 06/28/18 10:30 AM   Result Value Ref Range    Hemoglobin A1C 9 6 (H) 4 8 - 5 6 %         Porsha Bowen DO

## 2018-07-23 DIAGNOSIS — I10 HYPERTENSION, UNSPECIFIED TYPE: ICD-10-CM

## 2018-07-23 RX ORDER — METOPROLOL SUCCINATE 50 MG/1
50 TABLET, EXTENDED RELEASE ORAL DAILY
Qty: 90 TABLET | Refills: 1 | Status: SHIPPED | OUTPATIENT
Start: 2018-07-23 | End: 2018-07-24 | Stop reason: SDUPTHER

## 2018-07-24 DIAGNOSIS — I10 HYPERTENSION, UNSPECIFIED TYPE: ICD-10-CM

## 2018-07-24 RX ORDER — METOPROLOL SUCCINATE 50 MG/1
50 TABLET, EXTENDED RELEASE ORAL DAILY
Qty: 90 TABLET | Refills: 1 | Status: SHIPPED | OUTPATIENT
Start: 2018-07-24 | End: 2019-02-08 | Stop reason: SDUPTHER

## 2018-08-03 DIAGNOSIS — E11.9 TYPE 2 DIABETES MELLITUS WITHOUT COMPLICATION, WITHOUT LONG-TERM CURRENT USE OF INSULIN (HCC): ICD-10-CM

## 2018-08-03 DIAGNOSIS — I10 BENIGN ESSENTIAL HYPERTENSION: ICD-10-CM

## 2018-08-06 PROCEDURE — 4010F ACE/ARB THERAPY RXD/TAKEN: CPT | Performed by: FAMILY MEDICINE

## 2018-08-06 RX ORDER — RAMIPRIL 1.25 MG/1
1.25 CAPSULE ORAL DAILY
Qty: 90 CAPSULE | Refills: 1 | Status: SHIPPED | OUTPATIENT
Start: 2018-08-06 | End: 2019-04-15 | Stop reason: SDUPTHER

## 2018-08-07 DIAGNOSIS — E11.9 TYPE 2 DIABETES MELLITUS WITHOUT COMPLICATION, WITHOUT LONG-TERM CURRENT USE OF INSULIN (HCC): ICD-10-CM

## 2018-08-07 DIAGNOSIS — E78.2 MIXED HYPERLIPIDEMIA: ICD-10-CM

## 2018-08-07 RX ORDER — ATORVASTATIN CALCIUM 20 MG/1
20 TABLET, FILM COATED ORAL DAILY
Qty: 90 TABLET | Refills: 1 | Status: SHIPPED | OUTPATIENT
Start: 2018-08-07 | End: 2019-04-15 | Stop reason: SDUPTHER

## 2018-08-08 DIAGNOSIS — E11.9 TYPE 2 DIABETES MELLITUS WITHOUT COMPLICATION, WITHOUT LONG-TERM CURRENT USE OF INSULIN (HCC): ICD-10-CM

## 2018-08-27 DIAGNOSIS — E11.9 TYPE 2 DIABETES MELLITUS WITHOUT COMPLICATION, WITHOUT LONG-TERM CURRENT USE OF INSULIN (HCC): ICD-10-CM

## 2018-08-27 NOTE — TELEPHONE ENCOUNTER
Fax refill request received from pharmacy for metformin hcl, on patients medication list is sitaGLIPtin-metFORMIN   L/m for patient requesting a call back Essence Guerra MA

## 2018-08-27 NOTE — TELEPHONE ENCOUNTER
Patient informed will go to pharmacy and see which was is cheaper     No further actions required Althea Vilchis LPN

## 2018-08-27 NOTE — TELEPHONE ENCOUNTER
Spoke to pt verified pt is taking Metformin HCL 1000 mg      states Janumet is to expensive never  Picked up script  Asked pt if she could call  Her insurance and seee what they will cover  Pt states she doesn't know  asked if you can just send a script  For metformin and a script for sitagliptin and see if that is cheaper for pt

## 2018-08-28 DIAGNOSIS — E11.9 TYPE 2 DIABETES MELLITUS WITHOUT COMPLICATION, WITHOUT LONG-TERM CURRENT USE OF INSULIN (HCC): ICD-10-CM

## 2018-08-28 NOTE — TELEPHONE ENCOUNTER
Spoke to pt states  She researched and she can get janumet  Through her mail order its cheaper as long as it a 3 month supply  can you please order? af/carlos

## 2018-08-28 NOTE — TELEPHONE ENCOUNTER
June Small does not want medications going to [facility]        She needs the prescription MARIA DEL CARMEN called into to [address]  She said she doesn't want the two separate prescriptions      Thanks

## 2018-08-28 NOTE — TELEPHONE ENCOUNTER
Left message on machine requesting a call back  I need to know if she is going to take [place] [facility] [place] and metformin as separate medications or is she going to go back to [facility]  Yesterday she stated danoumet was to [facility]

## 2019-02-08 DIAGNOSIS — I10 HYPERTENSION, UNSPECIFIED TYPE: ICD-10-CM

## 2019-02-11 RX ORDER — METOPROLOL SUCCINATE 50 MG/1
50 TABLET, EXTENDED RELEASE ORAL DAILY
Qty: 90 TABLET | Refills: 1 | Status: SHIPPED | OUTPATIENT
Start: 2019-02-11 | End: 2019-04-15 | Stop reason: SDUPTHER

## 2019-04-11 NOTE — PSYCH
challenging cognitive distortions and relaxation exercises  Fully engaged in therapist led relaxation exercises offering feedback related to benefits  She expressed struggling with âdisqualifying the positivesâ when learning the different types of cognitive distortions  Group practiced CBT technique to challenge and change thoughts  She identified a personal need to work on the exercise given  Group reinforced practice of skill  Some initial effort noted toward goal  Continue AT to increase awareness and use of wellness tools  Treatment Plan Problem(s): 1 1  Sveta Johnston MT-BC     ( ) Other 1500 Left two messages with [facility] [place], operated by [facility] to complete precertification  Reference number [id number] at 1200 and 1400    6433 NXILL with Prema at [address] - 4 days authorized through 9/6/16 #[id number]  Review with Nava Conway 9/6/16 - [phone]  Sveta Johnston [facility]     Case Management Note:   [id number] Met with Neena to complete evaluation  Reviewed program guidelines and Neena signed releases of information for OP providers and PCP as well as emergency contact  OP therapist notified of admission and health care coordination/med list forwarded to PCP and OP psychiatrist  On call number provided  TREATMENT SESSION NUMBER: 1   Current suicide risk is low  Medications not changed/added/denied  Sveta Johnston [facility]   HPI - Psych: Karrie Ortiz is a 35year-old female with a history of Generalized Anxiety, Major Depression and Attention deficit Disorder came referred by Dr Praveen Olsen due to severe anxiety, increased depression, passive suicidal thoughts, denies any plan or intent  She has poor concentration, feels hopeless, helpless, isolating, feeling guilty of given the custody of her children to the father  She is stress out because she needs to return to work   Today she feels depressed, anxious, denies suicidal thoughts, plan or intent, denies any homicidal thoughts, denies any psychotic symptoms, denies any manic episodes  Her PHQ-9 is 18  Review of Systems  Focused Female Psychiatric ROS [facility]: depression and decreased functioning ability  Constitutional: No fever, no chills, no recent weight gain or recent weight loss  ENT: no ear ache, no loss of hearing, no nosebleeds or nasal discharge, no sore throat or hoarseness  Cardiovascular: no complaints of slow or fast heart rate, no chest pain, no palpitations, no leg claudication or lower extremity edema  Respiratory: no complaints of shortness of breath, no wheezing, no dyspnea on exertion, no orthopnea or PND  Gastrointestinal: no complaints of abdominal pain, no constipation, no nausea or diarrhea, no vomiting, no bloody stools  Genitourinary: no complaints of dysuria, no incontinence, no pelvic pain, no dysmenorrhea, no vaginal discharge or abnormal vaginal bleeding  Musculoskeletal: no complaints of arthralgia, no myalgia, no joint swelling or stiffness, no limb pain or swelling  Integumentary: no complaints of skin rash or lesion, no itching or dry skin, no skin wounds  Neurological: no complaints of headache, no confusion, no numbness or tingling, no dizziness or fainting  Other Symptoms: Endocrine is negative  ROS Reviewed:   ROS reviewed  Past Psychiatric History  [facility]:   Past Psychiatric History: She has a history of Generalized Anxiety, Major Depression and Attention deficit Disorder ; had 2 psychiatric impatient admissions, on her 27's and other at [facility] on 5/9/2016  She was at [facility] on 5/2016  She follows up with Dr Traci Herr and Nicky Spence a therapist  She denies any history of suicidal attempt, denies any history violent behavior  She has been on Effexor , Pristiq, Zoloft, Sonata, Clonidine, Vivance, Wellbutrin and Klonopin  Substance Abuse Hx  Substance Abuse History [facility]:   Substance Abuse History: She denies any alcohol , denies any drugs, or tobacco           Active Problems     1  Allergic rhinitis (477 9) (J30 9)   2  Anxiety and depression (300 00,311) (F41 9,F32 9)   3  Benign essential hypertension (401 1) (I10)   4  BMI 31 0-31 9,adult (V85 31) (Z68 31)   5  Cervicalgia (723 1) (M54 2)   6  Constipation (564 00) (K59 00)   7  Dysfunction of eustachian tube, unspecified laterality (381 81) (H69 80)   8  Encounter for screening mammogram for malignant neoplasm of breast (V76 12)   (Z12 31)   9  Genital herpes simplex (054 10) (A60 00)   10  Headache (784 0) (R51)   11  Hip pain, unspecified laterality   12  Hypokalemia (276 8) (E87 6)   13  Lumbago (724 2) (M54 5)   14  Lyme disease (088 81) (A69 20)   15  Motion sickness (994 6) (T75 3XXA)   16  Obesity, Class I, BMI 30-34 9 (278 00) (E66 9)   17  Other muscle spasm (728 85) (M62 838)   18  Tachycardia (785 0) (R00 0)   19  Thyroid disorder (246 9) (E07 9)   20  Well adult on routine health check (V70 0) (Z00 00)    Severe episode of recurrent major depressive disorder, without psychotic features (296 33) (F33 2)       FORTINO (generalized anxiety disorder) (300 02) (F41 1)       ADHD, predominantly inattentive type (314 00) (F90 0)          Past Medical History    1  History of Acute bronchitis (466 0) (J20 9)   2  History of Acute pharyngitis (462) (J02 9)   3  History of Acute sinusitis (461 9) (J01 90)   4  History of Acute sinusitis (461 9) (J01 90)   5  History of Acute tonsillitis (463) (J03 90)   6  History of Acute upper respiratory infection (465 9) (J06 9)   7  History of Acute upper respiratory infection (465 9) (J06 9)   8  History of Acute UTI (599 0) (N39 0)   9  History of Cervicalgia (723 1) (M54 2)   10  History of acute pharyngitis (V12 69) (Z87 09)   11  History of migraine (V12 49) (Z86 69)   12  History of Nonallopathic lesion (739 9) (M99 9)   13  History of Palpitations (785 1) (R00 2)   14  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)   15  History of Shoulder joint pain, unspecified laterality   16   Urinary tract infection (599 0) (N39 0)  Active Problems And Past Medical History Reviewed: The active problems and past medical history were reviewed and updated today  Surgical History  Surgical History Reviewed: The surgical history was reviewed and updated today  Allergies    1  Percocet TABS    Current Meds   1  Albuterol 90 MCG/ACT AERS; INHALE 1 TO 2 PUFFS EVERY 4 TO 6 HOURS AS   NEEDED; Therapy: (Recorded:47Wfv6320) to Recorded   2  BuPROPion HCl ER (XL) 300 MG Oral Tablet Extended Release 24 Hour; TAKE 1   TABLET Daily thity tablets; Therapy: 66WDO0710 to (Evaluate:23Jun2016)  Requested for: 13IHK1646; Last   Rx:24May2016 Ordered   3  ClonazePAM 1 MG Oral Tablet; TAKE 1 TABLET AT BEDTIME; Therapy: 28CTF7757 to (Evaluate:54Jjx6115); Last Rx:10Jun2016 Ordered   4  Griselda 0 35 MG Oral Tablet; TAKE 1 TABLET DAILY; Therapy: 65YTV6371 to Recorded   5  Flonase 50 MCG/ACT SUSP; USE 1 SPRAY IN EACH NOSTRIL ONCE DAILY; Therapy: (Recorded:20May2016) to Recorded   6  HydrOXYzine HCl - 25 MG Oral Tablet; TAKE 1 TO 2 TABLETS AT BEDTIME; Last   Rx:24May2016 Ordered   7  Metoprolol Succinate ER 50 MG Oral Tablet Extended Release 24 Hour; TAKE 1 TABLET   DAILY; Therapy: 63OIT8806 to (Evaluate:16Oct2016)  Requested for: 31WZP7699; Last   Rx:19Apr2016 Ordered   8  Multivitamins TABS; 1 Every Day; Therapy: 45RAB7505 to  Requested for: 38EVO5515 Recorded   9  Pristiq 100 MG Oral Tablet Extended Release 24 Hour; Take 1 tablet daily; Therapy: 87PNP9591 to  Requested for: 73ODR8970 Recorded   10  Vyvanse 50 MG Oral Capsule; 1 capsule at 2:00pm;    Therapy: 85SBA6475 to  Requested for: 37TDS5487 Recorded   11  Vyvanse 60 MG Oral Capsule; take 1 capsule daily; Therapy: (Recorded:82Lde1424) to Recorded   12  Zaleplon 10 MG Oral Capsule; TAKE 1 TO 2 CAPSULES AT BEDTIME AS NEEDED; Last    ND:63EBC2247 Ordered  Medication List Reviewed: The medication list was reviewed and updated today         Family Psych History  Son    1  Family history of attention deficit hyperactivity disorder (ADHD) (V17 0) (Z81 8)  Sister    2  Family history of Bipolar 1 disorder  Family History    3  Family history of Asbestosis   4  Family history of Cancer   5  Family history of Diabetes Mellitus (V18 0)   6  Family history of Emphysema   7  Family history of Glaucoma   8  Family history of Heart Disease (V17 49)   9  Family history of Hypertension (V17 49)   10  Family history of Mixed Hyperlipoproteinemia  Family History Reviewed: The family history was reviewed and updated today  FH Free Text Note [facility]  [place]: Positive for Bipolar Disorder in her sister and ADHD on her son  Social History    · College graduate   ·    · Employed   · Never A Smoker   · No history of alcohol use  Social History Reviewed: The social history was reviewed and updated today  Social History Free Text Note Gilbert Elizondo: The patient is  , lives alone, , has 3 children ages, 15, 6,9 all are with the father; she is a teacher , Bachelor's degree in Education   No  history no legal issues  History Of Phys/Sex Abuse Or Perpetration  History Of Phys-Sex Abuse [facility] [place]:   History Of Phys/Sex Abuse or Perpetration: She denies any history of physical abuse and sexual abuse  Physical Exam    Appearance: was calm and cooperative, adequate hygiene and grooming and good eye contact  Observed mood: depressed  Observed mood: affect was constricted  Speech: a normal rate  Thought processes: coherent/organized  Hallucinations: no hallucinations present  Thought Content: no delusions  Abnormal Thoughts: The patient has no suicidal thoughts and no homicidal thoughts  Orientation: The patient is oriented to person, place and time  Recent and Remote Memory: short term memory intact and long term memory intact  Attention Span And Concentration: concentration impaired  Insight: Limited insight     Judgment: Her judgment was limited  Muscle Strength And Tone  Muscle strength and tone were normal  Normal gait and station  Language: no difficulty naming common objects, no difficulty repeating a phrase and no difficulty writing a sentence  Fund of knowledge: Patient displays adequate knowledge of current events, adequate fund of knowledge regarding past history and adequate fund of knowledge regarding vocabulary  The patient is experiencing no localized pain  On a scale of 0 - 10 the pain severity is a 0  Treatment Recommendations: 1  Admit to [facility] 2  Medication Management 3  Group Therapy  Risks, Benefits And Possible Side Effects Of Medications: Risks, benefits, and possible side effects of medications explained to patient and patient verbalizes understanding, Risks of medications explained if female patient  Patient verbalizes understanding and agrees to notify her doctor if she becomes pregnant  DSM  NILE Arvizu:   Provisional Diagnosis: Generalized Anxiety Disorder   Major Depression Severe Recurrent without psychotic symptoms  Attention deficit Disorder    Future Appointments    Date/Time Provider Specialty Site   09/01/2016 10:00 AM GEORGETTE Harmon  Psychiatry [facility] PARTIAL HOSPITALIZATION   09/02/2016 10:00 AM GEORGETTE Harmon  Psychiatry [facility] PARTIAL HOSPITALIZATION   09/20/2016 11:00 AM Puja Espinoza LCSW  [facility]     Subjective  [facility] Physician's Orders [facility]: ADMIT TO: Partial Hospitalization 5 x per week for 15 days  Vital signs routine  Diet: regular  Group Psychotherapy 9 x per week    Allied Therapy Group 6 x per week     Diagnosis: F 41 1, F33 2,F90 0  Medications:      âI certify that the continuation of Partial Hospitalization services is medically necessary to improve and/or maintain the patient's condition and functional level, and to prevent relapse or hospitalization, and that this could not be done at a less intensive level of care  â     Physician Signature: Lorrain Kayser, MD     Nurse Signature: Keith Nesbitt RN      Signatures   Electronically signed by : HARSHAL SeniorLSW; Aug 31 2016 12:11PM EST                       (Author)    Electronically signed by : Keith Nesbitt RN; Aug 31 2016  2:34PM EST                       (Author)    Electronically signed by : Keith Nesbitt RN; Aug 31 2016  3:12PM EST                       (Author)    Electronically signed by : FAYE Eubanks; Aug 31 2016  3:37PM EST                       (Author) Include Location In Plan?: Yes Hide Include Location In Plan Question?: No Detail Level: Detailed

## 2019-04-15 DIAGNOSIS — E11.9 TYPE 2 DIABETES MELLITUS WITHOUT COMPLICATION, WITHOUT LONG-TERM CURRENT USE OF INSULIN (HCC): ICD-10-CM

## 2019-04-15 DIAGNOSIS — E78.2 MIXED HYPERLIPIDEMIA: ICD-10-CM

## 2019-04-15 DIAGNOSIS — I10 HYPERTENSION, UNSPECIFIED TYPE: ICD-10-CM

## 2019-04-15 DIAGNOSIS — I10 BENIGN ESSENTIAL HYPERTENSION: ICD-10-CM

## 2019-04-15 RX ORDER — SITAGLIPTIN AND METFORMIN HYDROCHLORIDE 1000; 50 MG/1; MG/1
TABLET, FILM COATED ORAL
Qty: 180 TABLET | Refills: 1 | Status: SHIPPED | OUTPATIENT
Start: 2019-04-15 | End: 2020-06-11 | Stop reason: SDUPTHER

## 2019-04-16 PROCEDURE — 4010F ACE/ARB THERAPY RXD/TAKEN: CPT | Performed by: FAMILY MEDICINE

## 2019-04-16 RX ORDER — METOPROLOL SUCCINATE 50 MG/1
50 TABLET, EXTENDED RELEASE ORAL DAILY
Qty: 90 TABLET | Refills: 1 | Status: SHIPPED | OUTPATIENT
Start: 2019-04-16 | End: 2019-10-22 | Stop reason: SDUPTHER

## 2019-04-16 RX ORDER — RAMIPRIL 1.25 MG/1
1.25 CAPSULE ORAL DAILY
Qty: 90 CAPSULE | Refills: 1 | Status: SHIPPED | OUTPATIENT
Start: 2019-04-16 | End: 2019-10-22 | Stop reason: SDUPTHER

## 2019-04-16 RX ORDER — ATORVASTATIN CALCIUM 20 MG/1
20 TABLET, FILM COATED ORAL DAILY
Qty: 90 TABLET | Refills: 1 | Status: SHIPPED | OUTPATIENT
Start: 2019-04-16 | End: 2019-10-22 | Stop reason: SDUPTHER

## 2019-04-30 ENCOUNTER — OFFICE VISIT (OUTPATIENT)
Dept: OBGYN CLINIC | Facility: CLINIC | Age: 44
End: 2019-04-30
Payer: COMMERCIAL

## 2019-04-30 VITALS
HEIGHT: 59 IN | DIASTOLIC BLOOD PRESSURE: 70 MMHG | WEIGHT: 157 LBS | BODY MASS INDEX: 31.65 KG/M2 | SYSTOLIC BLOOD PRESSURE: 120 MMHG

## 2019-04-30 DIAGNOSIS — R10.2 PELVIC PAIN: ICD-10-CM

## 2019-04-30 DIAGNOSIS — Z12.39 BREAST CANCER SCREENING: ICD-10-CM

## 2019-04-30 DIAGNOSIS — Z01.419 ENCNTR FOR GYN EXAM (GENERAL) (ROUTINE) W/O ABN FINDINGS: Primary | ICD-10-CM

## 2019-04-30 DIAGNOSIS — N92.6 IRREGULAR MENSES: ICD-10-CM

## 2019-04-30 PROCEDURE — 99386 PREV VISIT NEW AGE 40-64: CPT | Performed by: NURSE PRACTITIONER

## 2019-05-04 LAB
ALBUMIN SERPL-MCNC: 4 G/DL (ref 3.6–5.1)
ALBUMIN/GLOB SERPL: 1.6 (CALC) (ref 1–2.5)
ALP SERPL-CCNC: 88 U/L (ref 33–115)
ALT SERPL-CCNC: 15 U/L (ref 6–29)
AST SERPL-CCNC: 14 U/L (ref 10–35)
BILIRUB SERPL-MCNC: 1.1 MG/DL (ref 0.2–1.2)
BUN SERPL-MCNC: 14 MG/DL (ref 7–25)
BUN/CREAT SERPL: ABNORMAL (CALC) (ref 6–22)
CALCIUM SERPL-MCNC: 8.7 MG/DL (ref 8.6–10.2)
CHLORIDE SERPL-SCNC: 99 MMOL/L (ref 98–110)
CHOLEST SERPL-MCNC: 130 MG/DL
CHOLEST/HDLC SERPL: 2.9 (CALC)
CO2 SERPL-SCNC: 28 MMOL/L (ref 20–32)
CREAT SERPL-MCNC: 0.85 MG/DL (ref 0.5–1.1)
GLOBULIN SER CALC-MCNC: 2.5 G/DL (CALC) (ref 1.9–3.7)
GLUCOSE SERPL-MCNC: 264 MG/DL (ref 65–99)
HBA1C MFR BLD: 11.6 % OF TOTAL HGB
HDLC SERPL-MCNC: 45 MG/DL
LDLC SERPL CALC-MCNC: 62 MG/DL (CALC)
NONHDLC SERPL-MCNC: 85 MG/DL (CALC)
POTASSIUM SERPL-SCNC: 4.1 MMOL/L (ref 3.5–5.3)
PROT SERPL-MCNC: 6.5 G/DL (ref 6.1–8.1)
SL AMB EGFR AFRICAN AMERICAN: 96 ML/MIN/1.73M2
SL AMB EGFR NON AFRICAN AMERICAN: 83 ML/MIN/1.73M2
SODIUM SERPL-SCNC: 135 MMOL/L (ref 135–146)
TRIGL SERPL-MCNC: 147 MG/DL

## 2019-05-08 ENCOUNTER — HOSPITAL ENCOUNTER (OUTPATIENT)
Dept: RADIOLOGY | Facility: HOSPITAL | Age: 44
Discharge: HOME | End: 2019-05-08
Payer: COMMERCIAL

## 2019-05-08 VITALS — BODY MASS INDEX: 30.82 KG/M2 | WEIGHT: 157 LBS | HEIGHT: 60 IN

## 2019-05-08 DIAGNOSIS — R10.2 PELVIC PAIN: ICD-10-CM

## 2019-05-08 DIAGNOSIS — Z12.39 BREAST CANCER SCREENING: ICD-10-CM

## 2019-05-08 DIAGNOSIS — N92.6 IRREGULAR MENSES: ICD-10-CM

## 2019-05-08 PROCEDURE — 77063 BREAST TOMOSYNTHESIS BI: CPT

## 2019-05-08 PROCEDURE — 76856 US EXAM PELVIC COMPLETE: CPT

## 2019-05-08 PROCEDURE — 77067 SCR MAMMO BI INCL CAD: CPT

## 2019-05-08 PROCEDURE — 76830 TRANSVAGINAL US NON-OB: CPT

## 2019-05-09 LAB
HPV HR 12 DNA CVX QL NAA+PROBE: NOT DETECTED
HPV16 DNA SPEC QL NAA+PROBE: NOT DETECTED
HPV18 DNA SPEC QL NAA+PROBE: NOT DETECTED
THIN PREP CVX: NORMAL

## 2019-05-14 DIAGNOSIS — N83.202 LEFT OVARIAN CYST: Primary | ICD-10-CM

## 2019-05-14 DIAGNOSIS — N76.0 BACTERIAL VAGINOSIS: ICD-10-CM

## 2019-05-14 DIAGNOSIS — B96.89 BACTERIAL VAGINOSIS: ICD-10-CM

## 2019-05-14 RX ORDER — METRONIDAZOLE 500 MG/1
500 TABLET ORAL EVERY 12 HOURS SCHEDULED
Qty: 14 TABLET | Refills: 0 | Status: SHIPPED | OUTPATIENT
Start: 2019-05-14 | End: 2019-05-21

## 2019-05-17 ENCOUNTER — OFFICE VISIT (OUTPATIENT)
Dept: FAMILY MEDICINE CLINIC | Facility: CLINIC | Age: 44
End: 2019-05-17
Payer: COMMERCIAL

## 2019-05-17 VITALS
WEIGHT: 156 LBS | TEMPERATURE: 98 F | BODY MASS INDEX: 30.63 KG/M2 | HEIGHT: 60 IN | DIASTOLIC BLOOD PRESSURE: 60 MMHG | SYSTOLIC BLOOD PRESSURE: 110 MMHG | HEART RATE: 80 BPM | RESPIRATION RATE: 18 BRPM

## 2019-05-17 DIAGNOSIS — F41.1 GAD (GENERALIZED ANXIETY DISORDER): ICD-10-CM

## 2019-05-17 DIAGNOSIS — F33.2 SEVERE EPISODE OF RECURRENT MAJOR DEPRESSIVE DISORDER, WITHOUT PSYCHOTIC FEATURES (HCC): ICD-10-CM

## 2019-05-17 DIAGNOSIS — I10 BENIGN ESSENTIAL HYPERTENSION: ICD-10-CM

## 2019-05-17 DIAGNOSIS — E78.2 MIXED HYPERLIPIDEMIA: ICD-10-CM

## 2019-05-17 DIAGNOSIS — E11.9 TYPE 2 DIABETES MELLITUS WITHOUT COMPLICATION, WITHOUT LONG-TERM CURRENT USE OF INSULIN (HCC): Primary | ICD-10-CM

## 2019-05-17 DIAGNOSIS — Z23 NEED FOR VACCINATION: ICD-10-CM

## 2019-05-17 DIAGNOSIS — F32.A MODERATELY SEVERE DEPRESSION: ICD-10-CM

## 2019-05-17 DIAGNOSIS — E66.9 OBESITY (BMI 30.0-34.9): ICD-10-CM

## 2019-05-17 DIAGNOSIS — E07.9 THYROID DISORDER: ICD-10-CM

## 2019-05-17 PROCEDURE — 99214 OFFICE O/P EST MOD 30 MIN: CPT | Performed by: FAMILY MEDICINE

## 2019-05-17 PROCEDURE — 3008F BODY MASS INDEX DOCD: CPT | Performed by: FAMILY MEDICINE

## 2019-05-17 RX ORDER — SUVOREXANT 20 MG/1
1 TABLET, FILM COATED ORAL DAILY
COMMUNITY
Start: 2019-05-02 | End: 2020-09-22 | Stop reason: ALTCHOICE

## 2019-05-17 RX ORDER — GLIPIZIDE 10 MG/1
10 TABLET, FILM COATED, EXTENDED RELEASE ORAL DAILY
Qty: 90 TABLET | Refills: 1 | Status: SHIPPED | OUTPATIENT
Start: 2019-05-17 | End: 2019-11-12 | Stop reason: SDUPTHER

## 2019-05-22 ENCOUNTER — CONSULT (OUTPATIENT)
Dept: BARIATRICS | Facility: CLINIC | Age: 44
End: 2019-05-22
Payer: COMMERCIAL

## 2019-05-22 VITALS
TEMPERATURE: 98.2 F | HEIGHT: 60 IN | WEIGHT: 156.4 LBS | DIASTOLIC BLOOD PRESSURE: 68 MMHG | HEART RATE: 86 BPM | BODY MASS INDEX: 30.7 KG/M2 | SYSTOLIC BLOOD PRESSURE: 102 MMHG | RESPIRATION RATE: 14 BRPM

## 2019-05-22 DIAGNOSIS — E78.2 MIXED HYPERLIPIDEMIA: ICD-10-CM

## 2019-05-22 DIAGNOSIS — E28.2 PCOS (POLYCYSTIC OVARIAN SYNDROME): ICD-10-CM

## 2019-05-22 DIAGNOSIS — E11.9 TYPE 2 DIABETES MELLITUS WITHOUT COMPLICATION, WITHOUT LONG-TERM CURRENT USE OF INSULIN (HCC): ICD-10-CM

## 2019-05-22 DIAGNOSIS — F32.A MODERATELY SEVERE DEPRESSION: ICD-10-CM

## 2019-05-22 DIAGNOSIS — E66.9 CLASS 1 OBESITY: ICD-10-CM

## 2019-05-22 DIAGNOSIS — R63.5 ABNORMAL WEIGHT GAIN: Primary | ICD-10-CM

## 2019-05-22 DIAGNOSIS — I10 BENIGN ESSENTIAL HYPERTENSION: ICD-10-CM

## 2019-05-22 PROCEDURE — 99204 OFFICE O/P NEW MOD 45 MIN: CPT | Performed by: PHYSICIAN ASSISTANT

## 2019-05-28 LAB
LEFT EYE DIABETIC RETINOPATHY: NORMAL
LEFT EYE IMAGE QUALITY: NORMAL
LEFT EYE MACULAR EDEMA: NORMAL
LEFT EYE OTHER RETINOPATHY: NORMAL
RIGHT EYE DIABETIC RETINOPATHY: NORMAL
RIGHT EYE IMAGE QUALITY: NORMAL
RIGHT EYE MACULAR EDEMA: NORMAL
RIGHT EYE OTHER RETINOPATHY: NORMAL
SEVERITY (EYE EXAM): NORMAL

## 2019-05-28 PROCEDURE — 3072F LOW RISK FOR RETINOPATHY: CPT | Performed by: FAMILY MEDICINE

## 2019-07-27 ENCOUNTER — HOSPITAL ENCOUNTER (OUTPATIENT)
Dept: RADIOLOGY | Facility: HOSPITAL | Age: 44
Discharge: HOME | End: 2019-07-27
Payer: COMMERCIAL

## 2019-07-27 DIAGNOSIS — N83.202 LEFT OVARIAN CYST: ICD-10-CM

## 2019-07-27 PROCEDURE — 76830 TRANSVAGINAL US NON-OB: CPT

## 2019-07-27 PROCEDURE — 76856 US EXAM PELVIC COMPLETE: CPT

## 2019-08-07 ENCOUNTER — OFFICE VISIT (OUTPATIENT)
Dept: FAMILY MEDICINE CLINIC | Facility: CLINIC | Age: 44
End: 2019-08-07
Payer: COMMERCIAL

## 2019-08-07 ENCOUNTER — TELEPHONE (OUTPATIENT)
Dept: FAMILY MEDICINE CLINIC | Facility: CLINIC | Age: 44
End: 2019-08-07

## 2019-08-07 VITALS
BODY MASS INDEX: 32.46 KG/M2 | WEIGHT: 161 LBS | HEART RATE: 68 BPM | SYSTOLIC BLOOD PRESSURE: 114 MMHG | DIASTOLIC BLOOD PRESSURE: 80 MMHG | RESPIRATION RATE: 16 BRPM | HEIGHT: 59 IN | OXYGEN SATURATION: 98 % | TEMPERATURE: 97.4 F

## 2019-08-07 DIAGNOSIS — L81.9 DISCOLORATION OF SKIN: Primary | ICD-10-CM

## 2019-08-07 PROCEDURE — 99213 OFFICE O/P EST LOW 20 MIN: CPT | Performed by: FAMILY MEDICINE

## 2019-08-07 PROCEDURE — 1036F TOBACCO NON-USER: CPT | Performed by: FAMILY MEDICINE

## 2019-08-07 PROCEDURE — 3008F BODY MASS INDEX DOCD: CPT | Performed by: FAMILY MEDICINE

## 2019-08-07 RX ORDER — NORETHINDRONE 0.35 MG
KIT ORAL
COMMUNITY
Start: 2019-07-26 | End: 2019-10-22 | Stop reason: SDUPTHER

## 2019-08-07 NOTE — TELEPHONE ENCOUNTER
Arm turned blue  She did nothing to it   She said it looks like she is turning into a nurse    Triage

## 2019-08-07 NOTE — PROGRESS NOTES
Assessment/Plan: It was color dye from her clothing and cleaned in room and skin color back to normal         Diagnoses and all orders for this visit:    Discoloration of skin    Other orders  -     SHAROBEL 0 35 MG tablet              Patient Instructions:  Supportive care discussed and advised  Advised to RTO for any worsening and no improvement  Follow up for no improvement and worsening of conditions  Patient advised and educated when to see immediate medical care  Return if symptoms worsen or fail to improve  No future appointments  Subjective:      Patient ID: Karla Grant is a 53 y o  female  Chief Complaint   Patient presents with    both arms turning blue     noticed last nite--lj       HPI  Patient stated that noticed bluish discoloration of both arms upto her elbows last night  Stated that it is not gone away today too  Denies any other symptoms and chest pain, and sob  Vitals:  /80   Pulse 68   Temp (!) 97 4 °F (36 3 °C)   Resp 16   Ht 4' 11" (1 499 m)   Wt 73 kg (161 lb)   SpO2 98%   BMI 32 52 kg/m²     The following portions of the patient's history were reviewed and updated as appropriate: allergies, current medications, past family history, past medical history, past social history, past surgical history and problem list       Review of Systems   Constitutional: Negative  Respiratory: Negative  Cardiovascular: Negative  Skin: Positive for color change  Neurological: Negative  Objective:    Social History     Tobacco Use   Smoking Status Never Smoker   Smokeless Tobacco Never Used       Allergies:    Allergies   Allergen Reactions    Oxycodone-Acetaminophen Nausea Only and Vomiting     Reaction Date: 13Dec2006;          Current Outpatient Medications   Medication Sig Dispense Refill    aspirin (ECOTRIN LOW STRENGTH) 81 mg EC tablet Take 1 tablet (81 mg total) by mouth daily 1 tablet 0    atorvastatin (LIPITOR) 20 mg tablet Take 1 tablet (20 mg total) by mouth daily for 180 days 90 tablet 1    BELSOMRA 20 MG TABS Take 1 tablet by mouth daily       buPROPion (WELLBUTRIN XL) 300 mg 24 hr tablet Take 300 mg by mouth every morning       cloNIDine (CATAPRES) 0 1 mg tablet Take 1 tablet by mouth daily       desvenlafaxine (PRISTIQ) 100 mg 24 hr tablet Take 1 tablet (100 mg total) by mouth daily Indications: Major Depressive Disorder  At 9AM 30 tablet 1    fluticasone (FLONASE) 50 mcg/act nasal spray 1 spray into each nostril daily as needed for rhinitis Indications: Hayfever  16 g 0    gabapentin (NEURONTIN) 300 mg capsule Take 3 capsules by mouth daily       glipiZIDE (GLUCOTROL XL) 10 mg 24 hr tablet Take 1 tablet (10 mg total) by mouth daily 90 tablet 1    JANUMET  MG per tablet TAKE 1 TABLET BY MOUTH 2  TIMES A DAY WITH MEALS 180 tablet 1    metoprolol succinate (TOPROL-XL) 50 mg 24 hr tablet Take 1 tablet (50 mg total) by mouth daily At 9AM 90 tablet 1    Multiple Vitamin (MULTIVITAMINS PO) Take 1 tablet by mouth daily       ramipril (ALTACE) 1 25 mg capsule Take 1 capsule (1 25 mg total) by mouth daily for 180 days 90 capsule 1    SHAROBEL 0 35 MG tablet       VRAYLAR 1 5 MG capsule Take 1 5 mg by mouth daily       hydrOXYzine HCL (ATARAX) 50 mg tablet Take 2 tablets by mouth daily         No current facility-administered medications for this visit  Physical Exam   Constitutional: She is oriented to person, place, and time  She appears well-developed and well-nourished  Cardiovascular: Normal rate, regular rhythm and normal heart sounds  Pulmonary/Chest: Effort normal and breath sounds normal    Neurological: She is alert and oriented to person, place, and time  Skin: Skin is warm and dry  Seems color dye to the arms, I washed patient arms in exam room with soap and water and color came off and then patient remember that was wearing new blue sweat shirt last night      Psychiatric: She has a normal mood and affect   Her behavior is normal  Judgment and thought content normal                    EBENEZER Garnett

## 2019-08-07 NOTE — TELEPHONE ENCOUNTER
Spoke with pt, stated both forearms are bluefish colored where arm bends at elbow  Pt denies sob, chest pain, nausea , vomit or any pain in arm area  Pt denies  hurting arms in anyway, symptoms for 1 day      Pt coming in with Beryle Cousin at 3pm today    Angel Gonsalez MA

## 2019-09-04 ENCOUNTER — TELEPHONE (OUTPATIENT)
Dept: OBGYN CLINIC | Facility: CLINIC | Age: 44
End: 2019-09-04

## 2019-09-04 DIAGNOSIS — B00.9 HSV (HERPES SIMPLEX VIRUS) INFECTION: Primary | ICD-10-CM

## 2019-09-04 RX ORDER — VALACYCLOVIR HYDROCHLORIDE 500 MG/1
500 TABLET, FILM COATED ORAL 2 TIMES DAILY
Qty: 6 TABLET | Refills: 0 | Status: SHIPPED | OUTPATIENT
Start: 2019-09-04 | End: 2020-11-05

## 2019-09-04 NOTE — TELEPHONE ENCOUNTER
Patient states she has just transferred to us and is currently having a break out and is requesting Valtrex be called to the [facility] in [place] , due to the medication not being on her med list I tried and [facility] to see if she had any current scripts on file , Pharmacy did not have a script on file can we call this in for patient    MM CMA

## 2019-10-22 DIAGNOSIS — E78.2 MIXED HYPERLIPIDEMIA: ICD-10-CM

## 2019-10-22 DIAGNOSIS — N92.6 IRREGULAR MENSES: Primary | ICD-10-CM

## 2019-10-22 DIAGNOSIS — I10 BENIGN ESSENTIAL HYPERTENSION: ICD-10-CM

## 2019-10-22 DIAGNOSIS — E11.9 TYPE 2 DIABETES MELLITUS WITHOUT COMPLICATION, WITHOUT LONG-TERM CURRENT USE OF INSULIN (HCC): ICD-10-CM

## 2019-10-22 DIAGNOSIS — I10 HYPERTENSION, UNSPECIFIED TYPE: ICD-10-CM

## 2019-10-22 RX ORDER — NORETHINDRONE 0.35 MG
1 KIT ORAL DAILY
Qty: 28 TABLET | Refills: 0 | Status: SHIPPED | OUTPATIENT
Start: 2019-10-22 | End: 2019-12-15 | Stop reason: SDUPTHER

## 2019-10-23 RX ORDER — ATORVASTATIN CALCIUM 20 MG/1
TABLET, FILM COATED ORAL
Qty: 90 TABLET | Refills: 1 | Status: SHIPPED | OUTPATIENT
Start: 2019-10-23 | End: 2020-04-10

## 2019-10-23 RX ORDER — METOPROLOL SUCCINATE 50 MG/1
TABLET, EXTENDED RELEASE ORAL
Qty: 90 TABLET | Refills: 1 | Status: SHIPPED | OUTPATIENT
Start: 2019-10-23 | End: 2020-04-10

## 2019-10-23 RX ORDER — RAMIPRIL 1.25 MG/1
CAPSULE ORAL
Qty: 90 CAPSULE | Refills: 1 | Status: SHIPPED | OUTPATIENT
Start: 2019-10-23 | End: 2020-04-10

## 2019-11-12 DIAGNOSIS — E11.9 TYPE 2 DIABETES MELLITUS WITHOUT COMPLICATION, WITHOUT LONG-TERM CURRENT USE OF INSULIN (HCC): ICD-10-CM

## 2019-11-12 RX ORDER — GLIPIZIDE 10 MG/1
TABLET, FILM COATED, EXTENDED RELEASE ORAL
Qty: 90 TABLET | Refills: 0 | Status: SHIPPED | OUTPATIENT
Start: 2019-11-12 | End: 2020-03-02

## 2019-12-15 DIAGNOSIS — N92.6 IRREGULAR MENSES: ICD-10-CM

## 2019-12-16 RX ORDER — NORETHINDRONE 0.35 MG
KIT ORAL
Qty: 28 TABLET | Refills: 1 | Status: SHIPPED | OUTPATIENT
Start: 2019-12-16 | End: 2020-02-10

## 2020-02-09 DIAGNOSIS — N92.6 IRREGULAR MENSES: ICD-10-CM

## 2020-02-10 RX ORDER — NORETHINDRONE 0.35 MG
KIT ORAL
Qty: 28 TABLET | Refills: 1 | Status: SHIPPED | OUTPATIENT
Start: 2020-02-10 | End: 2020-04-06

## 2020-03-02 DIAGNOSIS — E11.9 TYPE 2 DIABETES MELLITUS WITHOUT COMPLICATION, WITHOUT LONG-TERM CURRENT USE OF INSULIN (HCC): ICD-10-CM

## 2020-03-02 RX ORDER — GLIPIZIDE 10 MG/1
TABLET, FILM COATED, EXTENDED RELEASE ORAL
Qty: 90 TABLET | Refills: 0 | Status: SHIPPED | OUTPATIENT
Start: 2020-03-02 | End: 2020-06-02

## 2020-04-04 DIAGNOSIS — N92.6 IRREGULAR MENSES: ICD-10-CM

## 2020-04-06 RX ORDER — NORETHINDRONE 0.35 MG
KIT ORAL
Qty: 28 TABLET | Refills: 1 | Status: SHIPPED | OUTPATIENT
Start: 2020-04-06 | End: 2020-06-02

## 2020-04-09 DIAGNOSIS — E11.9 TYPE 2 DIABETES MELLITUS WITHOUT COMPLICATION, WITHOUT LONG-TERM CURRENT USE OF INSULIN (HCC): ICD-10-CM

## 2020-04-09 DIAGNOSIS — I10 HYPERTENSION, UNSPECIFIED TYPE: ICD-10-CM

## 2020-04-09 DIAGNOSIS — I10 BENIGN ESSENTIAL HYPERTENSION: ICD-10-CM

## 2020-04-09 DIAGNOSIS — E78.2 MIXED HYPERLIPIDEMIA: ICD-10-CM

## 2020-04-10 PROCEDURE — 4010F ACE/ARB THERAPY RXD/TAKEN: CPT | Performed by: FAMILY MEDICINE

## 2020-04-10 RX ORDER — ATORVASTATIN CALCIUM 20 MG/1
TABLET, FILM COATED ORAL
Qty: 90 TABLET | Refills: 1 | Status: SHIPPED | OUTPATIENT
Start: 2020-04-10 | End: 2020-09-28

## 2020-04-10 RX ORDER — METOPROLOL SUCCINATE 50 MG/1
TABLET, EXTENDED RELEASE ORAL
Qty: 90 TABLET | Refills: 1 | Status: SHIPPED | OUTPATIENT
Start: 2020-04-10 | End: 2020-09-28

## 2020-04-10 RX ORDER — RAMIPRIL 1.25 MG/1
CAPSULE ORAL
Qty: 90 CAPSULE | Refills: 1 | Status: SHIPPED | OUTPATIENT
Start: 2020-04-10 | End: 2020-09-28

## 2020-05-31 DIAGNOSIS — E11.9 TYPE 2 DIABETES MELLITUS WITHOUT COMPLICATION, WITHOUT LONG-TERM CURRENT USE OF INSULIN (HCC): ICD-10-CM

## 2020-05-31 DIAGNOSIS — N92.6 IRREGULAR MENSES: ICD-10-CM

## 2020-06-02 RX ORDER — GLIPIZIDE 10 MG/1
TABLET, FILM COATED, EXTENDED RELEASE ORAL
Qty: 90 TABLET | Refills: 0 | Status: SHIPPED | OUTPATIENT
Start: 2020-06-02 | End: 2020-09-18

## 2020-06-02 RX ORDER — NORETHINDRONE 0.35 MG
KIT ORAL
Qty: 28 TABLET | Refills: 1 | Status: SHIPPED | OUTPATIENT
Start: 2020-06-02 | End: 2020-07-27

## 2020-06-11 ENCOUNTER — TELEPHONE (OUTPATIENT)
Dept: FAMILY MEDICINE CLINIC | Facility: CLINIC | Age: 45
End: 2020-06-11

## 2020-06-11 ENCOUNTER — OFFICE VISIT (OUTPATIENT)
Dept: FAMILY MEDICINE CLINIC | Facility: CLINIC | Age: 45
End: 2020-06-11
Payer: COMMERCIAL

## 2020-06-11 VITALS
WEIGHT: 145 LBS | TEMPERATURE: 98.2 F | BODY MASS INDEX: 29.23 KG/M2 | OXYGEN SATURATION: 98 % | HEART RATE: 68 BPM | RESPIRATION RATE: 18 BRPM | DIASTOLIC BLOOD PRESSURE: 60 MMHG | HEIGHT: 59 IN | SYSTOLIC BLOOD PRESSURE: 100 MMHG

## 2020-06-11 DIAGNOSIS — I10 BENIGN ESSENTIAL HYPERTENSION: ICD-10-CM

## 2020-06-11 DIAGNOSIS — E11.9 TYPE 2 DIABETES MELLITUS WITHOUT COMPLICATION, WITHOUT LONG-TERM CURRENT USE OF INSULIN (HCC): ICD-10-CM

## 2020-06-11 DIAGNOSIS — E78.2 MIXED HYPERLIPIDEMIA: ICD-10-CM

## 2020-06-11 DIAGNOSIS — Z23 NEED FOR VACCINATION: ICD-10-CM

## 2020-06-11 DIAGNOSIS — Z12.39 SCREENING FOR BREAST CANCER: ICD-10-CM

## 2020-06-11 DIAGNOSIS — E11.9 TYPE 2 DIABETES MELLITUS WITHOUT COMPLICATION, WITHOUT LONG-TERM CURRENT USE OF INSULIN (HCC): Primary | ICD-10-CM

## 2020-06-11 DIAGNOSIS — Z11.4 SCREENING FOR HIV (HUMAN IMMUNODEFICIENCY VIRUS): ICD-10-CM

## 2020-06-11 DIAGNOSIS — F32.A MODERATELY SEVERE DEPRESSION: ICD-10-CM

## 2020-06-11 DIAGNOSIS — E07.9 THYROID DISORDER: ICD-10-CM

## 2020-06-11 DIAGNOSIS — Z00.00 MEDICARE ANNUAL WELLNESS VISIT, INITIAL: Primary | ICD-10-CM

## 2020-06-11 LAB — SL AMB POCT HEMOGLOBIN AIC: 10.8 (ref ?–6.5)

## 2020-06-11 PROCEDURE — 2022F DILAT RTA XM EVC RTNOPTHY: CPT | Performed by: FAMILY MEDICINE

## 2020-06-11 PROCEDURE — 90471 IMMUNIZATION ADMIN: CPT

## 2020-06-11 PROCEDURE — 1036F TOBACCO NON-USER: CPT | Performed by: FAMILY MEDICINE

## 2020-06-11 PROCEDURE — 3078F DIAST BP <80 MM HG: CPT | Performed by: FAMILY MEDICINE

## 2020-06-11 PROCEDURE — 83036 HEMOGLOBIN GLYCOSYLATED A1C: CPT | Performed by: FAMILY MEDICINE

## 2020-06-11 PROCEDURE — 36415 COLL VENOUS BLD VENIPUNCTURE: CPT | Performed by: FAMILY MEDICINE

## 2020-06-11 PROCEDURE — 90715 TDAP VACCINE 7 YRS/> IM: CPT

## 2020-06-11 PROCEDURE — 3008F BODY MASS INDEX DOCD: CPT | Performed by: FAMILY MEDICINE

## 2020-06-11 PROCEDURE — 3046F HEMOGLOBIN A1C LEVEL >9.0%: CPT | Performed by: FAMILY MEDICINE

## 2020-06-11 PROCEDURE — G0438 PPPS, INITIAL VISIT: HCPCS | Performed by: FAMILY MEDICINE

## 2020-06-11 PROCEDURE — 3074F SYST BP LT 130 MM HG: CPT | Performed by: FAMILY MEDICINE

## 2020-06-17 ENCOUNTER — TELEPHONE (OUTPATIENT)
Dept: FAMILY MEDICINE CLINIC | Facility: CLINIC | Age: 45
End: 2020-06-17

## 2020-06-17 NOTE — TELEPHONE ENCOUNTER
PA needed for Jentadueto with [employer] Form  Completed and faxed back  Insurance declined Janumet earlier  Waiting for a response or more questions   Form was faxed   Sly Rivera

## 2020-07-25 DIAGNOSIS — N92.6 IRREGULAR MENSES: ICD-10-CM

## 2020-07-27 RX ORDER — NORETHINDRONE 0.35 MG
KIT ORAL
Qty: 28 TABLET | Refills: 0 | Status: SHIPPED | OUTPATIENT
Start: 2020-07-27 | End: 2020-09-08 | Stop reason: SDUPTHER

## 2020-09-08 DIAGNOSIS — N92.6 IRREGULAR MENSES: ICD-10-CM

## 2020-09-08 RX ORDER — ACETAMINOPHEN AND CODEINE PHOSPHATE 120; 12 MG/5ML; MG/5ML
1 SOLUTION ORAL DAILY
Qty: 28 TABLET | Refills: 3 | Status: SHIPPED | OUTPATIENT
Start: 2020-09-08 | End: 2020-12-28

## 2020-09-16 LAB
ALBUMIN SERPL-MCNC: 3.9 G/DL (ref 3.6–5.1)
ALBUMIN/CREAT UR: 6 MCG/MG CREAT
ALBUMIN/GLOB SERPL: 1.7 (CALC) (ref 1–2.5)
ALP SERPL-CCNC: 90 U/L (ref 31–125)
ALT SERPL-CCNC: 15 U/L (ref 6–29)
AST SERPL-CCNC: 13 U/L (ref 10–35)
BILIRUB SERPL-MCNC: 0.5 MG/DL (ref 0.2–1.2)
BUN SERPL-MCNC: 13 MG/DL (ref 7–25)
BUN/CREAT SERPL: ABNORMAL (CALC) (ref 6–22)
CALCIUM SERPL-MCNC: 8.6 MG/DL (ref 8.6–10.2)
CHLORIDE SERPL-SCNC: 102 MMOL/L (ref 98–110)
CHOLEST SERPL-MCNC: 104 MG/DL
CHOLEST/HDLC SERPL: 2.5 (CALC)
CO2 SERPL-SCNC: 30 MMOL/L (ref 20–32)
CREAT SERPL-MCNC: 0.76 MG/DL (ref 0.5–1.1)
CREAT UR-MCNC: 71 MG/DL (ref 20–275)
GLOBULIN SER CALC-MCNC: 2.3 G/DL (CALC) (ref 1.9–3.7)
GLUCOSE SERPL-MCNC: 311 MG/DL (ref 65–99)
HBA1C MFR BLD: 9.1 % OF TOTAL HGB
HDLC SERPL-MCNC: 42 MG/DL
HIV 1+2 AB+HIV1 P24 AG SERPL QL IA: NORMAL
LDLC SERPL CALC-MCNC: 42 MG/DL (CALC)
MICROALBUMIN UR-MCNC: 0.4 MG/DL
NONHDLC SERPL-MCNC: 62 MG/DL (CALC)
POTASSIUM SERPL-SCNC: 3.7 MMOL/L (ref 3.5–5.3)
PROT SERPL-MCNC: 6.2 G/DL (ref 6.1–8.1)
SL AMB EGFR AFRICAN AMERICAN: 108 ML/MIN/1.73M2
SL AMB EGFR NON AFRICAN AMERICAN: 93 ML/MIN/1.73M2
SODIUM SERPL-SCNC: 137 MMOL/L (ref 135–146)
TRIGL SERPL-MCNC: 112 MG/DL
TSH SERPL-ACNC: 1.62 MIU/L

## 2020-09-16 PROCEDURE — 3061F NEG MICROALBUMINURIA REV: CPT | Performed by: FAMILY MEDICINE

## 2020-09-16 PROCEDURE — 3046F HEMOGLOBIN A1C LEVEL >9.0%: CPT | Performed by: FAMILY MEDICINE

## 2020-09-18 DIAGNOSIS — E11.9 TYPE 2 DIABETES MELLITUS WITHOUT COMPLICATION, WITHOUT LONG-TERM CURRENT USE OF INSULIN (HCC): ICD-10-CM

## 2020-09-18 RX ORDER — GLIPIZIDE 10 MG/1
TABLET, FILM COATED, EXTENDED RELEASE ORAL
Qty: 90 TABLET | Refills: 0 | Status: SHIPPED | OUTPATIENT
Start: 2020-09-18 | End: 2021-01-30

## 2020-09-22 ENCOUNTER — OFFICE VISIT (OUTPATIENT)
Dept: FAMILY MEDICINE CLINIC | Facility: CLINIC | Age: 45
End: 2020-09-22
Payer: COMMERCIAL

## 2020-09-22 VITALS
SYSTOLIC BLOOD PRESSURE: 100 MMHG | OXYGEN SATURATION: 95 % | HEART RATE: 86 BPM | TEMPERATURE: 98.6 F | RESPIRATION RATE: 16 BRPM | DIASTOLIC BLOOD PRESSURE: 70 MMHG | BODY MASS INDEX: 29.64 KG/M2 | WEIGHT: 147 LBS | HEIGHT: 59 IN

## 2020-09-22 DIAGNOSIS — E78.2 MIXED HYPERLIPIDEMIA: ICD-10-CM

## 2020-09-22 DIAGNOSIS — I10 BENIGN ESSENTIAL HYPERTENSION: ICD-10-CM

## 2020-09-22 DIAGNOSIS — F41.1 GAD (GENERALIZED ANXIETY DISORDER): ICD-10-CM

## 2020-09-22 DIAGNOSIS — E11.9 TYPE 2 DIABETES MELLITUS WITHOUT COMPLICATION, WITHOUT LONG-TERM CURRENT USE OF INSULIN (HCC): Primary | ICD-10-CM

## 2020-09-22 PROCEDURE — 1036F TOBACCO NON-USER: CPT | Performed by: FAMILY MEDICINE

## 2020-09-22 PROCEDURE — 3074F SYST BP LT 130 MM HG: CPT | Performed by: FAMILY MEDICINE

## 2020-09-22 PROCEDURE — 3078F DIAST BP <80 MM HG: CPT | Performed by: FAMILY MEDICINE

## 2020-09-22 PROCEDURE — 99214 OFFICE O/P EST MOD 30 MIN: CPT | Performed by: FAMILY MEDICINE

## 2020-09-22 RX ORDER — CARIPRAZINE 3 MG/1
3 CAPSULE, GELATIN COATED ORAL DAILY
COMMUNITY
Start: 2020-09-01 | End: 2021-12-17

## 2020-09-22 RX ORDER — TRAZODONE HYDROCHLORIDE 100 MG/1
100 TABLET ORAL DAILY
COMMUNITY
Start: 2020-09-15

## 2020-09-22 RX ORDER — DAPAGLIFLOZIN 10 MG/1
10 TABLET, FILM COATED ORAL DAILY
Qty: 90 TABLET | Refills: 1 | Status: SHIPPED | OUTPATIENT
Start: 2020-09-22 | End: 2021-06-10

## 2020-09-22 NOTE — PROGRESS NOTES
Assessment/Plan:    1  Type 2 diabetes mellitus without complication, without long-term current use of insulin (HCC)  -     IRIS Diabetic eye exam  -     Dapagliflozin Propanediol (Farxiga) 10 MG TABS; Take 1 tablet (10 mg total) by mouth daily  -     Hemoglobin A1C; Future; Expected date: 12/06/2020  -     Comprehensive metabolic panel; Future; Expected date: 12/06/2020    2  Benign essential hypertension    3  Mixed hyperlipidemia    4  FORTINO (generalized anxiety disorder)  Assessment & Plan:  stable      BMI Counseling: Body mass index is 29 69 kg/m²  The BMI is above normal  Nutrition recommendations include decreasing portion sizes  Exercise recommendations include moderate physical activity 150 minutes/week  No pharmacotherapy was ordered  Patient Instructions     Depression   AMBULATORY CARE:   Depression  is a medical condition that causes feelings of sadness or hopelessness that do not go away  Depression may cause you to lose interest in things you used to enjoy  These feelings may interfere with your daily life  Common symptoms include the following:   · Appetite changes, or weight gain or loss    · Trouble going to sleep or staying asleep, or sleeping too much    · Fatigue or lack of energy    · Feeling restless, irritable, or withdrawn    · Feeling worthless, hopeless, discouraged, or guilty    · Trouble concentrating, remembering things, doing daily tasks, or making decisions    · Thoughts about hurting or killing yourself  Call 911 for any of the following:   · You think about harming yourself or someone else  Contact your healthcare provider if:   · Your symptoms do not improve  · You cannot make it to your next appointment  · You have new symptoms  · You have questions or concerns about your condition or care  Treatment for depression  may include medicine to improve or balance your mood  Therapy may also be used to treat your depression   A therapist will help you learn to cope with your thoughts and feelings  Therapy can be done alone or in a group  It may also be done with family members or a significant other  Self-care:   · Get regular physical activity  Try to exercise for 30 minutes, 3 to 5 days a week  Work with your healthcare provider to develop an exercise plan that you enjoy  Physical activity may improve your symptoms  · Get enough sleep  Create a routine to help you relax before bed  You can listen to music, read, or do yoga  Try to go to bed and wake up at the same time every day  Sleep is important for emotional health  · Eat a variety of healthy foods from all of the food groups  A healthy meal plan is low in fat, salt, and added sugar  Ask your healthcare provider for more information about a meal plan that is right for you  · Do not drink alcohol or use drugs  Alcohol and drugs can make your symptoms worse  Follow up with your healthcare provider as directed: Your healthcare provider will monitor your progress at follow-up visits  He or she will also monitor your medicine if you take antidepressants  Your healthcare provider will ask if the medicine is helping  Tell him or her about any side effects or problems you may have with your medicine  The type or amount of medicine may need to be changed  Write down your questions so you remember to ask them during your visits  © 2017 [address] [facility] Information is for End User's use only and may not be sold, redistributed or otherwise used for commercial purposes  All illustrations and images included in CareNotes® are the copyrighted property of [employer]  or [employer]  The above information is an  only  It is not intended as medical advice for individual conditions or treatments  Talk to your doctor, nurse or pharmacist before following any medical regimen to see if it is safe and effective for you          Return in about 3 months (around 12/22/2020) for Recheck  Subjective:      Patient ID: Stefano Albert is a 50 y o  female  Chief Complaint   Patient presents with    Follow-up     3 month [id number]       Pt is here for a three month follow up  Pt had labs  No CP, no SOB  No polyuria no polydipsia    Pt hasorder for mammo will sched      The following portions of the patient's history were reviewed and updated as appropriate: allergies, current medications, past family history, past medical history, past social history, past surgical history and problem list     Review of Systems   Constitutional: Negative  Negative for activity change, appetite change, chills, diaphoresis and fatigue  HENT: Negative  Negative for dental problem, ear pain, sinus pressure and sore throat  Eyes: Negative  Negative for photophobia, pain, discharge, redness, itching and visual disturbance  Respiratory: Negative for apnea and chest tightness  Cardiovascular: Negative  Negative for chest pain, palpitations and leg swelling  Gastrointestinal: Negative  Negative for abdominal distention, abdominal pain, constipation and diarrhea  Endocrine: Negative  Negative for cold intolerance and heat intolerance  Genitourinary: Negative  Negative for difficulty urinating and dyspareunia  Musculoskeletal: Negative  Negative for arthralgias and back pain  Skin: Negative  Allergic/Immunologic: Negative for environmental allergies  Neurological: Negative  Negative for dizziness  Psychiatric/Behavioral: Negative  Negative for agitation           Current Outpatient Medications   Medication Sig Dispense Refill    aspirin (ECOTRIN LOW STRENGTH) 81 mg EC tablet Take 1 tablet (81 mg total) by mouth daily 1 tablet 0    atorvastatin (LIPITOR) 20 mg tablet TAKE 1 TABLET BY MOUTH  EVERY DAY 90 tablet 1    buPROPion (WELLBUTRIN XL) 300 mg 24 hr tablet Take 300 mg by mouth every morning       cloNIDine (CATAPRES) 0 1 mg tablet Take 1 tablet by mouth daily       desvenlafaxine (PRISTIQ) 100 mg 24 hr tablet Take 1 tablet (100 mg total) by mouth daily Indications: Major Depressive Disorder  At 9AM 30 tablet 1    fluticasone (FLONASE) 50 mcg/act nasal spray 1 spray into each nostril daily as needed for rhinitis Indications: Hayfever  16 g 0    gabapentin (NEURONTIN) 300 mg capsule Take 3 capsules by mouth daily       glipiZIDE (GLUCOTROL XL) 10 mg 24 hr tablet TAKE ONE TABLET BY MOUTH EVERY DAY (GENERIC FOR GLUCOTROL XL) 90 tablet 0    hydrOXYzine HCL (ATARAX) 50 mg tablet Take 2 tablets by mouth daily        linaGLIPtin-metFORMIN HCl (Jentadueto) 2 5-1000 MG TABS Take 1 tablet by mouth 2 (two) times a day 90 tablet 1    metoprolol succinate (TOPROL-XL) 50 mg 24 hr tablet TAKE 1 TABLET BY MOUTH  DAILY AT 9AM 90 tablet 1    Multiple Vitamin (MULTIVITAMINS PO) Take 1 tablet by mouth daily       norethindrone (Sharobel) 0 35 MG tablet Take 1 tablet (0 35 mg total) by mouth daily 28 tablet 3    ramipril (ALTACE) 1 25 mg capsule TAKE 1 CAPSULE BY MOUTH  DAILY 90 capsule 1    traZODone (DESYREL) 100 mg tablet Take 100 mg by mouth daily      VRAYLAR 1 5 MG capsule Take 1 5 mg by mouth daily       Vraylar 3 MG capsule Take 3 mg by mouth daily      Dapagliflozin Propanediol (Farxiga) 10 MG TABS Take 1 tablet (10 mg total) by mouth daily 90 tablet 1    valACYclovir (VALTREX) 500 mg tablet Take 1 tablet (500 mg total) by mouth 2 (two) times a day for 3 days (Patient not taking: Reported on 9/22/2020) 6 tablet 0     No current facility-administered medications for this visit  Objective:    /70   Pulse 86   Temp 98 6 °F (37 °C)   Resp 16   Ht 4' 11" (1 499 m)   Wt 66 7 kg (147 lb)   LMP 09/17/2020   SpO2 95%   BMI 29 69 kg/m²        Physical Exam  Vitals signs and nursing note reviewed  Constitutional:       General: She is not in acute distress  Appearance: She is well-developed  She is not diaphoretic     HENT:      Head: Normocephalic and atraumatic  Right Ear: External ear normal       Left Ear: External ear normal       Nose: Nose normal       Mouth/Throat:      Pharynx: No oropharyngeal exudate  Eyes:      General: No scleral icterus  Right eye: No discharge  Left eye: No discharge  Pupils: Pupils are equal, round, and reactive to light  Neck:      Thyroid: No thyromegaly  Cardiovascular:      Rate and Rhythm: Normal rate  Heart sounds: Normal heart sounds  No murmur  Pulmonary:      Effort: Pulmonary effort is normal  No respiratory distress  Breath sounds: Normal breath sounds  No wheezing  Abdominal:      General: Bowel sounds are normal  There is no distension  Palpations: Abdomen is soft  There is no mass  Tenderness: There is no abdominal tenderness  There is no guarding or rebound  Musculoskeletal: Normal range of motion  Skin:     General: Skin is warm and dry  Findings: No erythema or rash  Neurological:      Mental Status: She is alert  Coordination: Coordination normal       Deep Tendon Reflexes: Reflexes normal    Psychiatric:         Behavior: Behavior normal               Recent Results (from the past 672 hour(s))   Lipid Panel with Direct LDL reflex    Collection Time: 09/15/20 10:29 AM   Result Value Ref Range    Total Cholesterol 104 <200 mg/dL    HDL 42 (L) > OR = 50 mg/dL    Triglycerides 112 <150 mg/dL    LDL Calculated 42 mg/dL (calc)    Chol HDLC Ratio 2 5 <5 0 (calc)    Non-HDL Cholesterol 62 <130 mg/dL (calc)   Microalbumin, Random Urine (W/Creatinine)    Collection Time: 09/15/20 10:29 AM   Result Value Ref Range    Creatinine, Urine 71 20 - 275 mg/dL    Microalbum  ,U,Random 0 4 See Note: mg/dL    Microalb/Creat Ratio 6 <30 mcg/mg creat   Human Immunodeficiency Virus 1/2 Antigen / Antibody ( Fourth Generation) with Reflex Testing    Collection Time: 09/15/20 10:29 AM   Result Value Ref Range    HIV AG/AB, 4th Gen NON-REACTIVE NON-REACTIVE Comprehensive metabolic panel    Collection Time: 09/15/20 10:29 AM   Result Value Ref Range    Glucose, Random 311 (H) 65 - 99 mg/dL    BUN 13 7 - 25 mg/dL    Creatinine 0 76 0 50 - 1 10 mg/dL    eGFR Non  93 > OR = 60 mL/min/1 73m2    eGFR  108 > OR = 60 mL/min/1 73m2    SL AMB BUN/CREATININE RATIO NOT APPLICABLE 6 - 22 (calc)    Sodium 137 135 - 146 mmol/L    Potassium 3 7 3 5 - 5 3 mmol/L    Chloride 102 98 - 110 mmol/L    CO2 30 20 - 32 mmol/L    Calcium 8 6 8 6 - 10 2 mg/dL    Protein, Total 6 2 6 1 - 8 1 g/dL    Albumin 3 9 3 6 - 5 1 g/dL    Globulin 2 3 1 9 - 3 7 g/dL (calc)    Albumin/Globulin Ratio 1 7 1 0 - 2 5 (calc)    TOTAL BILIRUBIN 0 5 0 2 - 1 2 mg/dL    Alkaline Phosphatase 90 31 - 125 U/L    AST 13 10 - 35 U/L    ALT 15 6 - 29 U/L   TSH, 3rd generation    Collection Time: 09/15/20 10:29 AM   Result Value Ref Range    TSH 1 62 mIU/L   Hemoglobin A1c (w/out EAG)    Collection Time: 09/15/20 10:29 AM   Result Value Ref Range    Hemoglobin A1C 9 1 (H) <5 7 % of total Hgb         Alejo Florez DO  Depression Screening Follow-up Plan: Patient's depression screening was positive with a PHQ-2 score of   Their PHQ-9 score was   Patient assessed for underlying major depression  They have no active suicidal ideations  Brief counseling provided and recommend additional follow-up/re-evaluation next office visit

## 2020-09-22 NOTE — PATIENT INSTRUCTIONS
Depression   AMBULATORY CARE:   Depression  is a medical condition that causes feelings of sadness or hopelessness that do not go away  Depression may cause you to lose interest in things you used to enjoy  These feelings may interfere with your daily life  Common symptoms include the following:   · Appetite changes, or weight gain or loss    · Trouble going to sleep or staying asleep, or sleeping too much    · Fatigue or lack of energy    · Feeling restless, irritable, or withdrawn    · Feeling worthless, hopeless, discouraged, or guilty    · Trouble concentrating, remembering things, doing daily tasks, or making decisions    · Thoughts about hurting or killing yourself  Call 911 for any of the following:   · You think about harming yourself or someone else  Contact your healthcare provider if:   · Your symptoms do not improve  · You cannot make it to your next appointment  · You have new symptoms  · You have questions or concerns about your condition or care  Treatment for depression  may include medicine to improve or balance your mood  Therapy may also be used to treat your depression  A therapist will help you learn to cope with your thoughts and feelings  Therapy can be done alone or in a group  It may also be done with family members or a significant other  Self-care:   · Get regular physical activity  Try to exercise for 30 minutes, 3 to 5 days a week  Work with your healthcare provider to develop an exercise plan that you enjoy  Physical activity may improve your symptoms  · Get enough sleep  Create a routine to help you relax before bed  You can listen to music, read, or do yoga  Try to go to bed and wake up at the same time every day  Sleep is important for emotional health  · Eat a variety of healthy foods from all of the food groups  A healthy meal plan is low in fat, salt, and added sugar   Ask your healthcare provider for more information about a meal plan that is right for you  · Do not drink alcohol or use drugs  Alcohol and drugs can make your symptoms worse  Follow up with your healthcare provider as directed: Your healthcare provider will monitor your progress at follow-up visits  He or she will also monitor your medicine if you take antidepressants  Your healthcare provider will ask if the medicine is helping  Tell him or her about any side effects or problems you may have with your medicine  The type or amount of medicine may need to be changed  Write down your questions so you remember to ask them during your visits  © 2017 [address] [employer]  Information is for End User's use only and may not be sold, redistributed or otherwise used for commercial purposes  All illustrations and images included in CareNotes® are the copyrighted property of [employer]  or [employer]  The above information is an  only  It is not intended as medical advice for individual conditions or treatments  Talk to your doctor, nurse or pharmacist before following any medical regimen to see if it is safe and effective for you

## 2020-09-23 PROCEDURE — 2025F 7 FLD RTA PHOTO W/O RTNOPTHY: CPT | Performed by: FAMILY MEDICINE

## 2020-09-26 DIAGNOSIS — E11.9 TYPE 2 DIABETES MELLITUS WITHOUT COMPLICATION, WITHOUT LONG-TERM CURRENT USE OF INSULIN (HCC): ICD-10-CM

## 2020-09-26 DIAGNOSIS — I10 BENIGN ESSENTIAL HYPERTENSION: ICD-10-CM

## 2020-09-26 DIAGNOSIS — E78.2 MIXED HYPERLIPIDEMIA: ICD-10-CM

## 2020-09-26 DIAGNOSIS — I10 HYPERTENSION, UNSPECIFIED TYPE: ICD-10-CM

## 2020-09-28 PROCEDURE — 4010F ACE/ARB THERAPY RXD/TAKEN: CPT | Performed by: FAMILY MEDICINE

## 2020-09-28 RX ORDER — METOPROLOL SUCCINATE 50 MG/1
TABLET, EXTENDED RELEASE ORAL
Qty: 90 TABLET | Refills: 3 | Status: SHIPPED | OUTPATIENT
Start: 2020-09-28 | End: 2021-08-17

## 2020-09-28 RX ORDER — ATORVASTATIN CALCIUM 20 MG/1
TABLET, FILM COATED ORAL
Qty: 90 TABLET | Refills: 3 | Status: SHIPPED | OUTPATIENT
Start: 2020-09-28 | End: 2021-08-17

## 2020-09-28 RX ORDER — RAMIPRIL 1.25 MG/1
CAPSULE ORAL
Qty: 90 CAPSULE | Refills: 3 | Status: SHIPPED | OUTPATIENT
Start: 2020-09-28 | End: 2021-09-14

## 2020-11-05 ENCOUNTER — ANNUAL EXAM (OUTPATIENT)
Dept: OBGYN CLINIC | Facility: CLINIC | Age: 45
End: 2020-11-05
Payer: COMMERCIAL

## 2020-11-05 VITALS — BODY MASS INDEX: 28.88 KG/M2 | SYSTOLIC BLOOD PRESSURE: 104 MMHG | WEIGHT: 143 LBS | DIASTOLIC BLOOD PRESSURE: 70 MMHG

## 2020-11-05 DIAGNOSIS — N76.0 ACUTE VAGINITIS: ICD-10-CM

## 2020-11-05 DIAGNOSIS — Z01.419 WELL WOMAN EXAM: Primary | ICD-10-CM

## 2020-11-05 DIAGNOSIS — R10.2 PELVIC PAIN: ICD-10-CM

## 2020-11-05 DIAGNOSIS — R39.15 URINARY URGENCY: ICD-10-CM

## 2020-11-05 DIAGNOSIS — Z12.31 ENCOUNTER FOR SCREENING MAMMOGRAM FOR MALIGNANT NEOPLASM OF BREAST: ICD-10-CM

## 2020-11-05 PROCEDURE — G0101 CA SCREEN;PELVIC/BREAST EXAM: HCPCS | Performed by: PHYSICIAN ASSISTANT

## 2020-11-05 RX ORDER — FLUCONAZOLE 150 MG/1
150 TABLET ORAL ONCE
Qty: 1 TABLET | Refills: 0 | Status: SHIPPED | OUTPATIENT
Start: 2020-11-05 | End: 2020-11-05

## 2020-11-30 ENCOUNTER — VBI (OUTPATIENT)
Dept: ADMINISTRATIVE | Facility: OTHER | Age: 45
End: 2020-11-30

## 2020-12-16 DIAGNOSIS — E11.9 TYPE 2 DIABETES MELLITUS WITHOUT COMPLICATION, WITHOUT LONG-TERM CURRENT USE OF INSULIN (HCC): ICD-10-CM

## 2020-12-16 RX ORDER — LINAGLIPTIN AND METFORMIN HYDROCHLORIDE 2.5; 1 MG/1; MG/1
TABLET, FILM COATED ORAL
Qty: 90 TABLET | Refills: 1 | Status: SHIPPED | OUTPATIENT
Start: 2020-12-16 | End: 2021-06-18

## 2020-12-26 DIAGNOSIS — N92.6 IRREGULAR MENSES: ICD-10-CM

## 2020-12-28 ENCOUNTER — TELEPHONE (OUTPATIENT)
Dept: FAMILY MEDICINE CLINIC | Facility: CLINIC | Age: 45
End: 2020-12-28

## 2020-12-28 RX ORDER — NORETHINDRONE 0.35 MG
KIT ORAL
Qty: 28 TABLET | Refills: 11 | Status: SHIPPED | OUTPATIENT
Start: 2020-12-28 | End: 2021-12-07

## 2021-01-26 LAB
ALBUMIN SERPL-MCNC: 4.2 G/DL (ref 3.6–5.1)
ALBUMIN/GLOB SERPL: 1.8 (CALC) (ref 1–2.5)
ALP SERPL-CCNC: 56 U/L (ref 31–125)
ALT SERPL-CCNC: 12 U/L (ref 6–29)
AST SERPL-CCNC: 16 U/L (ref 10–35)
BILIRUB SERPL-MCNC: 0.8 MG/DL (ref 0.2–1.2)
BUN SERPL-MCNC: 15 MG/DL (ref 7–25)
BUN/CREAT SERPL: ABNORMAL (CALC) (ref 6–22)
CALCIUM SERPL-MCNC: 8.7 MG/DL (ref 8.6–10.2)
CHLORIDE SERPL-SCNC: 101 MMOL/L (ref 98–110)
CO2 SERPL-SCNC: 27 MMOL/L (ref 20–32)
CREAT SERPL-MCNC: 0.73 MG/DL (ref 0.5–1.1)
GLOBULIN SER CALC-MCNC: 2.3 G/DL (CALC) (ref 1.9–3.7)
GLUCOSE SERPL-MCNC: 161 MG/DL (ref 65–99)
HBA1C MFR BLD: 7.5 % OF TOTAL HGB
POTASSIUM SERPL-SCNC: 3.9 MMOL/L (ref 3.5–5.3)
PROT SERPL-MCNC: 6.5 G/DL (ref 6.1–8.1)
SL AMB EGFR AFRICAN AMERICAN: 114 ML/MIN/1.73M2
SL AMB EGFR NON AFRICAN AMERICAN: 98 ML/MIN/1.73M2
SODIUM SERPL-SCNC: 137 MMOL/L (ref 135–146)

## 2021-01-29 DIAGNOSIS — E11.9 TYPE 2 DIABETES MELLITUS WITHOUT COMPLICATION, WITHOUT LONG-TERM CURRENT USE OF INSULIN (HCC): ICD-10-CM

## 2021-01-30 RX ORDER — GLIPIZIDE 10 MG/1
TABLET, FILM COATED, EXTENDED RELEASE ORAL
Qty: 90 TABLET | Refills: 0 | Status: SHIPPED | OUTPATIENT
Start: 2021-01-30 | End: 2021-06-10

## 2021-02-19 ENCOUNTER — OFFICE VISIT (OUTPATIENT)
Dept: FAMILY MEDICINE CLINIC | Facility: CLINIC | Age: 46
End: 2021-02-19
Payer: COMMERCIAL

## 2021-02-19 ENCOUNTER — APPOINTMENT (OUTPATIENT)
Dept: RADIOLOGY | Facility: CLINIC | Age: 46
End: 2021-02-19
Payer: COMMERCIAL

## 2021-02-19 VITALS
RESPIRATION RATE: 18 BRPM | DIASTOLIC BLOOD PRESSURE: 66 MMHG | HEART RATE: 83 BPM | HEIGHT: 59 IN | SYSTOLIC BLOOD PRESSURE: 110 MMHG | TEMPERATURE: 97.4 F | BODY MASS INDEX: 27.82 KG/M2 | OXYGEN SATURATION: 98 % | WEIGHT: 138 LBS

## 2021-02-19 DIAGNOSIS — M25.511 CHRONIC RIGHT SHOULDER PAIN: ICD-10-CM

## 2021-02-19 DIAGNOSIS — G89.29 CHRONIC RIGHT SHOULDER PAIN: ICD-10-CM

## 2021-02-19 DIAGNOSIS — F41.1 GAD (GENERALIZED ANXIETY DISORDER): ICD-10-CM

## 2021-02-19 DIAGNOSIS — E78.2 MIXED HYPERLIPIDEMIA: ICD-10-CM

## 2021-02-19 DIAGNOSIS — I10 BENIGN ESSENTIAL HYPERTENSION: ICD-10-CM

## 2021-02-19 DIAGNOSIS — F33.2 SEVERE EPISODE OF RECURRENT MAJOR DEPRESSIVE DISORDER, WITHOUT PSYCHOTIC FEATURES (HCC): ICD-10-CM

## 2021-02-19 DIAGNOSIS — E11.9 TYPE 2 DIABETES MELLITUS WITHOUT COMPLICATION, WITHOUT LONG-TERM CURRENT USE OF INSULIN (HCC): Primary | ICD-10-CM

## 2021-02-19 PROCEDURE — 99214 OFFICE O/P EST MOD 30 MIN: CPT | Performed by: FAMILY MEDICINE

## 2021-02-19 PROCEDURE — 3074F SYST BP LT 130 MM HG: CPT | Performed by: FAMILY MEDICINE

## 2021-02-19 PROCEDURE — 3078F DIAST BP <80 MM HG: CPT | Performed by: FAMILY MEDICINE

## 2021-02-19 PROCEDURE — 73030 X-RAY EXAM OF SHOULDER: CPT

## 2021-02-19 PROCEDURE — 3008F BODY MASS INDEX DOCD: CPT | Performed by: FAMILY MEDICINE

## 2021-02-19 PROCEDURE — 1036F TOBACCO NON-USER: CPT | Performed by: FAMILY MEDICINE

## 2021-02-19 PROCEDURE — 3725F SCREEN DEPRESSION PERFORMED: CPT | Performed by: FAMILY MEDICINE

## 2021-02-19 RX ORDER — CARIPRAZINE 4.5 MG/1
CAPSULE, GELATIN COATED ORAL DAILY
COMMUNITY
Start: 2020-11-16 | End: 2021-12-17

## 2021-02-19 RX ORDER — MELOXICAM 15 MG/1
15 TABLET ORAL DAILY
Qty: 90 TABLET | Refills: 1 | Status: SHIPPED | OUTPATIENT
Start: 2021-02-19 | End: 2022-03-22

## 2021-02-19 NOTE — PROGRESS NOTES
Assessment/Plan:    1  Type 2 diabetes mellitus without complication, without long-term current use of insulin (HCC)  -     Hemoglobin A1C; Future; Expected date: 08/03/2021  -     Microalbumin / creatinine urine ratio; Future; Expected date: 08/03/2021    2  Benign essential hypertension  -     Comprehensive metabolic panel; Future; Expected date: 08/03/2021    3  Severe episode of recurrent major depressive disorder, without psychotic features ([address] )    4  FORTINO (generalized anxiety disorder)    5  Mixed hyperlipidemia  -     Lipid Panel with Direct LDL reflex; Future; Expected date: 08/03/2021    6  Chronic right shoulder pain  -     meloxicam (MOBIC) 15 mg tablet; Take 1 tablet (15 mg total) by mouth daily As needed  -     XR shoulder 2+ vw right; Future; Expected date: 02/19/2021  -     Ambulatory referral to Orthopedic Surgery; Future    Pt has lost some weight is doing well with the DM very pleased      BMI Counseling: Body mass index is 27 87 kg/m²  The BMI is above normal  Nutrition recommendations include decreasing portion sizes  Exercise recommendations include moderate physical activity 150 minutes/week  No pharmacotherapy was ordered  There are no Patient Instructions on file for this visit  Return in about 3 months (around 5/19/2021) for Recheck  Subjective:      Patient ID: Daniel Adorno is a 50 y o  female  Chief Complaint   Patient presents with    Follow-up     lab work  Jimbo Boothe       Pt is here today to follow up labs  No CP, no SOB  Pt had labs for her DM    Pt states she has a queastion about her arm, states her arm rt side has been bothering her for 4-5 months  States she had bursitis in the past, its her upper arm  Sometimes it can be really bad      Diabetes:  Taking medications regularly - yes no: yes  Polyuria- yes no: no  Polydypsia-yes no: no  Hypoglycemia -yes no: no  Chest pain- yes no: no  Shortness of breath- yes no: no      The following portions of the patient's history were reviewed and updated as appropriate: allergies, current medications, past family history, past medical history, past social history, past surgical history and problem list     Review of Systems   Constitutional: Negative  Negative for activity change, appetite change, chills, diaphoresis and fatigue  HENT: Negative  Negative for dental problem, ear pain, sinus pressure and sore throat  Eyes: Negative  Negative for photophobia, pain, discharge, redness, itching and visual disturbance  Respiratory: Negative for apnea and chest tightness  Cardiovascular: Negative  Negative for chest pain, palpitations and leg swelling  Gastrointestinal: Negative  Negative for abdominal distention, abdominal pain, constipation and diarrhea  Endocrine: Negative  Negative for cold intolerance and heat intolerance  Genitourinary: Negative  Negative for difficulty urinating and dyspareunia  Musculoskeletal: Positive for arthralgias  Negative for back pain  Skin: Negative  Allergic/Immunologic: Negative for environmental allergies  Neurological: Negative  Negative for dizziness  Psychiatric/Behavioral: Negative  Negative for agitation  Current Outpatient Medications   Medication Sig Dispense Refill    aspirin (ECOTRIN LOW STRENGTH) 81 mg EC tablet Take 1 tablet (81 mg total) by mouth daily 1 tablet 0    atorvastatin (LIPITOR) 20 mg tablet TAKE 1 TABLET BY MOUTH  DAILY 90 tablet 3    buPROPion (WELLBUTRIN XL) 300 mg 24 hr tablet Take 300 mg by mouth every morning       cloNIDine (CATAPRES) 0 1 mg tablet Take 1 tablet by mouth daily       Dapagliflozin Propanediol (Farxiga) 10 MG TABS Take 1 tablet (10 mg total) by mouth daily 90 tablet 1    desvenlafaxine (PRISTIQ) 100 mg 24 hr tablet Take 1 tablet (100 mg total) by mouth daily Indications: Major Depressive Disorder   At 9AM 30 tablet 1    fluticasone (FLONASE) 50 mcg/act nasal spray 1 spray into each nostril daily as needed for rhinitis Indications: Hayfever  16 g 0    gabapentin (NEURONTIN) 300 mg capsule Take 3 capsules by mouth daily       glipiZIDE (GLUCOTROL XL) 10 mg 24 hr tablet TAKE ONE TABLET BY MOUTH EVERY DAY (GENERIC FOR GLUCOTROL XL) 90 tablet 0    hydrOXYzine HCL (ATARAX) 50 mg tablet Take 2 tablets by mouth daily        Jentadueto 2 5-1000 MG TABS TAKE ONE TABLET BY MOUTH TWICE A DAY 90 tablet 1    metoprolol succinate (TOPROL-XL) 50 mg 24 hr tablet TAKE 1 TABLET BY MOUTH  DAILY AT 9AM 90 tablet 3    Multiple Vitamin (MULTIVITAMINS PO) Take 1 tablet by mouth daily       ramipril (ALTACE) 1 25 mg capsule TAKE 1 CAPSULE BY MOUTH  DAILY 90 capsule 3    Sharobel 0 35 MG tablet TAKE ONE TABLET BY MOUTH EVERY DAY 28 tablet 11    traZODone (DESYREL) 100 mg tablet Take 100 mg by mouth daily      Vraylar 4 5 MG capsule daily      meloxicam (MOBIC) 15 mg tablet Take 1 tablet (15 mg total) by mouth daily As needed 90 tablet 1    VRAYLAR 1 5 MG capsule Take 1 5 mg by mouth daily       Vraylar 3 MG capsule Take 3 mg by mouth daily       No current facility-administered medications for this visit  Objective:    /66   Pulse 83   Temp (!) 97 4 °F (36 3 °C)   Resp 18   Ht 4' 11" (1 499 m)   Wt 62 6 kg (138 lb)   LMP 02/15/2021 (Exact Date)   SpO2 98%   BMI 27 87 kg/m²        Physical Exam  Vitals signs and nursing note reviewed  Constitutional:       General: She is not in acute distress  Appearance: She is well-developed  She is not diaphoretic  HENT:      Head: Normocephalic and atraumatic  Right Ear: External ear normal       Left Ear: External ear normal       Nose: Nose normal       Mouth/Throat:      Pharynx: No oropharyngeal exudate  Eyes:      General: No scleral icterus  Right eye: No discharge  Left eye: No discharge  Pupils: Pupils are equal, round, and reactive to light  Neck:      Thyroid: No thyromegaly  Cardiovascular:      Rate and Rhythm: Normal rate  Heart sounds: Normal heart sounds  No murmur  Pulmonary:      Effort: Pulmonary effort is normal  No respiratory distress  Breath sounds: Normal breath sounds  No wheezing  Abdominal:      General: Bowel sounds are normal  There is no distension  Palpations: Abdomen is soft  There is no mass  Tenderness: There is no abdominal tenderness  There is no guarding or rebound  Musculoskeletal: Normal range of motion  Comments: bicipital groove is tender  rom testing hurts     Skin:     General: Skin is warm and dry  Findings: No erythema or rash  Neurological:      Mental Status: She is alert        Coordination: Coordination normal       Deep Tendon Reflexes: Reflexes normal    Psychiatric:         Behavior: Behavior normal               Recent Results (from the past 672 hour(s))   Comprehensive metabolic panel    Collection Time: 01/25/21 10:53 AM   Result Value Ref Range    Glucose, Random 161 (H) 65 - 99 mg/dL    BUN 15 7 - 25 mg/dL    Creatinine 0 73 0 50 - 1 10 mg/dL    eGFR Non  98 > OR = 60 mL/min/1 73m2    eGFR  114 > OR = 60 mL/min/1 73m2    SL AMB BUN/CREATININE RATIO NOT APPLICABLE 6 - 22 (calc)    Sodium 137 135 - 146 mmol/L    Potassium 3 9 3 5 - 5 3 mmol/L    Chloride 101 98 - 110 mmol/L    CO2 27 20 - 32 mmol/L    Calcium 8 7 8 6 - 10 2 mg/dL    Protein, Total 6 5 6 1 - 8 1 g/dL    Albumin 4 2 3 6 - 5 1 g/dL    Globulin 2 3 1 9 - 3 7 g/dL (calc)    Albumin/Globulin Ratio 1 8 1 0 - 2 5 (calc)    TOTAL BILIRUBIN 0 8 0 2 - 1 2 mg/dL    Alkaline Phosphatase 56 31 - 125 U/L    AST 16 10 - 35 U/L    ALT 12 6 - 29 U/L   Hemoglobin A1c (w/out EAG)    Collection Time: 01/25/21 10:53 AM   Result Value Ref Range    Hemoglobin A1C 7 5 (H) <5 7 % of total Hgb   Urine culture    Collection Time: 01/25/21 10:56 AM   Result Value Ref Range    Urine Culture Result           Andrzej Murugia DO

## 2021-02-22 NOTE — RESULT ENCOUNTER NOTE
X ray of the shoulder is acceptable  Advise the [facility] as ordered    Call if no improvment

## 2021-06-10 DIAGNOSIS — E11.9 TYPE 2 DIABETES MELLITUS WITHOUT COMPLICATION, WITHOUT LONG-TERM CURRENT USE OF INSULIN (HCC): ICD-10-CM

## 2021-06-10 RX ORDER — DAPAGLIFLOZIN 10 MG/1
TABLET, FILM COATED ORAL
Qty: 90 TABLET | Refills: 1 | Status: SHIPPED | OUTPATIENT
Start: 2021-06-10 | End: 2022-03-22

## 2021-06-10 RX ORDER — GLIPIZIDE 10 MG/1
TABLET, FILM COATED, EXTENDED RELEASE ORAL
Qty: 90 TABLET | Refills: 0 | Status: SHIPPED | OUTPATIENT
Start: 2021-06-10 | End: 2021-11-05

## 2021-06-18 DIAGNOSIS — E11.9 TYPE 2 DIABETES MELLITUS WITHOUT COMPLICATION, WITHOUT LONG-TERM CURRENT USE OF INSULIN (HCC): ICD-10-CM

## 2021-06-18 RX ORDER — LINAGLIPTIN AND METFORMIN HYDROCHLORIDE 2.5; 1 MG/1; MG/1
TABLET, FILM COATED ORAL
Qty: 90 TABLET | Refills: 1 | Status: SHIPPED | OUTPATIENT
Start: 2021-06-18

## 2021-06-23 ENCOUNTER — TELEPHONE (OUTPATIENT)
Dept: FAMILY MEDICINE CLINIC | Facility: CLINIC | Age: 46
End: 2021-06-23

## 2021-08-02 ENCOUNTER — TELEPHONE (OUTPATIENT)
Dept: FAMILY MEDICINE CLINIC | Facility: CLINIC | Age: 46
End: 2021-08-02

## 2021-08-02 NOTE — TELEPHONE ENCOUNTER
Patient is due for AWV  We spoke to her a couple of months ago for this and she said she already had her AWV done in Feb  That is incorrect, her February visit was for follow up on labs  Patient is overdue for AWV  LMOM for a call back     Blessing Monzon MA

## 2021-08-16 DIAGNOSIS — E11.9 TYPE 2 DIABETES MELLITUS WITHOUT COMPLICATION, WITHOUT LONG-TERM CURRENT USE OF INSULIN (HCC): ICD-10-CM

## 2021-08-16 DIAGNOSIS — E78.2 MIXED HYPERLIPIDEMIA: ICD-10-CM

## 2021-08-17 DIAGNOSIS — I10 HYPERTENSION, UNSPECIFIED TYPE: ICD-10-CM

## 2021-08-17 RX ORDER — METOPROLOL SUCCINATE 50 MG/1
TABLET, EXTENDED RELEASE ORAL
Qty: 90 TABLET | Refills: 1 | Status: SHIPPED | OUTPATIENT
Start: 2021-08-17 | End: 2021-12-17 | Stop reason: SDUPTHER

## 2021-08-17 RX ORDER — ATORVASTATIN CALCIUM 20 MG/1
TABLET, FILM COATED ORAL
Qty: 90 TABLET | Refills: 1 | Status: SHIPPED | OUTPATIENT
Start: 2021-08-17 | End: 2022-02-03

## 2021-09-13 DIAGNOSIS — E11.9 TYPE 2 DIABETES MELLITUS WITHOUT COMPLICATION, WITHOUT LONG-TERM CURRENT USE OF INSULIN (HCC): ICD-10-CM

## 2021-09-13 DIAGNOSIS — I10 BENIGN ESSENTIAL HYPERTENSION: ICD-10-CM

## 2021-09-14 RX ORDER — RAMIPRIL 1.25 MG/1
CAPSULE ORAL
Qty: 90 CAPSULE | Refills: 3 | Status: SHIPPED | OUTPATIENT
Start: 2021-09-14

## 2021-10-25 NOTE — TELEPHONE ENCOUNTER
Left message for pt to call back  Urine culture was negative  She should still take the antibiotics to cover any sinus infection she may have 
Detail Level: Simple
Price (Do Not Change): 0.00
Instructions: This plan will send the code FBSE to the PM system.  DO NOT or CHANGE the price.

## 2021-11-01 ENCOUNTER — TELEPHONE (OUTPATIENT)
Dept: FAMILY MEDICINE CLINIC | Facility: CLINIC | Age: 46
End: 2021-11-01

## 2021-11-30 ENCOUNTER — ANNUAL EXAM (OUTPATIENT)
Dept: OBGYN CLINIC | Facility: CLINIC | Age: 46
End: 2021-11-30
Payer: COMMERCIAL

## 2021-11-30 VITALS — WEIGHT: 125 LBS | BODY MASS INDEX: 25.25 KG/M2

## 2021-11-30 DIAGNOSIS — Z12.31 BREAST CANCER SCREENING BY MAMMOGRAM: ICD-10-CM

## 2021-11-30 DIAGNOSIS — Z01.419 ENCNTR FOR GYN EXAM (GENERAL) (ROUTINE) W/O ABN FINDINGS: Primary | ICD-10-CM

## 2021-11-30 DIAGNOSIS — N92.6 IRREGULAR BLEEDING: ICD-10-CM

## 2021-11-30 PROCEDURE — G0101 CA SCREEN;PELVIC/BREAST EXAM: HCPCS | Performed by: NURSE PRACTITIONER

## 2021-12-03 DIAGNOSIS — N92.6 IRREGULAR MENSES: ICD-10-CM

## 2021-12-07 RX ORDER — NORETHINDRONE 0.35 MG
KIT ORAL
Qty: 28 TABLET | Refills: 11 | Status: SHIPPED | OUTPATIENT
Start: 2021-12-07

## 2021-12-17 ENCOUNTER — OFFICE VISIT (OUTPATIENT)
Dept: FAMILY MEDICINE CLINIC | Facility: CLINIC | Age: 46
End: 2021-12-17
Payer: COMMERCIAL

## 2021-12-17 VITALS
TEMPERATURE: 97.1 F | BODY MASS INDEX: 25.4 KG/M2 | WEIGHT: 126 LBS | RESPIRATION RATE: 16 BRPM | HEIGHT: 59 IN | HEART RATE: 72 BPM | DIASTOLIC BLOOD PRESSURE: 62 MMHG | SYSTOLIC BLOOD PRESSURE: 108 MMHG

## 2021-12-17 DIAGNOSIS — F41.1 GAD (GENERALIZED ANXIETY DISORDER): ICD-10-CM

## 2021-12-17 DIAGNOSIS — E11.9 TYPE 2 DIABETES MELLITUS WITHOUT COMPLICATION, WITHOUT LONG-TERM CURRENT USE OF INSULIN (HCC): ICD-10-CM

## 2021-12-17 DIAGNOSIS — I10 BENIGN ESSENTIAL HYPERTENSION: ICD-10-CM

## 2021-12-17 DIAGNOSIS — E78.2 MIXED HYPERLIPIDEMIA: ICD-10-CM

## 2021-12-17 DIAGNOSIS — E07.9 THYROID DISORDER: ICD-10-CM

## 2021-12-17 DIAGNOSIS — Z12.11 SCREEN FOR COLON CANCER: ICD-10-CM

## 2021-12-17 DIAGNOSIS — Z11.59 NEED FOR HEPATITIS C SCREENING TEST: ICD-10-CM

## 2021-12-17 DIAGNOSIS — I10 HYPERTENSION, UNSPECIFIED TYPE: ICD-10-CM

## 2021-12-17 DIAGNOSIS — F33.2 SEVERE EPISODE OF RECURRENT MAJOR DEPRESSIVE DISORDER, WITHOUT PSYCHOTIC FEATURES (HCC): ICD-10-CM

## 2021-12-17 DIAGNOSIS — Z00.00 MEDICARE ANNUAL WELLNESS VISIT, SUBSEQUENT: Primary | ICD-10-CM

## 2021-12-17 DIAGNOSIS — Z23 NEED FOR VACCINATION: ICD-10-CM

## 2021-12-17 PROBLEM — F32.A MODERATELY SEVERE DEPRESSION: Status: RESOLVED | Noted: 2018-01-12 | Resolved: 2021-12-17

## 2021-12-17 LAB
LEFT EYE DIABETIC RETINOPATHY: NORMAL
LEFT EYE IMAGE QUALITY: NORMAL
LEFT EYE MACULAR EDEMA: NORMAL
LEFT EYE OTHER RETINOPATHY: NORMAL
RIGHT EYE DIABETIC RETINOPATHY: NORMAL
RIGHT EYE IMAGE QUALITY: NORMAL
RIGHT EYE MACULAR EDEMA: NORMAL
RIGHT EYE OTHER RETINOPATHY: NORMAL
SEVERITY (EYE EXAM): NORMAL
SL AMB POCT HEMOGLOBIN AIC: 6.3 (ref ?–6.5)

## 2021-12-17 PROCEDURE — 83036 HEMOGLOBIN GLYCOSYLATED A1C: CPT | Performed by: FAMILY MEDICINE

## 2021-12-17 PROCEDURE — G0009 ADMIN PNEUMOCOCCAL VACCINE: HCPCS

## 2021-12-17 PROCEDURE — 92250 FUNDUS PHOTOGRAPHY W/I&R: CPT | Performed by: FAMILY MEDICINE

## 2021-12-17 PROCEDURE — 3074F SYST BP LT 130 MM HG: CPT | Performed by: FAMILY MEDICINE

## 2021-12-17 PROCEDURE — 3725F SCREEN DEPRESSION PERFORMED: CPT | Performed by: FAMILY MEDICINE

## 2021-12-17 PROCEDURE — 3078F DIAST BP <80 MM HG: CPT | Performed by: FAMILY MEDICINE

## 2021-12-17 PROCEDURE — 3044F HG A1C LEVEL LT 7.0%: CPT | Performed by: FAMILY MEDICINE

## 2021-12-17 PROCEDURE — 3008F BODY MASS INDEX DOCD: CPT | Performed by: FAMILY MEDICINE

## 2021-12-17 PROCEDURE — 1036F TOBACCO NON-USER: CPT | Performed by: FAMILY MEDICINE

## 2021-12-17 PROCEDURE — 2025F 7 FLD RTA PHOTO W/O RTNOPTHY: CPT | Performed by: FAMILY MEDICINE

## 2021-12-17 PROCEDURE — 90732 PPSV23 VACC 2 YRS+ SUBQ/IM: CPT

## 2021-12-17 PROCEDURE — G0439 PPPS, SUBSEQ VISIT: HCPCS | Performed by: FAMILY MEDICINE

## 2021-12-17 RX ORDER — METOPROLOL SUCCINATE 25 MG/1
25 TABLET, EXTENDED RELEASE ORAL DAILY
Qty: 90 TABLET | Refills: 1 | Status: SHIPPED | OUTPATIENT
Start: 2021-12-17

## 2021-12-17 RX ORDER — CARIPRAZINE 6 MG/1
CAPSULE, GELATIN COATED ORAL EVERY OTHER DAY
COMMUNITY
Start: 2021-11-30

## 2021-12-28 DIAGNOSIS — E11.9 TYPE 2 DIABETES MELLITUS WITHOUT COMPLICATION, WITHOUT LONG-TERM CURRENT USE OF INSULIN (HCC): ICD-10-CM

## 2021-12-28 RX ORDER — GLIPIZIDE 10 MG/1
TABLET, FILM COATED, EXTENDED RELEASE ORAL
Qty: 30 TABLET | Refills: 0 | Status: SHIPPED | OUTPATIENT
Start: 2021-12-28 | End: 2022-02-18

## 2022-02-02 DIAGNOSIS — E78.2 MIXED HYPERLIPIDEMIA: ICD-10-CM

## 2022-02-02 DIAGNOSIS — E11.9 TYPE 2 DIABETES MELLITUS WITHOUT COMPLICATION, WITHOUT LONG-TERM CURRENT USE OF INSULIN (HCC): ICD-10-CM

## 2022-02-03 RX ORDER — ATORVASTATIN CALCIUM 20 MG/1
TABLET, FILM COATED ORAL
Qty: 90 TABLET | Refills: 3 | Status: SHIPPED | OUTPATIENT
Start: 2022-02-03

## 2022-02-10 LAB
ALBUMIN SERPL-MCNC: 4.3 G/DL (ref 3.6–5.1)
ALBUMIN/CREAT UR: 7 MCG/MG CREAT
ALBUMIN/GLOB SERPL: 1.7 (CALC) (ref 1–2.5)
ALP SERPL-CCNC: 54 U/L (ref 31–125)
ALT SERPL-CCNC: 9 U/L (ref 6–29)
AST SERPL-CCNC: 13 U/L (ref 10–35)
BILIRUB SERPL-MCNC: 0.7 MG/DL (ref 0.2–1.2)
BUN SERPL-MCNC: 15 MG/DL (ref 7–25)
BUN/CREAT SERPL: ABNORMAL (CALC) (ref 6–22)
CALCIUM SERPL-MCNC: 8.9 MG/DL (ref 8.6–10.2)
CHLORIDE SERPL-SCNC: 103 MMOL/L (ref 98–110)
CHOLEST SERPL-MCNC: 110 MG/DL
CHOLEST/HDLC SERPL: 2.8 (CALC)
CO2 SERPL-SCNC: 27 MMOL/L (ref 20–32)
CREAT SERPL-MCNC: 0.78 MG/DL (ref 0.5–1.1)
CREAT UR-MCNC: 27 MG/DL (ref 20–275)
GLOBULIN SER CALC-MCNC: 2.6 G/DL (CALC) (ref 1.9–3.7)
GLUCOSE SERPL-MCNC: 144 MG/DL (ref 65–99)
HBA1C MFR BLD: 6.2 % OF TOTAL HGB
HCV AB S/CO SERPL IA: 0.03
HCV AB SERPL QL IA: NORMAL
HDLC SERPL-MCNC: 40 MG/DL
LDLC SERPL CALC-MCNC: 52 MG/DL (CALC)
MICROALBUMIN UR-MCNC: 0.2 MG/DL
NONHDLC SERPL-MCNC: 70 MG/DL (CALC)
POTASSIUM SERPL-SCNC: 3.8 MMOL/L (ref 3.5–5.3)
PROT SERPL-MCNC: 6.9 G/DL (ref 6.1–8.1)
SL AMB EGFR AFRICAN AMERICAN: 104 ML/MIN/1.73M2
SL AMB EGFR NON AFRICAN AMERICAN: 90 ML/MIN/1.73M2
SODIUM SERPL-SCNC: 139 MMOL/L (ref 135–146)
TRIGL SERPL-MCNC: 94 MG/DL
TSH SERPL-ACNC: 1.22 MIU/L

## 2022-02-18 DIAGNOSIS — E11.9 TYPE 2 DIABETES MELLITUS WITHOUT COMPLICATION, WITHOUT LONG-TERM CURRENT USE OF INSULIN (HCC): ICD-10-CM

## 2022-02-18 RX ORDER — GLIPIZIDE 10 MG/1
TABLET, FILM COATED, EXTENDED RELEASE ORAL
Qty: 30 TABLET | Refills: 0 | Status: SHIPPED | OUTPATIENT
Start: 2022-02-18 | End: 2022-03-22 | Stop reason: SDUPTHER

## 2022-03-15 ENCOUNTER — TELEPHONE (OUTPATIENT)
Dept: FAMILY MEDICINE CLINIC | Facility: CLINIC | Age: 47
End: 2022-03-15

## 2022-03-22 ENCOUNTER — OFFICE VISIT (OUTPATIENT)
Dept: FAMILY MEDICINE CLINIC | Facility: CLINIC | Age: 47
End: 2022-03-22
Payer: COMMERCIAL

## 2022-03-22 VITALS
SYSTOLIC BLOOD PRESSURE: 102 MMHG | HEART RATE: 90 BPM | WEIGHT: 124 LBS | BODY MASS INDEX: 25 KG/M2 | OXYGEN SATURATION: 96 % | RESPIRATION RATE: 16 BRPM | HEIGHT: 59 IN | DIASTOLIC BLOOD PRESSURE: 66 MMHG | TEMPERATURE: 97.5 F

## 2022-03-22 DIAGNOSIS — E78.2 MIXED HYPERLIPIDEMIA: ICD-10-CM

## 2022-03-22 DIAGNOSIS — I10 HYPERTENSION, UNSPECIFIED TYPE: ICD-10-CM

## 2022-03-22 DIAGNOSIS — E11.9 TYPE 2 DIABETES MELLITUS WITHOUT COMPLICATION, WITHOUT LONG-TERM CURRENT USE OF INSULIN (HCC): Primary | ICD-10-CM

## 2022-03-22 DIAGNOSIS — H02.9 LESION OF RIGHT LOWER EYELID: ICD-10-CM

## 2022-03-22 DIAGNOSIS — E07.9 THYROID DISORDER: ICD-10-CM

## 2022-03-22 DIAGNOSIS — I10 BENIGN ESSENTIAL HYPERTENSION: ICD-10-CM

## 2022-03-22 DIAGNOSIS — F33.2 SEVERE EPISODE OF RECURRENT MAJOR DEPRESSIVE DISORDER, WITHOUT PSYCHOTIC FEATURES (HCC): ICD-10-CM

## 2022-03-22 DIAGNOSIS — Z12.31 SCREENING MAMMOGRAM FOR HIGH-RISK PATIENT: ICD-10-CM

## 2022-03-22 DIAGNOSIS — E11.9 TYPE 2 DIABETES MELLITUS WITHOUT COMPLICATION, WITHOUT LONG-TERM CURRENT USE OF INSULIN (HCC): ICD-10-CM

## 2022-03-22 PROCEDURE — 99214 OFFICE O/P EST MOD 30 MIN: CPT | Performed by: FAMILY MEDICINE

## 2022-03-22 PROCEDURE — 3078F DIAST BP <80 MM HG: CPT | Performed by: FAMILY MEDICINE

## 2022-03-22 PROCEDURE — 3074F SYST BP LT 130 MM HG: CPT | Performed by: FAMILY MEDICINE

## 2022-03-22 PROCEDURE — 3008F BODY MASS INDEX DOCD: CPT | Performed by: FAMILY MEDICINE

## 2022-03-22 PROCEDURE — 1036F TOBACCO NON-USER: CPT | Performed by: FAMILY MEDICINE

## 2022-03-22 RX ORDER — CETIRIZINE HYDROCHLORIDE 10 MG/1
CAPSULE, LIQUID FILLED ORAL
COMMUNITY

## 2022-03-22 RX ORDER — DAPAGLIFLOZIN 10 MG/1
TABLET, FILM COATED ORAL
Qty: 90 TABLET | Refills: 1 | Status: SHIPPED | OUTPATIENT
Start: 2022-03-22

## 2022-03-22 RX ORDER — GLIPIZIDE 5 MG/1
5 TABLET, FILM COATED, EXTENDED RELEASE ORAL DAILY
Qty: 90 TABLET | Refills: 1 | Status: SHIPPED | OUTPATIENT
Start: 2022-03-22

## 2022-03-22 NOTE — PROGRESS NOTES
Assessment/Plan:    1  Type 2 diabetes mellitus without complication, without long-term current use of insulin (HCC)  -     Hemoglobin A1C; Future; Expected date: 07/20/2022  -     glipiZIDE (GLUCOTROL XL) 5 mg 24 hr tablet; Take 1 tablet (5 mg total) by mouth daily    2  Benign essential hypertension  -     Comprehensive metabolic panel; Future; Expected date: 07/20/2022    3  Thyroid disorder  -     TSH, 3rd generation with Free T4 reflex; Future; Expected date: 07/20/2022    4  Mixed hyperlipidemia    5  Severe episode of recurrent major depressive disorder, without psychotic features ([facility] [address] )    6  Screening mammogram for high-risk patient  -     Mammo screening bilateral w 3d & cad; Future; Expected date: 03/22/2022    7  Hypertension, unspecified type    8  Lesion of right lower eyelid  -     Ambulatory Referral to Dermatology; Future            There are no Patient Instructions on file for this visit  Return in 4 months (on 7/22/2022) for Diabetes follow-up  Subjective:      Patient ID: Heaven Vasquez is a 48 y o  female  Chief Complaint   Patient presents with    Medication Management     sas/cma       Pt is here for her follow up  Had labs    Pt has a lesion on her rt lower lid she would like removed      The following portions of the patient's history were reviewed and updated as appropriate: allergies, current medications, past family history, past medical history, past social history, past surgical history and problem list     Review of Systems   Constitutional: Negative  Negative for activity change, appetite change, chills, diaphoresis and fatigue  HENT: Negative  Negative for dental problem, ear pain, sinus pressure and sore throat  Eyes: Negative  Negative for photophobia, pain, discharge, redness, itching and visual disturbance  Respiratory: Negative for apnea and chest tightness  Cardiovascular: Negative  Negative for chest pain, palpitations and leg swelling  Gastrointestinal: Negative  Negative for abdominal distention, abdominal pain, constipation and diarrhea  Endocrine: Negative  Negative for cold intolerance and heat intolerance  Genitourinary: Negative  Negative for difficulty urinating and dyspareunia  Musculoskeletal: Negative  Negative for arthralgias and back pain  Skin: Negative  Allergic/Immunologic: Negative for environmental allergies  Neurological: Negative  Negative for dizziness  Psychiatric/Behavioral: Negative  Negative for agitation  Current Outpatient Medications   Medication Sig Dispense Refill    albuterol (PROVENTIL HFA,VENTOLIN HFA) 90 mcg/act inhaler Inhale 2 puffs every 6 (six) hours as needed for wheezing      aspirin (ECOTRIN LOW STRENGTH) 81 mg EC tablet Take 1 tablet (81 mg total) by mouth daily 1 tablet 0    atorvastatin (LIPITOR) 20 mg tablet TAKE 1 TABLET BY MOUTH  DAILY 90 tablet 3    buPROPion (WELLBUTRIN XL) 300 mg 24 hr tablet Take 300 mg by mouth every morning       Cetirizine HCl (ZyrTEC Allergy) 10 MG CAPS Take by mouth      cloNIDine (CATAPRES) 0 1 mg tablet Take 1 tablet by mouth daily       desvenlafaxine (PRISTIQ) 100 mg 24 hr tablet Take 1 tablet (100 mg total) by mouth daily Indications: Major Depressive Disorder  At [address] [place] [address] (Patient taking differently: Take 50 mg by mouth daily At 9AM ) 30 tablet 1    Farxiga 10 MG TABS TAKE ONE TABLET BY MOUTH EVERY DAY 90 tablet 1    fluticasone (FLONASE) 50 mcg/act nasal spray 1 spray into each nostril daily as needed for rhinitis Indications: Hayfever   16 g 0    gabapentin (NEURONTIN) 300 mg capsule Take 3 capsules by mouth daily       glipiZIDE (GLUCOTROL XL) 5 mg 24 hr tablet Take 1 tablet (5 mg total) by mouth daily 90 tablet 1    Jentadueto 2 5-1000 MG TABS TAKE ONE TABLET BY MOUTH TWICE A DAY 90 tablet 1    metoprolol succinate (TOPROL-XL) 25 mg 24 hr tablet Take 1 tablet (25 mg total) by mouth daily At 9 AM 90 tablet 1    Multiple Vitamin (MULTIVITAMINS PO) Take 1 tablet by mouth daily       ramipril (ALTACE) 1 25 mg capsule TAKE 1 CAPSULE BY MOUTH  DAILY 90 capsule 3    Sharobel 0 35 MG tablet TAKE ONE TABLET BY MOUTH EVERY DAY 28 tablet 11    traZODone (DESYREL) 100 mg tablet Take 100 mg by mouth daily      Vraylar 6 MG capsule every other day         No current facility-administered medications for this visit  Objective:    /66   Pulse 90   Temp 97 5 °F (36 4 °C)   Resp 16   Ht 4' 11" (1 499 m)   Wt 56 2 kg (124 lb)   LMP 03/09/2022   SpO2 96%   BMI 25 04 kg/m²        Physical Exam  Vitals and nursing note reviewed  Constitutional:       General: She is not in acute distress  Appearance: She is well-developed  She is not diaphoretic  HENT:      Head: Normocephalic and atraumatic  Right Ear: External ear normal       Left Ear: External ear normal       Nose: Nose normal       Mouth/Throat:      Pharynx: No oropharyngeal exudate  Eyes:      General: No scleral icterus  Right eye: No discharge  Left eye: No discharge  Pupils: Pupils are equal, round, and reactive to light  Neck:      Thyroid: No thyromegaly  Cardiovascular:      Rate and Rhythm: Normal rate  Heart sounds: Normal heart sounds  No murmur heard  Pulmonary:      Effort: Pulmonary effort is normal  No respiratory distress  Breath sounds: Normal breath sounds  No wheezing  Abdominal:      General: Bowel sounds are normal  There is no distension  Palpations: Abdomen is soft  There is no mass  Tenderness: There is no abdominal tenderness  There is no guarding or rebound  Musculoskeletal:         General: Normal range of motion  Skin:     General: Skin is warm and dry  Findings: No erythema or rash  Neurological:      Mental Status: She is alert        Coordination: Coordination normal       Deep Tendon Reflexes: Reflexes normal    Psychiatric:         Behavior: Behavior normal  Recent Results (from the past 2016 hour(s))   Lipid Panel with Direct LDL reflex    Collection Time: 02/10/22  9:09 AM   Result Value Ref Range    Total Cholesterol 110 <200 mg/dL    HDL 40 (L) > OR = 50 mg/dL    Triglycerides 94 <150 mg/dL    LDL Calculated 52 mg/dL (calc)    Chol HDLC Ratio 2 8 <5 0 (calc)    Non-HDL Cholesterol 70 <130 mg/dL (calc)   Microalbumin, Random Urine (W/Creatinine)    Collection Time: 02/10/22  9:09 AM   Result Value Ref Range    Creatinine, Urine 27 20 - 275 mg/dL    Microalbum  ,U,Random 0 2 See Note: mg/dL    Microalb/Creat Ratio 7 <30 mcg/mg creat   Comprehensive metabolic panel    Collection Time: 02/10/22  9:09 AM   Result Value Ref Range    Glucose, Random 144 (H) 65 - 99 mg/dL    BUN 15 7 - 25 mg/dL    Creatinine 0 78 0 50 - 1 10 mg/dL    eGFR Non  90 > OR = 60 mL/min/1 73m2    eGFR  104 > OR = 60 mL/min/1 73m2    SL AMB BUN/CREATININE RATIO NOT APPLICABLE 6 - 22 (calc)    Sodium 139 135 - 146 mmol/L    Potassium 3 8 3 5 - 5 3 mmol/L    Chloride 103 98 - 110 mmol/L    CO2 27 20 - 32 mmol/L    Calcium 8 9 8 6 - 10 2 mg/dL    Protein, Total 6 9 6 1 - 8 1 g/dL    Albumin 4 3 3 6 - 5 1 g/dL    Globulin 2 6 1 9 - 3 7 g/dL (calc)    Albumin/Globulin Ratio 1 7 1 0 - 2 5 (calc)    TOTAL BILIRUBIN 0 7 0 2 - 1 2 mg/dL    Alkaline Phosphatase 54 31 - 125 U/L    AST 13 10 - 35 U/L    ALT 9 6 - 29 U/L   Hepatitis C Ab W/Refl To HCV RNA, Qn, PCR    Collection Time: 02/10/22  9:09 AM   Result Value Ref Range    HEP C AB NON-REACTIVE NON-REACTIVE    Signal to Cut-Off 0 03 <1 00   TSH, 3rd generation    Collection Time: 02/10/22  9:09 AM   Result Value Ref Range    TSH 1 22 mIU/L   Hemoglobin A1c (w/out EAG)    Collection Time: 02/10/22  9:09 AM   Result Value Ref Range    Hemoglobin A1C 6 2 (H) <5 7 % of total Hgb         Ulysees Dragon, DO  Depression Screening Follow-up Plan: Patient's depression screening was positive with a PHQ-2 score of    Their PHQ-9 score was   Patient assessed for underlying major depression  They have no active suicidal ideations  Brief counseling provided and recommend additional follow-up/re-evaluation next office visit

## 2022-04-19 ENCOUNTER — HOSPITAL ENCOUNTER (OUTPATIENT)
Dept: RADIOLOGY | Facility: HOSPITAL | Age: 47
Discharge: HOME | End: 2022-04-19
Attending: FAMILY MEDICINE
Payer: COMMERCIAL

## 2022-04-19 VITALS — HEIGHT: 60 IN | BODY MASS INDEX: 24.35 KG/M2 | WEIGHT: 124 LBS

## 2022-04-19 DIAGNOSIS — Z12.31 SCREENING MAMMOGRAM FOR HIGH-RISK PATIENT: ICD-10-CM

## 2022-04-19 PROCEDURE — 77067 SCR MAMMO BI INCL CAD: CPT

## 2022-04-19 PROCEDURE — 77063 BREAST TOMOSYNTHESIS BI: CPT

## 2022-04-30 ENCOUNTER — OFFICE VISIT (OUTPATIENT)
Dept: FAMILY MEDICINE CLINIC | Facility: CLINIC | Age: 47
End: 2022-04-30
Payer: COMMERCIAL

## 2022-04-30 VITALS
TEMPERATURE: 97.6 F | SYSTOLIC BLOOD PRESSURE: 98 MMHG | DIASTOLIC BLOOD PRESSURE: 70 MMHG | HEART RATE: 91 BPM | WEIGHT: 125 LBS | OXYGEN SATURATION: 96 % | BODY MASS INDEX: 24.54 KG/M2 | RESPIRATION RATE: 16 BRPM | HEIGHT: 60 IN

## 2022-04-30 DIAGNOSIS — E78.2 MIXED HYPERLIPIDEMIA: ICD-10-CM

## 2022-04-30 DIAGNOSIS — F33.2 SEVERE EPISODE OF RECURRENT MAJOR DEPRESSIVE DISORDER, WITHOUT PSYCHOTIC FEATURES (HCC): ICD-10-CM

## 2022-04-30 DIAGNOSIS — I10 BENIGN ESSENTIAL HYPERTENSION: ICD-10-CM

## 2022-04-30 DIAGNOSIS — N30.00 ACUTE CYSTITIS WITHOUT HEMATURIA: Primary | ICD-10-CM

## 2022-04-30 DIAGNOSIS — E11.9 TYPE 2 DIABETES MELLITUS WITHOUT COMPLICATION, WITHOUT LONG-TERM CURRENT USE OF INSULIN (HCC): ICD-10-CM

## 2022-04-30 LAB
SL AMB  POCT GLUCOSE, UA: 1000
SL AMB LEUKOCYTE ESTERASE,UA: NORMAL
SL AMB POCT BILIRUBIN,UA: NORMAL
SL AMB POCT BLOOD,UA: NORMAL
SL AMB POCT CLARITY,UA: NORMAL
SL AMB POCT COLOR,UA: NORMAL
SL AMB POCT KETONES,UA: NORMAL
SL AMB POCT NITRITE,UA: NORMAL
SL AMB POCT PH,UA: 5.5
SL AMB POCT SPECIFIC GRAVITY,UA: 1.02
SL AMB POCT URINE PROTEIN: 100
SL AMB POCT UROBILINOGEN: 0.2

## 2022-04-30 PROCEDURE — 81003 URINALYSIS AUTO W/O SCOPE: CPT | Performed by: NURSE PRACTITIONER

## 2022-04-30 PROCEDURE — 3008F BODY MASS INDEX DOCD: CPT | Performed by: FAMILY MEDICINE

## 2022-04-30 PROCEDURE — 99214 OFFICE O/P EST MOD 30 MIN: CPT | Performed by: NURSE PRACTITIONER

## 2022-04-30 RX ORDER — SULFAMETHOXAZOLE AND TRIMETHOPRIM 800; 160 MG/1; MG/1
1 TABLET ORAL EVERY 12 HOURS SCHEDULED
Qty: 14 TABLET | Refills: 0 | Status: SHIPPED | OUTPATIENT
Start: 2022-04-30 | End: 2022-05-07

## 2022-04-30 NOTE — PROGRESS NOTES
Assessment/Plan:    1  Acute cystitis without hematuria  -     POCT urine dip auto non-scope  -     Urine culture  -     sulfamethoxazole-trimethoprim (BACTRIM DS) 800-160 mg per tablet; Take 1 tablet by mouth every 12 (twelve) hours for 7 days    2  Benign essential hypertension  Comments:  stable with current regimen    3  Mixed hyperlipidemia  Comments:  complaint with statin and tolerating it well    4  Type 2 diabetes mellitus without complication, without long-term current use of insulin ([facility])  Comments:  HbA1c at goal and managed by PCP    5  Severe episode of recurrent major depressive disorder, without psychotic features ([facility] [address] )  Comments:  managed by psychatrist      Urine dip results discussed with patient  I explained to the patient that this means she might have or does not have UTI and will confirm with urine culture  Explained about all possible complications of possible UTI like sepsis  Shared decision making to start empirical therapy with antibiotics for possible UTI and will call with urine culture results  If urine culture results will be negative, patient will stop antibiotic  Patient Instructions: Take antibiotics until finished with food  Drink plenty of fluids  Follow up advised for persistent urinary symptoms or if you develop flank pain, fevers, nausea, or vomiting  Please call the office if symptoms do not improve after completion of the antibiotics  Supportive care discussed and advised  Advised to RTO for any worsening and no improvement  Follow up for no improvement and worsening of conditions  Patient advised and educated when to see immediate medical care  Return if symptoms worsen or fail to improve  No future appointments  Subjective:      Patient ID: Baldomero Cazares is a 48 y o  female      Chief Complaint   Patient presents with    Urinary Tract Infection     mfcma         Vitals:  BP 98/70   Pulse 91   Temp 97 6 °F (36 4 °C)   Resp 16   Ht 4' 11 5" (1 511 m)   Wt 56 7 kg (125 lb)   LMP 03/09/2022   SpO2 96%   BMI 24 82 kg/m²     HPI  Patient stated that started with discomfort with urination and noticed blood with urine yesterday  Denies fever, chills and sob  Not taking any OTC for symptoms  Complaint with chronic medications for chronic illnesses and tolerating it well  Does not need refill for medications  The following portions of the patient's history were reviewed and updated as appropriate: allergies, current medications, past family history, past medical history, past social history, past surgical history and problem list       Review of Systems   Constitutional: Negative for chills, diaphoresis, fatigue, fever and unexpected weight change  Respiratory: Negative  Cardiovascular: Negative  Gastrointestinal: Negative for abdominal pain, nausea and vomiting  Genitourinary: Positive for dysuria and hematuria  Negative for decreased urine volume, difficulty urinating, flank pain, frequency, genital sores and urgency  Skin: Negative  Neurological: Negative for dizziness and headaches  Objective:    Social History     Tobacco Use   Smoking Status Never Smoker   Smokeless Tobacco Never Used       Allergies:    Allergies   Allergen Reactions    Oxycodone-Acetaminophen Nausea Only and Vomiting     Reaction Date: 13Dec2006;          Current Outpatient Medications   Medication Sig Dispense Refill    albuterol (PROVENTIL HFA,VENTOLIN HFA) 90 mcg/act inhaler Inhale 2 puffs every 6 (six) hours as needed for wheezing      aspirin (ECOTRIN LOW STRENGTH) 81 mg EC tablet Take 1 tablet (81 mg total) by mouth daily 1 tablet 0    atorvastatin (LIPITOR) 20 mg tablet TAKE 1 TABLET BY MOUTH  DAILY 90 tablet 3    buPROPion (WELLBUTRIN XL) 300 mg 24 hr tablet Take 300 mg by mouth every morning       Cetirizine HCl (ZyrTEC Allergy) 10 MG CAPS Take by mouth      cloNIDine (CATAPRES) 0 1 mg tablet Take 1 tablet by mouth daily       desvenlafaxine (PRISTIQ) 100 mg 24 hr tablet Take 1 tablet (100 mg total) by mouth daily Indications: Major Depressive Disorder  At [address] [place] [address] (Patient taking differently: Take 50 mg by mouth daily At 9AM ) 30 tablet 1    Farxiga 10 MG TABS TAKE ONE TABLET BY MOUTH EVERY DAY 90 tablet 1    fluticasone (FLONASE) 50 mcg/act nasal spray 1 spray into each nostril daily as needed for rhinitis Indications: Hayfever  16 g 0    gabapentin (NEURONTIN) 300 mg capsule Take 3 capsules by mouth daily       glipiZIDE (GLUCOTROL XL) 5 mg 24 hr tablet Take 1 tablet (5 mg total) by mouth daily 90 tablet 1    Jentadueto 2 5-1000 MG TABS TAKE ONE TABLET BY MOUTH TWICE A DAY 90 tablet 1    metoprolol succinate (TOPROL-XL) 25 mg 24 hr tablet Take 1 tablet (25 mg total) by mouth daily At 9 AM 90 tablet 1    Multiple Vitamin (MULTIVITAMINS PO) Take 1 tablet by mouth daily       ramipril (ALTACE) 1 25 mg capsule TAKE 1 CAPSULE BY MOUTH  DAILY 90 capsule 3    Sharobel 0 35 MG tablet TAKE ONE TABLET BY MOUTH EVERY DAY 28 tablet 11    traZODone (DESYREL) 100 mg tablet Take 100 mg by mouth daily      Vraylar 6 MG capsule every other day        sulfamethoxazole-trimethoprim (BACTRIM DS) 800-160 mg per tablet Take 1 tablet by mouth every 12 (twelve) hours for 7 days 14 tablet 0     No current facility-administered medications for this visit  Physical Exam  Vitals reviewed  Constitutional:       Appearance: She is well-developed  Cardiovascular:      Rate and Rhythm: Normal rate and regular rhythm  Heart sounds: Normal heart sounds  Pulmonary:      Effort: Pulmonary effort is normal       Breath sounds: Normal breath sounds  Abdominal:      General: Bowel sounds are normal       Palpations: Abdomen is soft  Tenderness: There is no abdominal tenderness  Skin:     General: Skin is warm and dry  Neurological:      Mental Status: She is alert and oriented to person, place, and time     Psychiatric:         Behavior: Behavior normal          Thought Content:  Thought content normal          Judgment: Judgment normal          Recent Results (from the past 24 hour(s))   POCT urine dip auto non-scope    Collection Time: 04/30/22 11:36 AM   Result Value Ref Range     COLOR,UA brown     CLARITY,UA turbid     SPECIFIC GRAVITY,UA 1 025      PH,UA 5 5     LEUKOCYTE ESTERASE,UA trace     NITRITE,UA pos     GLUCOSE, UA 1,000     KETONES,UA trace     BILIRUBIN,UA neg     BLOOD,UA large     POCT URINE PROTEIN 100     SL AMB POCT UROBILINOGEN 0 2                    EBENEZER Perdomo

## 2022-05-03 LAB
BACTERIA UR CULT: ABNORMAL
Lab: ABNORMAL
SL AMB ANTIMICROBIAL SUSCEPTIBILITY: ABNORMAL

## 2022-08-13 ENCOUNTER — OFFICE VISIT (OUTPATIENT)
Dept: URGENT CARE | Facility: CLINIC | Age: 47
End: 2022-08-13
Payer: COMMERCIAL

## 2022-08-13 VITALS
RESPIRATION RATE: 18 BRPM | DIASTOLIC BLOOD PRESSURE: 72 MMHG | OXYGEN SATURATION: 99 % | HEIGHT: 60 IN | WEIGHT: 123.4 LBS | TEMPERATURE: 98.1 F | SYSTOLIC BLOOD PRESSURE: 119 MMHG | BODY MASS INDEX: 24.23 KG/M2 | HEART RATE: 97 BPM

## 2022-08-13 DIAGNOSIS — E11.9 TYPE 2 DIABETES MELLITUS WITHOUT COMPLICATION, WITHOUT LONG-TERM CURRENT USE OF INSULIN (HCC): ICD-10-CM

## 2022-08-13 DIAGNOSIS — R31.9 URINARY TRACT INFECTION WITH HEMATURIA, SITE UNSPECIFIED: Primary | ICD-10-CM

## 2022-08-13 DIAGNOSIS — N39.0 URINARY TRACT INFECTION WITH HEMATURIA, SITE UNSPECIFIED: Primary | ICD-10-CM

## 2022-08-13 LAB
SL AMB  POCT GLUCOSE, UA: 500
SL AMB LEUKOCYTE ESTERASE,UA: ABNORMAL
SL AMB POCT BLOOD,UA: ABNORMAL
SL AMB POCT CLARITY,UA: ABNORMAL
SL AMB POCT COLOR,UA: ABNORMAL
SL AMB POCT GLUCOSE BLD: 252
SL AMB POCT NITRITE,UA: ABNORMAL
SL AMB POCT URINE PROTEIN: 2000

## 2022-08-13 PROCEDURE — 3078F DIAST BP <80 MM HG: CPT | Performed by: PHYSICIAN ASSISTANT

## 2022-08-13 PROCEDURE — 3074F SYST BP LT 130 MM HG: CPT | Performed by: PHYSICIAN ASSISTANT

## 2022-08-13 PROCEDURE — 3061F NEG MICROALBUMINURIA REV: CPT | Performed by: PHYSICIAN ASSISTANT

## 2022-08-13 PROCEDURE — 99204 OFFICE O/P NEW MOD 45 MIN: CPT | Performed by: PHYSICIAN ASSISTANT

## 2022-08-13 PROCEDURE — 81002 URINALYSIS NONAUTO W/O SCOPE: CPT | Performed by: PHYSICIAN ASSISTANT

## 2022-08-13 PROCEDURE — 87086 URINE CULTURE/COLONY COUNT: CPT | Performed by: PHYSICIAN ASSISTANT

## 2022-08-13 PROCEDURE — 82948 REAGENT STRIP/BLOOD GLUCOSE: CPT | Performed by: PHYSICIAN ASSISTANT

## 2022-08-13 RX ORDER — CEPHALEXIN 500 MG/1
500 CAPSULE ORAL EVERY 12 HOURS SCHEDULED
Qty: 14 CAPSULE | Refills: 0 | Status: SHIPPED | OUTPATIENT
Start: 2022-08-13 | End: 2022-08-20

## 2022-08-13 NOTE — PROGRESS NOTES
[facility]        NAME: Keagan Orta is a 50 y o  female  : 1975    MRN: [id number]  DATE: 2022  TIME: 10:32 AM    Assessment and Plan   Urinary tract infection with hematuria, site unspecified [N39 0, R31 9]  1  Urinary tract infection with hematuria, site unspecified  POCT urine dip    Urine culture    cephalexin (KEFLEX) 500 mg capsule   2  Type 2 diabetes mellitus without complication, without long-term current use of insulin ([facility])  POCT blood glucose      Elevated blood sugar by fingerstick  Encouraged pcp follow up asap  Patient Instructions       Follow up with PCP in 3-5 days  Proceed to  ER if symptoms worsen  Chief Complaint     Chief Complaint   Patient presents with    Possible UTI     Pt reports of UTI s/s with increased discomfort and hematuria  History of Present Illness       Pt is a(n) 50 y o  female with pmh DM2, PCOS, depression/anxiety who presents out of concern for a UTI  Dysuria: Yes  Urgency: Yes  Frequency: Yes  Hematuria: Yes  Cloudy/malodorous urine: No  Vaginal discharge/itching: No  Concerns for STD: No  Possibility of pregnancy: No  LMP: No LMP recorded  Fever: No  Flank pain: No  Nausea/vomiting: No  Previous UTIs: Yes  Last UTI: few months ago  No otc meds/azo tried  Patient is diabetic on multiple medications but states she last checked her sugar several months ago at her pcp  Does not own a glucometer or check at home  Review of Systems   Review of Systems   Constitutional: Negative for chills, diaphoresis and fever  Respiratory: Negative for shortness of breath  Cardiovascular: Negative for chest pain  Gastrointestinal: Negative for abdominal pain, nausea and vomiting  Genitourinary: Positive for dysuria, frequency, hematuria and urgency  Negative for flank pain  Musculoskeletal: Negative for back pain and myalgias  Psychiatric/Behavioral: Negative for confusion           Current Medications       Current Outpatient Medications:     cephalexin (KEFLEX) 500 mg capsule, Take 1 capsule (500 mg total) by mouth every 12 (twelve) hours for 7 days, Disp: 14 capsule, Rfl: 0    albuterol (PROVENTIL HFA,VENTOLIN HFA) 90 mcg/act inhaler, Inhale 2 puffs every 6 (six) hours as needed for wheezing, Disp: , Rfl:     aspirin (ECOTRIN LOW STRENGTH) 81 mg EC tablet, Take 1 tablet (81 mg total) by mouth daily, Disp: 1 tablet, Rfl: 0    atorvastatin (LIPITOR) 20 mg tablet, TAKE 1 TABLET BY MOUTH  DAILY, Disp: 90 tablet, Rfl: 3    buPROPion (WELLBUTRIN XL) 300 mg 24 hr tablet, Take 300 mg by mouth every morning , Disp: , Rfl:     Cetirizine HCl (ZyrTEC Allergy) 10 MG CAPS, Take by mouth, Disp: , Rfl:     cloNIDine (CATAPRES) 0 1 mg tablet, Take 1 tablet by mouth daily , Disp: , Rfl:     desvenlafaxine (PRISTIQ) 100 mg 24 hr tablet, Take 1 tablet (100 mg total) by mouth daily Indications: Major Depressive Disorder  At [facility] (Patient taking differently: Take 50 mg by mouth daily At 9AM ), Disp: 30 tablet, Rfl: 1    Farxiga 10 MG TABS, TAKE ONE TABLET BY MOUTH EVERY DAY, Disp: 90 tablet, Rfl: 1    fluticasone (FLONASE) 50 mcg/act nasal spray, 1 spray into each nostril daily as needed for rhinitis Indications: Hayfever  , Disp: 16 g, Rfl: 0    gabapentin (NEURONTIN) 300 mg capsule, Take 3 capsules by mouth daily , Disp: , Rfl:     glipiZIDE (GLUCOTROL XL) 5 mg 24 hr tablet, Take 1 tablet (5 mg total) by mouth daily, Disp: 90 tablet, Rfl: 1    Jentadueto 2 5-1000 MG TABS, TAKE ONE TABLET BY MOUTH TWICE A DAY, Disp: 90 tablet, Rfl: 1    metoprolol succinate (TOPROL-XL) 25 mg 24 hr tablet, Take 1 tablet (25 mg total) by mouth daily At 9 AM, Disp: 90 tablet, Rfl: 1    Multiple Vitamin (MULTIVITAMINS PO), Take 1 tablet by mouth daily , Disp: , Rfl:     ramipril (ALTACE) 1 25 mg capsule, TAKE 1 CAPSULE BY MOUTH  DAILY, Disp: 90 capsule, Rfl: 3    Sharobel 0 35 MG tablet, TAKE ONE TABLET BY MOUTH EVERY DAY, Disp: 28 tablet, Rfl: 11   traZODone (DESYREL) 100 mg tablet, Take 100 mg by mouth daily, Disp: , Rfl:     Vraylar 6 MG capsule, every other day  , Disp: , Rfl:     Current Allergies     Allergies as of 2022 - Reviewed 2022   Allergen Reaction Noted    Oxycodone-acetaminophen Nausea Only and Vomiting 2006            The following portions of the patient's history were reviewed and updated as appropriate: allergies, current medications, past family history, past medical history, past social history, past surgical history and problem list      Past Medical History:   Diagnosis Date    Acid reflux     Anxiety     Asthma     Blood in stool     Cervicalgia     Constipation     Depression     Diabetes ([facility] [address] [facility]     Disorder of sebaceous glands 2007    Hypertension     Infertility, female     Lyme disease 2011    Migraine     Nonallopathic lesion 12/15/2006    Palpitations 2007    Polycystic ovary syndrome     Psychiatric disorder     Stress incontinence        Past Surgical History:   Procedure Laterality Date     SECTION      CHOLECYSTECTOMY      NASAL SEPTUM SURGERY         Family History   Problem Relation Age of Onset    Atrial fibrillation Mother     Diabetes Mother     Heart attack Mother     Hypertension Mother    [facility] Breast cancer Mother 66    Bipolar disorder Sister         bipolar 1    Endometriosis Sister     Irritable bowel syndrome Sister     Atrial fibrillation Brother    [facility] ADD / ADHD Son     Atrial fibrillation Other     Other Family         asbestosis; mixed hyperlipoproteinemia    Diabetes Family     Emphysema Family     Glaucoma Family     Heart disease Family     Hypertension Family     Hypertension Father     Emphysema Father     No Known Problems Daughter     No Known Problems Daughter     No Known Problems Maternal Aunt     No Known Problems Maternal Aunt     Cancer Paternal Aunt         lung CA    No Known Problems Paternal Aunt     No Known Problems Paternal Aunt     Stroke Neg Hx          Medications have been verified  Objective   /72   Pulse 97   Temp 98 1 °F (36 7 °C)   Resp 18   Ht 4' 11 5" (1 511 m)   Wt 56 kg (123 lb 6 4 oz)   SpO2 99%   BMI 24 51 kg/m²        Physical Exam     Physical Exam  Vitals and nursing note reviewed  Constitutional:       General: She is not in acute distress  Appearance: Normal appearance  She is not ill-appearing  HENT:      Head: Normocephalic and atraumatic  Cardiovascular:      Rate and Rhythm: Normal rate and regular rhythm  Heart sounds: Normal heart sounds  Pulmonary:      Effort: Pulmonary effort is normal  No respiratory distress  Breath sounds: Normal breath sounds  No wheezing, rhonchi or rales  Abdominal:      General: Abdomen is flat  Palpations: Abdomen is soft  Tenderness: There is no abdominal tenderness  There is no right CVA tenderness, left CVA tenderness or guarding  Skin:     General: Skin is warm and dry  Capillary Refill: Capillary refill takes less than 2 seconds  Neurological:      Mental Status: She is alert and oriented to person, place, and time     Psychiatric:         Behavior: Behavior normal

## 2022-08-15 LAB — BACTERIA UR CULT: NORMAL

## 2022-08-16 DIAGNOSIS — E11.9 TYPE 2 DIABETES MELLITUS WITHOUT COMPLICATION, WITHOUT LONG-TERM CURRENT USE OF INSULIN (HCC): ICD-10-CM

## 2022-08-16 DIAGNOSIS — I10 BENIGN ESSENTIAL HYPERTENSION: ICD-10-CM

## 2022-08-17 PROCEDURE — 4010F ACE/ARB THERAPY RXD/TAKEN: CPT | Performed by: PHYSICIAN ASSISTANT

## 2022-08-17 RX ORDER — RAMIPRIL 1.25 MG/1
CAPSULE ORAL
Qty: 90 CAPSULE | Refills: 3 | Status: SHIPPED | OUTPATIENT
Start: 2022-08-17

## 2022-10-06 DIAGNOSIS — E11.9 TYPE 2 DIABETES MELLITUS WITHOUT COMPLICATION, WITHOUT LONG-TERM CURRENT USE OF INSULIN (HCC): ICD-10-CM

## 2022-10-06 RX ORDER — LINAGLIPTIN AND METFORMIN HYDROCHLORIDE 2.5; 1 MG/1; MG/1
TABLET, FILM COATED ORAL
Qty: 90 TABLET | Refills: 1 | Status: SHIPPED | OUTPATIENT
Start: 2022-10-06

## 2022-11-03 DIAGNOSIS — N92.6 IRREGULAR MENSES: ICD-10-CM

## 2022-11-03 RX ORDER — ACETAMINOPHEN AND CODEINE PHOSPHATE 120; 12 MG/5ML; MG/5ML
SOLUTION ORAL
Qty: 28 TABLET | Refills: 11 | Status: SHIPPED | OUTPATIENT
Start: 2022-11-03

## 2022-11-28 ENCOUNTER — VBI (OUTPATIENT)
Dept: ADMINISTRATIVE | Facility: OTHER | Age: 47
End: 2022-11-28